# Patient Record
Sex: FEMALE | Race: WHITE | NOT HISPANIC OR LATINO | Employment: PART TIME | URBAN - METROPOLITAN AREA
[De-identification: names, ages, dates, MRNs, and addresses within clinical notes are randomized per-mention and may not be internally consistent; named-entity substitution may affect disease eponyms.]

---

## 2017-03-29 ENCOUNTER — OFFICE VISIT (OUTPATIENT)
Dept: INTERNAL MEDICINE | Age: 67
End: 2017-03-29

## 2017-03-29 VITALS
OXYGEN SATURATION: 96 % | HEART RATE: 78 BPM | SYSTOLIC BLOOD PRESSURE: 140 MMHG | WEIGHT: 140.2 LBS | TEMPERATURE: 97.7 F | HEIGHT: 62 IN | BODY MASS INDEX: 25.8 KG/M2 | DIASTOLIC BLOOD PRESSURE: 80 MMHG

## 2017-03-29 DIAGNOSIS — J30.9 ALLERGIC RHINITIS, UNSPECIFIED ALLERGIC RHINITIS TRIGGER, UNSPECIFIED RHINITIS SEASONALITY: Primary | ICD-10-CM

## 2017-03-29 PROCEDURE — 99213 OFFICE O/P EST LOW 20 MIN: CPT | Performed by: INTERNAL MEDICINE

## 2017-03-29 RX ORDER — FLUTICASONE PROPIONATE 50 MCG
2 SPRAY, SUSPENSION (ML) NASAL DAILY
Qty: 1 EACH | Refills: 0 | Status: SHIPPED | OUTPATIENT
Start: 2017-03-29 | End: 2017-07-24 | Stop reason: SDUPTHER

## 2017-03-29 NOTE — PROGRESS NOTES
Subjective   Sandi Acevedo is a 66 y.o. female.     History of Present Illness  head and chest congestion for the past several weeks.  She does note that similar illness seems to be passing around through coworkers.  He denied facial or dental pain.  Sinus drainage is clear.  Cough is productive of clear mucus as well, he denied hemoptysis.  She denied any chest pain, shortness breath, heartburn but does have some wheezing.  He is unsure if she has cough each spring.  Home treatment none.    The following portions of the patient's history were reviewed and updated as appropriate: allergies, current medications, past medical history, past social history and problem list.    Review of Systems   Constitutional: Positive for appetite change.   Gastrointestinal: Negative for nausea and vomiting.   Skin: Negative for rash.   Neurological: Negative for headaches.   Hematological: Negative for adenopathy.       Objective   Physical Exam   Constitutional: She is oriented to person, place, and time. She appears well-developed. No distress.   o/w   HENT:   Nose: Right sinus exhibits no maxillary sinus tenderness and no frontal sinus tenderness. Left sinus exhibits no maxillary sinus tenderness and no frontal sinus tenderness.   Neck: Neck supple.   Pulmonary/Chest: Effort normal and breath sounds normal. No respiratory distress. She has no wheezes.   No egophony noted   Lymphadenopathy:     She has no cervical adenopathy.   Neurological: She is alert and oriented to person, place, and time.   Skin: Skin is warm and dry.   Psychiatric: She has a normal mood and affect. Her behavior is normal. Thought content normal.   Nursing note and vitals reviewed.      Assessment/Plan   Sandi was seen today for uri.    Diagnoses and all orders for this visit:    Allergic rhinitis, unspecified allergic rhinitis trigger, unspecified rhinitis seasonality  -     fluticasone (FLONASE) 50 MCG/ACT nasal spray; 2 sprays into each nostril Daily for 30  days.      Number-one suspect that this is an allergic etiology, and will treat with Flonase as above patient was also asked to obtain Allegra once daily.  She'll let me know in 2 weeks if the symptoms have not improved her satisfaction. N o Antibiotics will be prescribed at this point.

## 2017-04-13 ENCOUNTER — TELEPHONE (OUTPATIENT)
Dept: INTERNAL MEDICINE | Age: 67
End: 2017-04-13

## 2017-04-13 DIAGNOSIS — J45.909 UNCOMPLICATED ASTHMA, UNSPECIFIED ASTHMA SEVERITY: Primary | ICD-10-CM

## 2017-04-18 ENCOUNTER — HOSPITAL ENCOUNTER (OUTPATIENT)
Dept: RESPIRATORY THERAPY | Facility: HOSPITAL | Age: 67
Discharge: HOME | End: 2017-04-18
Admitting: INTERNAL MEDICINE

## 2017-04-18 LAB
BDY SITE: NORMAL
HGB BLDA-MCNC: 14 G/DL

## 2017-04-18 PROCEDURE — 94729 DIFFUSING CAPACITY: CPT

## 2017-04-18 PROCEDURE — 82820 HEMOGLOBIN-OXYGEN AFFINITY: CPT | Performed by: INTERNAL MEDICINE

## 2017-04-18 PROCEDURE — 94060 EVALUATION OF WHEEZING: CPT

## 2017-04-18 PROCEDURE — 94726 PLETHYSMOGRAPHY LUNG VOLUMES: CPT

## 2017-04-18 PROCEDURE — A9270 NON-COVERED ITEM OR SERVICE: HCPCS | Performed by: INTERNAL MEDICINE

## 2017-04-18 PROCEDURE — 63710000001 ALBUTEROL PER 1 MG: Performed by: INTERNAL MEDICINE

## 2017-04-18 RX ORDER — ALBUTEROL SULFATE 2.5 MG/3ML
2.5 SOLUTION RESPIRATORY (INHALATION) ONCE AS NEEDED
Status: COMPLETED | OUTPATIENT
Start: 2017-04-18 | End: 2017-04-18

## 2017-04-18 RX ADMIN — ALBUTEROL SULFATE 2.5 MG: 2.5 SOLUTION RESPIRATORY (INHALATION) at 15:19

## 2017-04-28 ENCOUNTER — OFFICE VISIT (OUTPATIENT)
Dept: INTERNAL MEDICINE | Age: 67
End: 2017-04-28

## 2017-04-28 VITALS
DIASTOLIC BLOOD PRESSURE: 80 MMHG | SYSTOLIC BLOOD PRESSURE: 150 MMHG | WEIGHT: 138 LBS | TEMPERATURE: 97.6 F | OXYGEN SATURATION: 98 % | HEART RATE: 75 BPM | HEIGHT: 62 IN | BODY MASS INDEX: 25.4 KG/M2

## 2017-04-28 DIAGNOSIS — T46.4X5A COUGH DUE TO ACE INHIBITOR: ICD-10-CM

## 2017-04-28 DIAGNOSIS — R05.8 COUGH DUE TO ACE INHIBITOR: ICD-10-CM

## 2017-04-28 DIAGNOSIS — E78.00 PURE HYPERCHOLESTEROLEMIA: ICD-10-CM

## 2017-04-28 DIAGNOSIS — I10 HYPERTENSION, ESSENTIAL: Primary | ICD-10-CM

## 2017-04-28 DIAGNOSIS — J43.9 PULMONARY EMPHYSEMA, UNSPECIFIED EMPHYSEMA TYPE (HCC): ICD-10-CM

## 2017-04-28 LAB
ALBUMIN SERPL-MCNC: 4.4 G/DL (ref 3.5–5.2)
ALBUMIN/GLOB SERPL: 1.2 G/DL
ALP SERPL-CCNC: 82 U/L (ref 39–117)
ALT SERPL-CCNC: 23 U/L (ref 1–33)
AST SERPL-CCNC: 23 U/L (ref 1–32)
BILIRUB SERPL-MCNC: 0.6 MG/DL (ref 0.1–1.2)
BUN SERPL-MCNC: 15 MG/DL (ref 8–23)
BUN/CREAT SERPL: 17 (ref 7–25)
CALCIUM SERPL-MCNC: 9.8 MG/DL (ref 8.6–10.5)
CHLORIDE SERPL-SCNC: 101 MMOL/L (ref 98–107)
CHOLEST SERPL-MCNC: 231 MG/DL (ref 0–200)
CO2 SERPL-SCNC: 25.8 MMOL/L (ref 22–29)
CREAT SERPL-MCNC: 0.88 MG/DL (ref 0.57–1)
GLOBULIN SER CALC-MCNC: 3.6 GM/DL
GLUCOSE SERPL-MCNC: 103 MG/DL (ref 65–99)
HDLC SERPL-MCNC: 67 MG/DL (ref 40–60)
LDLC SERPL CALC-MCNC: 143 MG/DL (ref 0–100)
POTASSIUM SERPL-SCNC: 4.4 MMOL/L (ref 3.5–5.2)
PROT SERPL-MCNC: 8 G/DL (ref 6–8.5)
SODIUM SERPL-SCNC: 140 MMOL/L (ref 136–145)
TRIGL SERPL-MCNC: 104 MG/DL (ref 0–150)
TSH SERPL DL<=0.005 MIU/L-ACNC: 2.63 MIU/ML (ref 0.27–4.2)
VLDLC SERPL CALC-MCNC: 20.8 MG/DL (ref 5–40)

## 2017-04-28 PROCEDURE — 99214 OFFICE O/P EST MOD 30 MIN: CPT | Performed by: INTERNAL MEDICINE

## 2017-04-28 RX ORDER — AMLODIPINE BESYLATE 5 MG/1
7.5 TABLET ORAL DAILY
Qty: 45 TABLET | Refills: 2 | Status: SHIPPED | OUTPATIENT
Start: 2017-04-28 | End: 2017-10-25 | Stop reason: SDUPTHER

## 2017-04-28 NOTE — PROGRESS NOTES
"Sandi V Kristina / 66 y.o. / female  04/28/2017    VITALS    /80  Pulse 75  Temp 97.6 °F (36.4 °C)  Ht 62\" (157.5 cm)  Wt 138 lb (62.6 kg)  LMP  (Approximate)  SpO2 98%  BMI 25.24 kg/m2  BP Readings from Last 3 Encounters:   04/28/17 150/80   03/29/17 140/80   10/28/16 140/70     Wt Readings from Last 3 Encounters:   04/28/17 138 lb (62.6 kg)   03/29/17 140 lb 3.2 oz (63.6 kg)   10/28/16 140 lb 1.6 oz (63.5 kg)      Body mass index is 25.24 kg/(m^2).    CC:  Main reason(s) for today's visit: Hypertension      HPI:     Patient presents today for follow-up of hypertension.  She was last seen by me in October last year which time her blood pressure well controlled 140/70.    Hypertension: Today she is compliant with medication, after salt restriction.  Exercises regularly, occasionally will monitor blood pressure the outpatient setting.  When monitored, blood pressures noted to be within normal range.    Patient complains of dry cough over the past 4 months.  He denied fever, chills, sweats, wheezing, heartburn and she does have drainage.  The Flonase and Allegra have been helpful with  controlling drainage but she still does note some issues with a dry cough.  She has been on Lotrel for the past 18 years.    Interval history: PFTs were done in the hospital last week, these are consistent with obstructive defect.  She is a nonsmoker, her daughter did smoke at one time.    ______________________________________________________    ASSESSMENT & PLAN:    1. Hypertension, essential    2. Cough due to ACE inhibitor    3. Pulmonary emphysema, unspecified emphysema type    4. Pure hypercholesterolemia      Orders Placed This Encounter   Procedures   • Comprehensive Metabolic Panel   • Lipid Panel   • TSH       Summary/Discussion:     · #1 hypertension needs improved control.  Changed from Lotrel to amlodipine at 7.5 mg per day, blood pressure check 6 weeks.  ·   · #2 cough this may be due to ACE inhibitor, or a " manifestation of reversible airway disease.  Plan: I initially will eliminate the benazepril from her regimen and have her come back in 6 weeks to recheck blood pressure.  If cough is persistent at that time would consider addition of a beta-2 agonist.  ·   · #3 mild obstructive defect on pulmonary function tests done last month with significant reversible component.  This may indicate presence of mild degree of COPD or asthma.  ·   · #4 hyperlipidemia status post diet, we'll check lipids today.  (Patient is fasting.)      Return in about 6 weeks (around 6/9/2017) for Blood pressure check.    Future Appointments  Date Time Provider Department Center   7/5/2017 2:15 PM Gary Coughlin MD [facility] None       ______________________________________________________    REVIEW OF SYSTEMS    Review of Systems   Respiratory: Negative for chest tightness and shortness of breath.    Cardiovascular: Negative for chest pain and palpitations.   Neurological: Negative for dizziness, syncope, speech difficulty, weakness, light-headedness and headaches.         PHYSICAL EXAMINATION    Physical Exam   Constitutional: She is oriented to person, place, and time. She appears well-developed and well-nourished. No distress.   Cardiovascular: Normal rate, regular rhythm, normal heart sounds and intact distal pulses.  Exam reveals no gallop and no friction rub.    No murmur heard.  Pulmonary/Chest: Effort normal and breath sounds normal. She has no wheezes. She has no rales.   Musculoskeletal: She exhibits no edema.   Neurological: She is alert and oriented to person, place, and time.   Skin: Skin is warm and dry. No rash noted.   Psychiatric: She has a normal mood and affect. Her behavior is normal. Judgment and thought content normal.   Nursing note and vitals reviewed.    l      REVIEWED DATA:    Labs:   Lab Results   Component Value Date     11/02/2016    K 4.3 11/02/2016    AST 24 11/02/2016    ALT 16 11/02/2016    BUN  13 11/02/2016    CREATININE 0.79 11/02/2016    CREATININE 0.75 05/23/2016    EGFRIFNONA 77 11/02/2016    EGFRIFAFRI 89 11/02/2016       Lab Results   Component Value Date    GLU 92 11/02/2016    GLU 92 05/23/2016       Lab Results   Component Value Date     (H) 11/02/2016     (H) 05/23/2016    HDL 62 11/02/2016    TRIG 99 11/02/2016       Lab Results   Component Value Date    TSH 2.520 05/23/2016          Lab Results   Component Value Date    WBC 8.7 05/23/2016    HGB 14.5 05/23/2016     05/23/2016        Imaging:        Medical Tests:        Summary of old records / correspondence / consultant report:        Request outside records:          ALLERGIES:    Review of patient's allergies indicates no known allergies.    MEDICATIONS:       Current Outpatient Prescriptions   Medication Sig Dispense Refill   • amLODIPine (NORVASC) 5 MG tablet Take 1.5 tablets by mouth Daily. 45 tablet 2   • aspirin 81 MG tablet Take 81 mg by mouth daily.     • calcium carbonate (OS-ROCIO) 600 MG tablet Take 600 mg by mouth daily.     • cholecalciferol (VITAMIN D3) 1000 UNITS tablet Take 1,000 Units by mouth daily.     • clobetasol (TEMOVATE) 0.05 % cream Apply 1 application topically 1 (One) Time Per Week.     • Flaxseed, Linseed, (FLAXSEED OIL PO) Take  by mouth.     • fluticasone (FLONASE) 50 MCG/ACT nasal spray 2 sprays into each nostril Daily for 30 days. 1 each 0   • Multiple Vitamins-Minerals ([employer] SPECTRAVITE ADULT 50+ PO) Take  by mouth.     • Omega-3 Fatty Acids (FISH OIL) 1000 MG capsule capsule Take  by mouth daily with breakfast.     • Probiotic Product (PROBIOTIC DAILY PO) Take  by mouth.       No current facility-administered medications for this visit.        [facility]    The following portions of the patient's history were reviewed and updated as appropriate: Allergies / Current Medications / Past Medical History / Surgical History / Social History / Family History    PROBLEM LIST:    Patient Active Problem  List   Diagnosis   • Hypertension, essential   • Dupuytren's contracture of hand (Keo Bauer)   • Glaucoma associated with ocular disorder (Conrd)   • Low back pain   • Carotid stenosis mild ( 2/2012)   • Blepharitis of both eyes   • Lichen sclerosus (GYN = isabell Garza)   • Agoraphobia   • Urinary incontinence in female   • Squamous cell carcinoma   • Osteopenia   • FH: uterine cancer   • FH: CAD (coronary artery disease)   • Vitamin D deficiency disease   • Postmenopausal   • Panic anxiety syndrome (Xanax)   • Hayfever   • Osteoarthritis   • Routine health maintenance   • Cough due to ACE inhibitor   • Pulmonary emphysema   • Pure hypercholesterolemia       PAST MEDICAL HX:    Past Medical History:   Diagnosis Date   • Anxiety    • Arthritis    • Hypertension    • Low back pain    • Osteopenia    • Urinary tract infection    • Visual impairment        PAST SURGICAL HX:    Past Surgical History:   Procedure Laterality Date   • CYST REMOVAL Right 12/23/2015    Neck- Dorota Bauer   • EYE SURGERY Left 01/01/1955    to repair lazy eye   • LAPAROSCOPIC TUBAL LIGATION Bilateral 1986   • TUBAL ABDOMINAL LIGATION         SOCIAL HX:    Social History     Social History   • Marital status:      Spouse name: none   • Number of children: 2   • Years of education: 12      Occupational History   • 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[employer]     Social History Main Topics   • Smoking status: Never Smoker   • Smokeless tobacco: Never Used   • Alcohol use No   • Drug use: No   • Sexual activity: No     Other Topics Concern   • None     Social History Narrative    Hobby water aerobics, yard work       FAMILY HX:    Family History   Problem Relation Age of Onset   • Arthritis Mother    • Cancer Mother      espoh, uterine   • Glaucoma Mother    • Hypertension Mother    • Stroke Mother    • Osteoporosis Mother    • Ovarian cancer Mother    • Heart disease Father    • Stroke Father    • Thyroid disease Sister     • Hyperlipidemia Brother    • Hypertension Brother    • Asthma Daughter    • Heart disease Maternal Aunt          **Jose Disclaimer:   Much of this encounter note is an electronic transcription/translation of spoken language to printed text. The electronic translation of spoken language may permit erroneous, or at times, nonsensical words or phrases to be inadvertently transcribed. Although I have reviewed the note for such errors, some may still exist.

## 2017-05-10 ENCOUNTER — TELEPHONE (OUTPATIENT)
Dept: INTERNAL MEDICINE | Age: 67
End: 2017-05-10

## 2017-05-12 ENCOUNTER — TELEPHONE (OUTPATIENT)
Dept: INTERNAL MEDICINE | Age: 67
End: 2017-05-12

## 2017-05-12 ENCOUNTER — PATIENT MESSAGE (OUTPATIENT)
Dept: INTERNAL MEDICINE | Age: 67
End: 2017-05-12

## 2017-05-24 ENCOUNTER — OFFICE VISIT (OUTPATIENT)
Dept: CARDIOLOGY | Facility: CLINIC | Age: 67
End: 2017-05-24

## 2017-05-24 VITALS
SYSTOLIC BLOOD PRESSURE: 143 MMHG | HEIGHT: 62 IN | HEART RATE: 79 BPM | BODY MASS INDEX: 25.58 KG/M2 | DIASTOLIC BLOOD PRESSURE: 81 MMHG | WEIGHT: 139 LBS

## 2017-05-24 DIAGNOSIS — R00.2 PALPITATIONS: ICD-10-CM

## 2017-05-24 DIAGNOSIS — I65.29 STENOSIS OF CAROTID ARTERY, UNSPECIFIED LATERALITY: ICD-10-CM

## 2017-05-24 DIAGNOSIS — I10 HYPERTENSION, ESSENTIAL: Primary | ICD-10-CM

## 2017-05-24 DIAGNOSIS — E78.5 HYPERLIPIDEMIA, UNSPECIFIED HYPERLIPIDEMIA TYPE: ICD-10-CM

## 2017-05-24 PROCEDURE — 93000 ELECTROCARDIOGRAM COMPLETE: CPT | Performed by: INTERNAL MEDICINE

## 2017-05-24 PROCEDURE — 99213 OFFICE O/P EST LOW 20 MIN: CPT | Performed by: INTERNAL MEDICINE

## 2017-06-08 ENCOUNTER — OFFICE VISIT (OUTPATIENT)
Dept: INTERNAL MEDICINE | Age: 67
End: 2017-06-08

## 2017-06-08 VITALS
BODY MASS INDEX: 25.95 KG/M2 | OXYGEN SATURATION: 98 % | DIASTOLIC BLOOD PRESSURE: 70 MMHG | WEIGHT: 141 LBS | HEART RATE: 82 BPM | TEMPERATURE: 97.9 F | HEIGHT: 62 IN | SYSTOLIC BLOOD PRESSURE: 140 MMHG

## 2017-06-08 DIAGNOSIS — I10 HYPERTENSION, ESSENTIAL: Primary | ICD-10-CM

## 2017-06-08 DIAGNOSIS — R05.8 COUGH DUE TO ACE INHIBITOR: ICD-10-CM

## 2017-06-08 DIAGNOSIS — T46.4X5A COUGH DUE TO ACE INHIBITOR: ICD-10-CM

## 2017-06-08 PROCEDURE — 99213 OFFICE O/P EST LOW 20 MIN: CPT | Performed by: INTERNAL MEDICINE

## 2017-06-08 NOTE — PROGRESS NOTES
Subjective   Sandi Acevedo is a 66 y.o. female.     History of Present Illness patient presents today for follow-up of last office visit approximately 6 weeks ago.  That time, because of cough which I presume was secondary to ACE inhibitor, Lotrel was changed amlodipine alone.  Patient was asked to return today for follow-up of cough and hypertension control.  She notes that the cough is totally resolved at this time.    Hypertension: She is compliant with medication and Dr. salt restriction, regular exercise, and occasional outpatient blood pressure monitoring.    The following portions of the patient's history were reviewed and updated as appropriate: allergies, current medications, past medical history and problem list.    Review of Systems   Respiratory: Negative for chest tightness and shortness of breath.    Cardiovascular: Negative for chest pain and palpitations.   Neurological: Negative for dizziness, syncope, speech difficulty, weakness, light-headedness and headaches.       Objective   Physical Exam   Constitutional: She is oriented to person, place, and time. She appears well-developed. No distress.   o/w   Cardiovascular: Normal rate, regular rhythm, normal heart sounds and intact distal pulses.  Exam reveals no gallop and no friction rub.    No murmur heard.  Pulmonary/Chest: Effort normal and breath sounds normal. She has no wheezes. She has no rales.   Musculoskeletal: She exhibits no edema.   Neurological: She is alert and oriented to person, place, and time.   Skin: Skin is warm and dry. No rash noted.   Psychiatric: She has a normal mood and affect. Her behavior is normal. Judgment and thought content normal.   Nursing note and vitals reviewed.      Assessment/Plan   Sandi was seen today for hypertension.    Diagnoses and all orders for this visit:    Hypertension, essential    Cough due to ACE inhibitor    #1 hypertension stable on current medical regimen.  Plan: Same meds, blood pressure check at  physical in October.  Refill for amlodipine 2 separate tablets 5 mg and 2.5 mg provided today.    #2 cough resolved, shortness and annotated with reaction to benazepril.

## 2017-07-07 ENCOUNTER — TRANSCRIBE ORDERS (OUTPATIENT)
Dept: ADMINISTRATIVE | Facility: HOSPITAL | Age: 67
End: 2017-07-07

## 2017-07-07 DIAGNOSIS — Z12.31 VISIT FOR SCREENING MAMMOGRAM: Primary | ICD-10-CM

## 2017-07-07 DIAGNOSIS — Z78.0 POSTMENOPAUSAL STATUS: ICD-10-CM

## 2017-07-24 DIAGNOSIS — J30.9 ALLERGIC RHINITIS, UNSPECIFIED ALLERGIC RHINITIS TRIGGER, UNSPECIFIED RHINITIS SEASONALITY: ICD-10-CM

## 2017-07-24 RX ORDER — FLUTICASONE PROPIONATE 50 MCG
SPRAY, SUSPENSION (ML) NASAL
Qty: 16 ML | Refills: 0 | Status: SHIPPED | OUTPATIENT
Start: 2017-07-24 | End: 2017-10-18 | Stop reason: SDUPTHER

## 2017-08-07 ENCOUNTER — APPOINTMENT (OUTPATIENT)
Dept: BONE DENSITY | Facility: HOSPITAL | Age: 67
End: 2017-08-07
Attending: SPECIALIST

## 2017-08-07 ENCOUNTER — APPOINTMENT (OUTPATIENT)
Dept: MAMMOGRAPHY | Facility: HOSPITAL | Age: 67
End: 2017-08-07
Attending: SPECIALIST

## 2017-08-08 ENCOUNTER — HOSPITAL ENCOUNTER (OUTPATIENT)
Dept: BONE DENSITY | Facility: HOSPITAL | Age: 67
Discharge: HOME | End: 2017-08-08
Attending: SPECIALIST | Admitting: SPECIALIST

## 2017-08-08 ENCOUNTER — HOSPITAL ENCOUNTER (OUTPATIENT)
Dept: MAMMOGRAPHY | Facility: HOSPITAL | Age: 67
Discharge: HOME | End: 2017-08-08
Attending: SPECIALIST

## 2017-08-08 DIAGNOSIS — Z12.31 VISIT FOR SCREENING MAMMOGRAM: ICD-10-CM

## 2017-08-08 DIAGNOSIS — Z78.0 POSTMENOPAUSAL STATUS: ICD-10-CM

## 2017-08-08 PROCEDURE — 77080 DXA BONE DENSITY AXIAL: CPT

## 2017-08-08 PROCEDURE — G0202 SCR MAMMO BI INCL CAD: HCPCS

## 2017-09-03 ENCOUNTER — PATIENT MESSAGE (OUTPATIENT)
Dept: INTERNAL MEDICINE | Age: 67
End: 2017-09-03

## 2017-09-05 RX ORDER — TRIAMTERENE AND HYDROCHLOROTHIAZIDE 37.5; 25 MG/1; MG/1
1 TABLET ORAL DAILY
Qty: 30 TABLET | Refills: 1 | Status: SHIPPED | OUTPATIENT
Start: 2017-09-05 | End: 2017-10-02

## 2017-09-08 ENCOUNTER — TELEPHONE (OUTPATIENT)
Dept: INTERNAL MEDICINE | Age: 67
End: 2017-09-08

## 2017-09-08 NOTE — TELEPHONE ENCOUNTER
----- Message from Citlaly HoyosVIOLETA sent at 9/8/2017  9:05 AM EDT -----  Regarding: FW: Non-Urgent Medical Question  Contact: [phone]      ----- Message -----     From: Sandi Acevedo     Sent: 9/8/2017   9:04 AM       To: Aleksandar Cadena [facility]  Subject: RE: Non-Urgent Medical Question                  Hi  Will this pill interact with my vitamins?  Thanks  ----- Message -----  From: VIOLETA Citlaly EWING  Sent: 9/5/2017 12:27 PM EDT  To: Sandi Acevedo  Subject: RE: Non-Urgent Medical Question    Yes. It was sent to [facility] on [address].   Citlaly ManuelgomeryVIOLETA    ----- Message -----     From: Sandi Acevedo     Sent: 9/5/2017 12:16 PM EDT       To: Uri Joaquin MD  Subject: RE: Non-Urgent Medical Question    Ok did you send it to [facility] on [address] [facility] [facility]?  ----- Message -----  From: Uri Joaquin MD  Sent: 9/5/2017 10:36 AM EDT  To: Sandi Acevedo  Subject: RE: Non-Urgent Medical Question    I would prefer to use the Maxide since it does not lower the potassium.  Hydrochlorothiazide alone will cause low potassium and can cause heart  rhythm disturbances.  Dr. Joaquin    ----- Message -----     From: Sandi Acevedo     Sent: 9/5/2017  9:34 AM EDT       To: Uri Joaquin MD  Subject: RE: Non-Urgent Medical Question    Hello  I read about maxide. Is it possible for me to take a low dose of just hydrochlorothiazide?  Thanks  ----- Message -----  From: Uri Joaquin MD  Sent: 9/5/2017  7:34 AM EDT  To: Sandi Acevedo  Subject: RE: Non-Urgent Medical Question    I believe the increased edema is due to the amlodipine.  We can try low-dose diuretic (Maxide 25).  Please let me know if this is not helpful over the next week or so.  Dr. Joaquin    ----- Message -----     From: Sandi Acevedo     Sent: 9/3/2017  9:33 AM EDT       To: Uri Joaquin MD  Subject: Non-Urgent Medical Question    Hello  I have gained around 5 pounds since I no longer kaylee benazapril. I believe I am retaining water. Is it possible for  me to take a low dose of water pill?  Many thanks and have a good day!

## 2017-10-18 DIAGNOSIS — J30.9 ALLERGIC RHINITIS: ICD-10-CM

## 2017-10-18 RX ORDER — FLUTICASONE PROPIONATE 50 MCG
SPRAY, SUSPENSION (ML) NASAL
Qty: 16 ML | Refills: 0 | Status: SHIPPED | OUTPATIENT
Start: 2017-10-18 | End: 2017-11-01 | Stop reason: SDUPTHER

## 2017-10-25 ENCOUNTER — OFFICE VISIT (OUTPATIENT)
Dept: INTERNAL MEDICINE | Age: 67
End: 2017-10-25

## 2017-10-25 VITALS
HEART RATE: 72 BPM | OXYGEN SATURATION: 98 % | WEIGHT: 140.8 LBS | BODY MASS INDEX: 25.91 KG/M2 | DIASTOLIC BLOOD PRESSURE: 70 MMHG | HEIGHT: 62 IN | TEMPERATURE: 97.8 F | SYSTOLIC BLOOD PRESSURE: 136 MMHG

## 2017-10-25 DIAGNOSIS — J43.9 PULMONARY EMPHYSEMA, UNSPECIFIED EMPHYSEMA TYPE (HCC): ICD-10-CM

## 2017-10-25 DIAGNOSIS — I10 HYPERTENSION, ESSENTIAL: Primary | ICD-10-CM

## 2017-10-25 DIAGNOSIS — E78.00 PURE HYPERCHOLESTEROLEMIA: ICD-10-CM

## 2017-10-25 DIAGNOSIS — Z23 ENCOUNTER FOR IMMUNIZATION: ICD-10-CM

## 2017-10-25 PROBLEM — J44.9 COPD (CHRONIC OBSTRUCTIVE PULMONARY DISEASE): Status: ACTIVE | Noted: 2017-04-28

## 2017-10-25 LAB
ALBUMIN SERPL-MCNC: 4.6 G/DL (ref 3.5–5.2)
ALBUMIN/GLOB SERPL: 1.6 G/DL
ALP SERPL-CCNC: 104 U/L (ref 39–117)
ALT SERPL-CCNC: 24 U/L (ref 1–33)
AST SERPL-CCNC: 26 U/L (ref 1–32)
BASOPHILS # BLD AUTO: 0.05 10*3/MM3 (ref 0–0.2)
BASOPHILS NFR BLD AUTO: 0.8 % (ref 0–1.5)
BILIRUB SERPL-MCNC: 0.6 MG/DL (ref 0.1–1.2)
BUN SERPL-MCNC: 12 MG/DL (ref 8–23)
BUN/CREAT SERPL: 13.2 (ref 7–25)
CALCIUM SERPL-MCNC: 10 MG/DL (ref 8.6–10.5)
CHLORIDE SERPL-SCNC: 101 MMOL/L (ref 98–107)
CHOLEST SERPL-MCNC: 232 MG/DL (ref 0–200)
CO2 SERPL-SCNC: 30.1 MMOL/L (ref 22–29)
CREAT SERPL-MCNC: 0.91 MG/DL (ref 0.57–1)
EOSINOPHIL # BLD AUTO: 0.14 10*3/MM3 (ref 0–0.7)
EOSINOPHIL NFR BLD AUTO: 2.3 % (ref 0.3–6.2)
ERYTHROCYTE [DISTWIDTH] IN BLOOD BY AUTOMATED COUNT: 13.5 % (ref 11.7–13)
GFR SERPLBLD CREATININE-BSD FMLA CKD-EPI: 61 ML/MIN/1.73
GFR SERPLBLD CREATININE-BSD FMLA CKD-EPI: 74 ML/MIN/1.73
GLOBULIN SER CALC-MCNC: 2.8 GM/DL
GLUCOSE SERPL-MCNC: 100 MG/DL (ref 65–99)
HCT VFR BLD AUTO: 46 % (ref 35.6–45.5)
HDLC SERPL-MCNC: 59 MG/DL (ref 40–60)
HGB BLD-MCNC: 14.4 G/DL (ref 11.9–15.5)
IMM GRANULOCYTES # BLD: 0 10*3/MM3 (ref 0–0.03)
IMM GRANULOCYTES NFR BLD: 0 % (ref 0–0.5)
LDLC SERPL CALC-MCNC: 147 MG/DL (ref 0–100)
LYMPHOCYTES # BLD AUTO: 1.58 10*3/MM3 (ref 0.9–4.8)
LYMPHOCYTES NFR BLD AUTO: 25.7 % (ref 19.6–45.3)
MCH RBC QN AUTO: 29.9 PG (ref 26.9–32)
MCHC RBC AUTO-ENTMCNC: 31.3 G/DL (ref 32.4–36.3)
MCV RBC AUTO: 95.6 FL (ref 80.5–98.2)
MONOCYTES # BLD AUTO: 0.6 10*3/MM3 (ref 0.2–1.2)
MONOCYTES NFR BLD AUTO: 9.8 % (ref 5–12)
NEUTROPHILS # BLD AUTO: 3.77 10*3/MM3 (ref 1.9–8.1)
NEUTROPHILS NFR BLD AUTO: 61.4 % (ref 42.7–76)
PLATELET # BLD AUTO: 329 10*3/MM3 (ref 140–500)
POTASSIUM SERPL-SCNC: 4.4 MMOL/L (ref 3.5–5.2)
PROT SERPL-MCNC: 7.4 G/DL (ref 6–8.5)
RBC # BLD AUTO: 4.81 10*6/MM3 (ref 3.9–5.2)
SODIUM SERPL-SCNC: 142 MMOL/L (ref 136–145)
T4 FREE SERPL-MCNC: 1.13 NG/DL (ref 0.93–1.7)
TRIGL SERPL-MCNC: 129 MG/DL (ref 0–150)
TSH SERPL DL<=0.005 MIU/L-ACNC: 5.41 MIU/ML (ref 0.27–4.2)
VLDLC SERPL CALC-MCNC: 25.8 MG/DL (ref 5–40)
WBC # BLD AUTO: 6.14 10*3/MM3 (ref 4.5–10.7)

## 2017-10-25 PROCEDURE — 90670 PCV13 VACCINE IM: CPT | Performed by: INTERNAL MEDICINE

## 2017-10-25 PROCEDURE — 99214 OFFICE O/P EST MOD 30 MIN: CPT | Performed by: INTERNAL MEDICINE

## 2017-10-25 PROCEDURE — G0009 ADMIN PNEUMOCOCCAL VACCINE: HCPCS | Performed by: INTERNAL MEDICINE

## 2017-10-25 RX ORDER — AMLODIPINE BESYLATE 2.5 MG/1
5 TABLET ORAL DAILY
Qty: 90 TABLET | Refills: 3 | Status: SHIPPED | OUTPATIENT
Start: 2017-10-25 | End: 2018-03-05 | Stop reason: SDUPTHER

## 2017-10-25 RX ORDER — AMLODIPINE BESYLATE 5 MG/1
5 TABLET ORAL DAILY
Qty: 90 TABLET | Refills: 3 | Status: SHIPPED | OUTPATIENT
Start: 2017-10-25 | End: 2018-04-25 | Stop reason: SDUPTHER

## 2017-10-25 NOTE — PROGRESS NOTES
"Sandi DELUCA Kristina / 67 y.o. / female  10/25/2017  VITALS    /70  Pulse 72  Temp 97.8 °F (36.6 °C)  Ht 62\" (157.5 cm)  Wt 140 lb 12.8 oz (63.9 kg)  SpO2 98%  BMI 25.75 kg/m2    BP Readings from Last 3 Encounters:   10/25/17 136/70   06/08/17 140/70   05/24/17 143/81     Wt Readings from Last 3 Encounters:   10/25/17 140 lb 12.8 oz (63.9 kg)   06/08/17 141 lb (64 kg)   05/24/17 139 lb (63 kg)      Body mass index is 25.75 kg/(m^2).    CC:  Main reason(s) for today's visit: Hypertension      HPI:     Patient presents today to follow-up several medical issues.  His last seen by me approximate 6 months ago.    Hypertension: She is compliant with medication, dietary salt restriction, exercise has been somewhat limited because of underlying COPD.  At home blood pressure runs in the 130s over 70s.  There are no medication side effects.    Hyperlipidemia by history, patient is on no medication at this time, she is fasting for lab work today.    Immunization update: Patient is not interested in a flu shot today, however she would like to have her pneumonia series begun this year.  Erroneously the chart reflects that she's had pneumonia shot in the past that has not happened.  We will give her Prevnar this year and Pneumovax next year.    COPD noted in 2016.  Patient is on no medication at this time.  She never smoked.  PFT shows mild airway obstruction with significant improvement following use of bronchodilator.  Impression mild airway obstruction.  Patient has had a significant exposure to secondhand smoke.  At this time she denies any symptoms (cough, wheezing, shortness of air).  Since she has no symptoms, I do not believe treatment is indicated at this time.  She is aware of the importance of yearly immunization with flu, and completing her pneumonia series.  She should also avoid before actively ill with lung infections.    Patient Care Team:  Uri Joaquin MD as PCP - General (Internal Medicine)  Uri MADRIGAL" MD Emani as PCP - Claims Attributed  ____________________________________________________________________    ASSESSMENT & PLAN:    Problem List Items Addressed This Visit        Unprioritized    Hypertension, essential - Primary    Relevant Medications    amLODIPine (NORVASC) 5 MG tablet    amLODIPine (NORVASC) 2.5 MG tablet    Other Relevant Orders    Comprehensive Metabolic Panel    Lipid Panel    TSH    T4, free    COPD (chronic obstructive pulmonary disease)    Relevant Medications    fluticasone (FLONASE) 50 MCG/ACT nasal spray    Other Relevant Orders    CBC & Differential    Pure hypercholesterolemia    Relevant Orders    Lipid Panel      Other Visit Diagnoses     Encounter for immunization        Relevant Orders    Pneumococcal Conjugate Vaccine 13-Valent All        Orders Placed This Encounter   Procedures   • Pneumococcal Conjugate Vaccine 13-Valent All   • Comprehensive Metabolic Panel   • Lipid Panel   • TSH   • T4, free       Summary/Discussion:  · Number-one hypertension stable on current medical regimen.  Plan: Same meds, blood pressure check 6 months, check CMP, lipid, thyroid function follow-up results online.  ·   · #2 hyperlipidemia by history, on no medications this time status to be determined.  Plan: Continue diet, check lipids today follow-up results as above.  ·   · #3 immunization update Prevnar today, Pneumovax next year.  ·   · #4 COPD by history and by pulmonary function testing.  At this time patient is asymptomatic, recommend no treatment.  I would advise annual immunization with flu, although patient has decided not to have this at this time knowing that this can cause acute infection and hospitalizations with COPD.  She will think about reconsidering this decision    Return in about 6 months (around 4/25/2018) for Recheck, Blood pressure check.    No future appointments.    ______________________________________________________    REVIEW OF SYSTEMS    Review of Systems   Respiratory:  Negative for chest tightness and shortness of breath.    Cardiovascular: Negative for chest pain and palpitations.   Neurological: Negative for dizziness, syncope, speech difficulty, weakness, light-headedness and headaches.           PHYSICAL EXAMINATION    Physical Exam   Constitutional: She is oriented to person, place, and time. She appears well-developed and well-nourished. No distress.   Cardiovascular: Normal rate, regular rhythm, normal heart sounds and intact distal pulses.  Exam reveals no gallop and no friction rub.    No murmur heard.  Pulmonary/Chest: Effort normal and breath sounds normal. She has no wheezes. She has no rales.   Musculoskeletal: She exhibits no edema.   Neurological: She is alert and oriented to person, place, and time.   Skin: Skin is warm and dry. No rash noted.   Psychiatric: She has a normal mood and affect. Her behavior is normal. Judgment and thought content normal.   Nursing note and vitals reviewed.      REVIEWED DATA:    Labs:     Lab Results   Component Value Date     04/28/2017    K 4.4 04/28/2017    AST 23 04/28/2017    ALT 23 04/28/2017    BUN 15 04/28/2017    CREATININE 0.88 04/28/2017    CREATININE 0.79 11/02/2016    CREATININE 0.75 05/23/2016    EGFRIFNONA 64 04/28/2017    EGFRIFAFRI 77 04/28/2017       Lab Results   Component Value Date     (H) 04/28/2017    GLU 92 11/02/2016    GLU 92 05/23/2016       Lab Results   Component Value Date     (H) 04/28/2017     (H) 11/02/2016     (H) 05/23/2016    HDL 67 (H) 04/28/2017    TRIG 104 04/28/2017       Lab Results   Component Value Date    TSH 2.630 04/28/2017       Lab Results   Component Value Date    WBC 8.7 05/23/2016    HGB 14.5 05/23/2016     05/23/2016       Imaging:         Medical Tests:         Summary of old records / correspondence / consultant report:         Request outside records:         ALLERGIES  Allergies   Allergen Reactions   • Benazepril Cough         MEDICATIONS  Current Outpatient Prescriptions   Medication Sig Dispense Refill   • amLODIPine (NORVASC) 2.5 MG tablet Take 2 tablets by mouth Daily. 90 tablet 3   • amLODIPine (NORVASC) 5 MG tablet Take 1 tablet by mouth Daily. 90 tablet 3   • aspirin 81 MG tablet Take 81 mg by mouth daily.     • calcium carbonate (OS-ROCIO) 600 MG tablet Take 600 mg by mouth daily.     • cholecalciferol (VITAMIN D3) 1000 UNITS tablet Take 1,000 Units by mouth daily.     • clobetasol (TEMOVATE) 0.05 % cream Apply 1 application topically 1 (One) Time Per Week.     • Flaxseed, Linseed, (FLAXSEED OIL PO) Take  by mouth.     • fluticasone (FLONASE) 50 MCG/ACT nasal spray INSTILL 2 SPRAYS INTO EACH NOSTRIL DAILY FOR 30 DAYS. 16 mL 0   • Multiple Vitamins-Minerals ([employer] SPECTRAVITE ADULT 50+ PO) Take  by mouth.     • Probiotic Product (PROBIOTIC DAILY PO) Take  by mouth.       No current facility-administered medications for this visit.        PFSH:     The following portions of the patient's history were reviewed and updated as appropriate: Allergies / Current Medications / Past Medical History / Surgical History / Social History / Family History    PROBLEM LIST   Patient Active Problem List   Diagnosis   • Hypertension, essential   • Dupuytren's contracture of hand (Keo Bauer)   • Glaucoma associated with ocular disorder (Conrd)   • Low back pain   • Carotid stenosis mild ( 2/2012)   • Blepharitis of both eyes   • Lichen sclerosus (GYN = isabell Garza)   • Agoraphobia   • Urinary incontinence in female   • Squamous cell carcinoma   • Osteopenia   • FH: uterine cancer   • FH: CAD (coronary artery disease)   • Vitamin D deficiency disease   • Postmenopausal   • Panic anxiety syndrome (Xanax)   • Hayfever   • Osteoarthritis   • Routine health maintenance   • Cough due to ACE inhibitor   • COPD (chronic obstructive pulmonary disease)   • Pure hypercholesterolemia   • Palpitations   • Hyperlipidemia       PAST MEDICAL HISTORY  Past Medical  History:   Diagnosis Date   • Allergic    • Anxiety    • Arthritis    • Cataract    • COPD (chronic obstructive pulmonary disease)     Mild obstructive defect noted 2017   • Emphysema of lung    • Hypertension    • Low back pain    • Osteopenia    • Urinary tract infection    • Visual impairment        SURGICAL HISTORY  Past Surgical History:   Procedure Laterality Date   • CYST REMOVAL Right 2015    Neck- Raya Bauer   • EYE SURGERY Left 1955    to repair lazy eye   • LAPAROSCOPIC TUBAL LIGATION Bilateral    • TUBAL ABDOMINAL LIGATION         SOCIAL HISTORY  Social History     Social History   • Marital status:      Spouse name: none   • Number of children: 2   • Years of education: 12      Occupational History   • 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[employer]     Social History Main Topics   • Smoking status: Never Smoker   • Smokeless tobacco: Never Used   • Alcohol use No   • Drug use: No   • Sexual activity: No     Other Topics Concern   • None     Social History Narrative    Hobby water aerobics, yard work       FAMILY HISTORY  Family History   Problem Relation Age of Onset   • Arthritis Mother         • Cancer Mother      Esophagus   • Glaucoma Mother    • Hypertension Mother         • Stroke Mother         • Osteoporosis Mother    • Ovarian cancer Mother    • Heart disease Mother      Congestive heart   • Heart disease Father    • Stroke Father         • Early death Father      Ceraboral hemmorage   • Hypertension Father         • Thyroid disease Sister    • Hyperlipidemia Brother    • Hypertension Brother    • Asthma Daughter    • Heart disease Maternal Aunt    • Asthma Daughter      She is on meds   • Heart disease Maternal Aunt      Congestive heart   • Heart disease Paternal Aunt      Stroke   • Hyperlipidemia Brother      On meds   • Hypertension Brother    • Stroke Maternal Aunt      On meds   • Thyroid disease Sister       On meds         **Jose Disclaimer:   Much of this encounter note is an electronic transcription/translation of spoken language to printed text. The electronic translation of spoken language may permit erroneous, or at times, nonsensical words or phrases to be inadvertently transcribed. Although I have reviewed the note for such errors, some may still exist.

## 2017-10-26 PROBLEM — E03.8 SUBCLINICAL HYPOTHYROIDISM: Status: ACTIVE | Noted: 2017-10-26

## 2017-11-01 DIAGNOSIS — J30.9 ALLERGIC RHINITIS, UNSPECIFIED CHRONICITY, UNSPECIFIED SEASONALITY, UNSPECIFIED TRIGGER: ICD-10-CM

## 2017-11-01 RX ORDER — FLUTICASONE PROPIONATE 50 MCG
2 SPRAY, SUSPENSION (ML) NASAL DAILY
Qty: 48 ML | Refills: 0 | Status: SHIPPED | OUTPATIENT
Start: 2017-11-01 | End: 2018-04-16 | Stop reason: SDUPTHER

## 2018-03-05 DIAGNOSIS — I10 HYPERTENSION, ESSENTIAL: Primary | ICD-10-CM

## 2018-03-05 RX ORDER — AMLODIPINE BESYLATE 2.5 MG/1
2.5 TABLET ORAL DAILY
Qty: 90 TABLET | Refills: 3 | Status: SHIPPED | OUTPATIENT
Start: 2018-03-05 | End: 2018-04-25

## 2018-04-16 DIAGNOSIS — J30.9 ALLERGIC RHINITIS, UNSPECIFIED CHRONICITY, UNSPECIFIED SEASONALITY, UNSPECIFIED TRIGGER: ICD-10-CM

## 2018-04-17 RX ORDER — FLUTICASONE PROPIONATE 50 MCG
SPRAY, SUSPENSION (ML) NASAL
Qty: 48 ML | Refills: 0 | Status: SHIPPED | OUTPATIENT
Start: 2018-04-17 | End: 2018-10-15 | Stop reason: SDUPTHER

## 2018-04-25 ENCOUNTER — OFFICE VISIT (OUTPATIENT)
Dept: INTERNAL MEDICINE | Age: 68
End: 2018-04-25

## 2018-04-25 VITALS
SYSTOLIC BLOOD PRESSURE: 150 MMHG | HEIGHT: 62 IN | HEART RATE: 64 BPM | BODY MASS INDEX: 26.91 KG/M2 | OXYGEN SATURATION: 98 % | WEIGHT: 146.2 LBS | DIASTOLIC BLOOD PRESSURE: 74 MMHG | TEMPERATURE: 97.6 F

## 2018-04-25 DIAGNOSIS — Z12.11 COLON CANCER SCREENING: ICD-10-CM

## 2018-04-25 DIAGNOSIS — Z00.00 ROUTINE HEALTH MAINTENANCE: ICD-10-CM

## 2018-04-25 DIAGNOSIS — Z23 IMMUNIZATION DUE: ICD-10-CM

## 2018-04-25 DIAGNOSIS — N30.00 ACUTE CYSTITIS WITHOUT HEMATURIA: ICD-10-CM

## 2018-04-25 DIAGNOSIS — E78.5 HYPERLIPIDEMIA, UNSPECIFIED HYPERLIPIDEMIA TYPE: ICD-10-CM

## 2018-04-25 DIAGNOSIS — E03.8 SUBCLINICAL HYPOTHYROIDISM: ICD-10-CM

## 2018-04-25 DIAGNOSIS — I10 HYPERTENSION, ESSENTIAL: Primary | ICD-10-CM

## 2018-04-25 LAB
ALBUMIN SERPL-MCNC: 4.3 G/DL (ref 3.5–5.2)
ALBUMIN/GLOB SERPL: 1.7 G/DL
ALP SERPL-CCNC: 87 U/L (ref 39–117)
ALT SERPL-CCNC: 28 U/L (ref 1–33)
APPEARANCE UR: CLEAR
AST SERPL-CCNC: 28 U/L (ref 1–32)
BILIRUB SERPL-MCNC: 0.5 MG/DL (ref 0.1–1.2)
BILIRUB UR QL STRIP: NEGATIVE
BUN SERPL-MCNC: 12 MG/DL (ref 8–23)
BUN/CREAT SERPL: 14.6 (ref 7–25)
CALCIUM SERPL-MCNC: 9.5 MG/DL (ref 8.6–10.5)
CHLORIDE SERPL-SCNC: 103 MMOL/L (ref 98–107)
CHOLEST SERPL-MCNC: 205 MG/DL (ref 0–200)
CO2 SERPL-SCNC: 26.7 MMOL/L (ref 22–29)
COLOR UR: YELLOW
CREAT SERPL-MCNC: 0.82 MG/DL (ref 0.57–1)
GFR SERPLBLD CREATININE-BSD FMLA CKD-EPI: 69 ML/MIN/1.73
GFR SERPLBLD CREATININE-BSD FMLA CKD-EPI: 84 ML/MIN/1.73
GLOBULIN SER CALC-MCNC: 2.5 GM/DL
GLUCOSE SERPL-MCNC: 99 MG/DL (ref 65–99)
GLUCOSE UR QL: NEGATIVE
HDLC SERPL-MCNC: 56 MG/DL (ref 40–60)
HGB UR QL STRIP: NEGATIVE
KETONES UR QL STRIP: NEGATIVE
LDLC SERPL CALC-MCNC: 124 MG/DL (ref 0–100)
LEUKOCYTE ESTERASE UR QL STRIP: NEGATIVE
NITRITE UR QL STRIP: NEGATIVE
PH UR STRIP: 7 [PH] (ref 5–8)
POTASSIUM SERPL-SCNC: 4.2 MMOL/L (ref 3.5–5.2)
PROT SERPL-MCNC: 6.8 G/DL (ref 6–8.5)
PROT UR QL STRIP: NEGATIVE
SODIUM SERPL-SCNC: 140 MMOL/L (ref 136–145)
SP GR UR: 1.02 (ref 1–1.03)
T4 FREE SERPL-MCNC: 1.22 NG/DL (ref 0.93–1.7)
TRIGL SERPL-MCNC: 127 MG/DL (ref 0–150)
TSH SERPL DL<=0.005 MIU/L-ACNC: 3.22 MIU/ML (ref 0.27–4.2)
UROBILINOGEN UR STRIP-MCNC: NORMAL MG/DL
VLDLC SERPL CALC-MCNC: 25.4 MG/DL (ref 5–40)

## 2018-04-25 PROCEDURE — G0009 ADMIN PNEUMOCOCCAL VACCINE: HCPCS | Performed by: INTERNAL MEDICINE

## 2018-04-25 PROCEDURE — 99214 OFFICE O/P EST MOD 30 MIN: CPT | Performed by: INTERNAL MEDICINE

## 2018-04-25 PROCEDURE — 90732 PPSV23 VACC 2 YRS+ SUBQ/IM: CPT | Performed by: INTERNAL MEDICINE

## 2018-04-25 RX ORDER — AMLODIPINE BESYLATE 10 MG/1
10 TABLET ORAL DAILY
Qty: 30 TABLET | Refills: 3
Start: 2018-04-25 | End: 2018-06-06 | Stop reason: DRUGHIGH

## 2018-04-25 NOTE — PROGRESS NOTES
Sandi V Kristina / 67 y.o. / female  04/25/2018      ASSESSMENT & PLAN:    Problem List Items Addressed This Visit        Unprioritized    Hypertension, essential - Primary    Relevant Medications    amLODIPine (NORVASC) 10 MG tablet    Other Relevant Orders    Comprehensive Metabolic Panel    Routine health maintenance    Hyperlipidemia    Relevant Orders    Lipid Panel    Subclinical hypothyroidism    Overview     October 2017         Relevant Orders    TSH    T4, free      Other Visit Diagnoses     Colon cancer screening        Relevant Orders    Cologuard - Stool, Per Rectum    Acute cystitis without hematuria        Relevant Orders    Urinalysis With / Microscopic If Indicated - Urine, Clean Catch        Orders Placed This Encounter   Procedures   • Lipid Panel   • TSH   • T4, free   • Urinalysis With / Microscopic If Indicated - Urine, Clean Catch   • Cologuard - Stool, Per Rectum   • Comprehensive Metabolic Panel       Summary/Discussion:    Number-one hypertension stage II systolic elevation, because of ongoing family stress with ongoing malignancy, I suspect that is the culprit behind the elevated blood pressure.  Recommend we increase Norvasc to 10 mg a day check blood pressure 6 weeks, check CMP follow-up results online.    #2 hyperlipidemia by history, recheck lipids today, patient has gained some weight.    #3 subclinical hypothyroidism, clinically euthyroid.  Plan: Check TFTs today and treat only if indicated by clinical results.    #4 colon cancer screening, patient prefers to use: [facility] colonoscopy, that'll be reordered today since the last done in 2016.    #5 history of acute cystitis, patient requests urine to be sure that infection has totally resolved.    #6 annual wellness visit with Leeanna.      Return in about 6 weeks (around 6/6/2018) for Blood pressure check.    ____________________________________________________________________    VITALS    Visit Vitals  /74   Pulse 64  "  Temp 97.6 °F (36.4 °C)   Ht 157 cm (61.81\")   Wt 66.3 kg (146 lb 3.2 oz)   SpO2 98%   BMI 26.90 kg/m²       BP Readings from Last 3 Encounters:   04/25/18 150/74   10/25/17 136/70   06/08/17 140/70     Wt Readings from Last 3 Encounters:   04/25/18 66.3 kg (146 lb 3.2 oz)   10/25/17 63.9 kg (140 lb 12.8 oz)   06/08/17 64 kg (141 lb)      Body mass index is 26.9 kg/m².    CC:  Main reason(s) for today's visit: Hypertension; Hypothyroidism; and Urinary Tract Infection      HPI:     Patient presents today for several medical issues.    Hypertension: She is compliant with medication, Actos salt restriction, regular exercises Silver sneakers and water aerobics.  Occasionally will monitor blood pressure the outpatient setting in no medication side effects noted.    Hyperlipidemia: Patient is not on medication at this time, there is due lipids so we'll check those this morning.  Subclinical hypothyroidism, labs last checked in October of last year, TSH was 5 with a normal T4 no treatment indicated at this time based upon current literature.    Colon cancer screening, patient is due for a colonoscopy but prefers to screen with: [facility].  This was last done in 2016 (December) we will repeat this for her today.    She also had urinary tract infection, and would like to have a follow-up urine to be sure that the infection has resolved.  He denied dysuria, frequency, hematuria and odor.        Patient Care Team:  Uri Joaquin MD as PCP - General (Internal Medicine)  Uri Joaquin MD as PCP - Claims Attributed  ____________________________________________________________________    REVIEW OF SYSTEMS    Review of Systems   Respiratory: Negative for chest tightness and shortness of breath.    Cardiovascular: Negative for chest pain and palpitations.   Gastrointestinal:        No change in bowel habits noted   Skin:        Patient denied skin or hair texture changes   Neurological: Negative for dizziness, tremors, syncope, " speech difficulty, weakness, light-headedness and headaches.         PHYSICAL EXAMINATION    Physical Exam   Constitutional: She is oriented to person, place, and time. She appears well-developed and well-nourished. No distress.   Eyes: EOM are normal.   Neck: No thyromegaly present.   Cardiovascular: Normal rate, regular rhythm, normal heart sounds and intact distal pulses.  Exam reveals no gallop and no friction rub.    No murmur heard.  Pulmonary/Chest: Effort normal and breath sounds normal. She has no wheezes. She has no rales.   Musculoskeletal: She exhibits no edema.   Neurological: She is alert and oriented to person, place, and time. She has normal reflexes.   Skin: Skin is warm and dry. No rash noted.   Psychiatric: She has a normal mood and affect. Her behavior is normal. Judgment and thought content normal.   Nursing note and vitals reviewed.      REVIEWED DATA:    Labs:     Lab Results   Component Value Date     10/25/2017    K 4.4 10/25/2017    AST 26 10/25/2017    ALT 24 10/25/2017    BUN 12 10/25/2017    CREATININE 0.91 10/25/2017    CREATININE 0.88 04/28/2017    CREATININE 0.79 11/02/2016    EGFRIFNONA 61 10/25/2017    EGFRIFAFRI 74 10/25/2017       No results found for: HGBA1C, GLUCOSE, MICROALBUR    Lab Results   Component Value Date     (H) 10/25/2017     (H) 04/28/2017     (H) 11/02/2016    HDL 59 10/25/2017    TRIG 129 10/25/2017       Lab Results   Component Value Date    TSH 5.410 (H) 10/25/2017    FREET4 1.13 10/25/2017       Lab Results   Component Value Date    WBC 6.14 10/25/2017    HGB 14.4 10/25/2017    HGB 14.5 05/23/2016     10/25/2017       Imaging:         Medical Tests:         Summary of old records / correspondence / consultant report:         Request outside records:         ALLERGIES  Allergies   Allergen Reactions   • Benazepril Cough        MEDICATIONS  Current Outpatient Prescriptions   Medication Sig Dispense Refill   • amLODIPine (NORVASC)  10 MG tablet Take 1 tablet by mouth Daily. 30 tablet 3   • calcium carbonate (OS-ROCIO) 600 MG tablet Take 600 mg by mouth daily.     • cholecalciferol (VITAMIN D3) 1000 UNITS tablet Take 1,000 Units by mouth daily.     • Flaxseed, Linseed, (FLAXSEED OIL PO) Take  by mouth.     • fluticasone (FLONASE) 50 MCG/ACT nasal spray INSTILL 2 SPRAYS INTO EACH NOSTRIL DAILY. 48 mL 0   • Multiple Vitamins-Minerals ([employer]S SPECTRAVITE ADULT 50+ PO) Take  by mouth.     • Probiotic Product (PROBIOTIC DAILY PO) Take  by mouth.       No current facility-administered medications for this visit.        PFSH:     The following portions of the patient's history were reviewed and updated as appropriate: Allergies / Current Medications / Past Medical History / Surgical History / Social History / Family History    PROBLEM LIST   Patient Active Problem List   Diagnosis   • Hypertension, essential   • Dupuytren's contracture of hand (Keo Bauer)   • Glaucoma associated with ocular disorder (Conrd)   • Low back pain   • Carotid stenosis mild ( 2/2012)   • Blepharitis of both eyes   • Lichen sclerosus (GYN = isabell Garza)   • Agoraphobia   • Urinary incontinence in female   • Squamous cell carcinoma   • Osteopenia   • FH: uterine cancer   • FH: CAD (coronary artery disease)   • Vitamin D deficiency disease   • Postmenopausal   • Panic anxiety syndrome (Xanax)   • Hayfever   • Osteoarthritis   • Routine health maintenance   • Cough due to ACE inhibitor   • COPD (chronic obstructive pulmonary disease)   • Pure hypercholesterolemia   • Palpitations   • Hyperlipidemia   • Subclinical hypothyroidism       PAST MEDICAL HISTORY  Past Medical History:   Diagnosis Date   • Allergic    • Anxiety    • Arthritis    • Cataract    • COPD (chronic obstructive pulmonary disease)     Mild obstructive defect noted 2017   • Emphysema of lung    • Hypertension    • Low back pain    • Osteopenia    • Urinary tract infection    • Visual impairment        SURGICAL  HISTORY  Past Surgical History:   Procedure Laterality Date   • CYST REMOVAL Right 2015    Neck- Raya Bauer   • EYE SURGERY Left 1955    to repair lazy eye   • LAPAROSCOPIC TUBAL LIGATION Bilateral    • TUBAL ABDOMINAL LIGATION         SOCIAL HISTORY  Social History     Social History   • Marital status:      Spouse name: none   • Number of children: 2   • Years of education: 12      Occupational History   • 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[employer]     Social History Main Topics   • Smoking status: Never Smoker   • Smokeless tobacco: Never Used   • Alcohol use No   • Drug use: No   • Sexual activity: No     Other Topics Concern   • Not on file     Social History Narrative    Hobby water aerobics, yard work       FAMILY HISTORY  Family History   Problem Relation Age of Onset   • Arthritis Mother         • Cancer Mother      Esophagus   • Glaucoma Mother    • Hypertension Mother         • Stroke Mother         • Osteoporosis Mother    • Ovarian cancer Mother    • Heart disease Mother      Congestive heart   • Heart disease Father    • Stroke Father         • Early death Father      Ceraboral hemmorage   • Hypertension Father         • Thyroid disease Sister    • Hyperlipidemia Brother    • Hypertension Brother    • Asthma Daughter    • Heart disease Maternal Aunt    • Asthma Daughter      She is on meds   • Heart disease Maternal Aunt      Congestive heart   • Heart disease Paternal Aunt      Stroke   • Hyperlipidemia Brother      On meds   • Hypertension Brother    • Stroke Maternal Aunt      On meds   • Thyroid disease Sister      On meds         **Jose Disclaimer:   Much of this encounter note is an electronic transcription/translation of spoken language to printed text. The electronic translation of spoken language may permit erroneous, or at times, nonsensical words or phrases to be inadvertently transcribed. Although I  have reviewed the note for such errors, some may still exist.

## 2018-04-26 NOTE — PROGRESS NOTES
All labs are within normal / acceptable ranges. Continue all current meds as they are. A followup visit for 6 weeks has been advised by Dr Joaquin.

## 2018-05-03 ENCOUNTER — OFFICE VISIT (OUTPATIENT)
Dept: INTERNAL MEDICINE | Age: 68
End: 2018-05-03

## 2018-05-03 VITALS
HEART RATE: 101 BPM | TEMPERATURE: 97.2 F | OXYGEN SATURATION: 96 % | WEIGHT: 145 LBS | SYSTOLIC BLOOD PRESSURE: 132 MMHG | BODY MASS INDEX: 26.68 KG/M2 | DIASTOLIC BLOOD PRESSURE: 80 MMHG | HEIGHT: 62 IN

## 2018-05-03 DIAGNOSIS — B34.9 VIRAL SYNDROME: Primary | ICD-10-CM

## 2018-05-03 PROCEDURE — 99213 OFFICE O/P EST LOW 20 MIN: CPT | Performed by: INTERNAL MEDICINE

## 2018-05-03 NOTE — PROGRESS NOTES
Subjective   Sandi Acevedo is a 67 y.o. female.     History of Present Illness     Presents today with upper respiratory symptoms over the past 4 days.  She does have a sore throat, congestion, and cough.  Cough is loose, but patient has not noticed the color of mucus.  She denied fever, chills, sweats, anorexia.  There is no chest pain, shortness breath, wheezing noted.  She does have hoarseness with her voice.  Patient is a nonsmoker.  General knowledge, she has not been associated with anybody with similar symptoms.  Of note, the symptoms seem to occur after she had a pneumonia shot last week.  I reassured her that I do not believe these symptoms are referable to the shot.  She does use Claritin on a regular basis at night for allergy symptoms, and has been doing so for the past year.    The following portions of the patient's history were reviewed and updated as appropriate: allergies, current medications, past medical history, past social history and problem list.    Review of Systems   Gastrointestinal: Negative for diarrhea, nausea and vomiting.   Skin: Negative for rash.   Neurological: Negative for headaches.   Hematological: Negative for adenopathy.       Objective   Physical Exam   Constitutional: She is oriented to person, place, and time. She appears well-developed and well-nourished. No distress.   HENT:   Nose: Right sinus exhibits no maxillary sinus tenderness and no frontal sinus tenderness. Left sinus exhibits no maxillary sinus tenderness and no frontal sinus tenderness.   Neck: Normal range of motion. Neck supple.   Pulmonary/Chest: Effort normal and breath sounds normal. No respiratory distress. She has no wheezes.   No egophony noted   Lymphadenopathy:     She has no cervical adenopathy.   Neurological: She is alert and oriented to person, place, and time.   Skin: Skin is warm and dry. She is not diaphoretic.   Psychiatric: She has a normal mood and affect. Her behavior is normal. Judgment and  thought content normal.   Nursing note and vitals reviewed.      Assessment/Plan   Sandi was seen today for cough and sore throat.    Diagnoses and all orders for this visit:    Viral syndrome      Number-one viral syndrome, not related to Pneumovax.  Recommend fluids, voice rest, Tylenol 4 times a day, shower steam in the morning, vaporizer the bedroom the evening.  I also advised patient to change from Claritin to either Zyrtec or Allegra.  She can try this over the weekend if symptoms persist or worsen she can contact us as needed.  I would expect resolution over the next 3-5 days.

## 2018-05-23 ENCOUNTER — OFFICE VISIT (OUTPATIENT)
Dept: CARDIOLOGY | Age: 68
End: 2018-05-23

## 2018-05-23 VITALS
HEIGHT: 62 IN | WEIGHT: 146 LBS | SYSTOLIC BLOOD PRESSURE: 137 MMHG | BODY MASS INDEX: 26.87 KG/M2 | HEART RATE: 83 BPM | DIASTOLIC BLOOD PRESSURE: 76 MMHG

## 2018-05-23 DIAGNOSIS — I65.29 STENOSIS OF CAROTID ARTERY, UNSPECIFIED LATERALITY: Primary | ICD-10-CM

## 2018-05-23 DIAGNOSIS — R00.2 PALPITATIONS: ICD-10-CM

## 2018-05-23 DIAGNOSIS — E78.5 HYPERLIPIDEMIA, UNSPECIFIED HYPERLIPIDEMIA TYPE: ICD-10-CM

## 2018-05-23 DIAGNOSIS — I10 HYPERTENSION, ESSENTIAL: ICD-10-CM

## 2018-05-23 PROCEDURE — 99213 OFFICE O/P EST LOW 20 MIN: CPT | Performed by: INTERNAL MEDICINE

## 2018-05-23 PROCEDURE — 93000 ELECTROCARDIOGRAM COMPLETE: CPT | Performed by: INTERNAL MEDICINE

## 2018-05-23 NOTE — PROGRESS NOTES
Subjective:       Sandi Acevedo is a 67 y.o. female who here for follow up    CC  The follow-up for the hypertension palpitations  HPI  67-year-old female with a known history of the benign essential arterial hypertension, carotid stenosis and palpitations along with a history of the hyperlipidemia here for the follow-up with no complaints of chest pains or tightness in chest     Problem List Items Addressed This Visit        Cardiovascular and Mediastinum    Hypertension, essential    Carotid stenosis mild ( 2012) - Primary    Palpitations    Hyperlipidemia        .    The following portions of the patient's history were reviewed and updated as appropriate: allergies, current medications, past family history, past medical history, past social history, past surgical history and problem list.    Past Medical History:   Diagnosis Date   • Allergic    • Anxiety    • Arthritis    • Cataract    • COPD (chronic obstructive pulmonary disease)     Mild obstructive defect noted    • Emphysema of lung    • Hypertension    • Low back pain    • Osteopenia    • Urinary tract infection    • Visual impairment     reports that she has never smoked. She has never used smokeless tobacco. She reports that she does not drink alcohol or use drugs.  Family History   Problem Relation Age of Onset   • Arthritis Mother            • Cancer Mother         Esophagus   • Glaucoma Mother    • Hypertension Mother            • Stroke Mother            • Osteoporosis Mother    • Ovarian cancer Mother    • Heart disease Mother         Congestive heart   • Heart disease Father    • Stroke Father            • Early death Father         Ceraboral hemmorage   • Hypertension Father            • Thyroid disease Sister    • Hyperlipidemia Brother    • Hypertension Brother    • Asthma Daughter    • Heart disease Maternal Aunt    • Asthma Daughter         She is on meds   • Heart disease Maternal Aunt          "Congestive heart   • Heart disease Paternal Aunt         Stroke   • Hyperlipidemia Brother         On meds   • Hypertension Brother    • Stroke Maternal Aunt         On meds   • Thyroid disease Sister         On meds       Review of Systems  Constitutional: No wt loss, fever, fatigue  Gastrointestinal: No nausea, abdominal pain  Behavioral/Psych: No insomnia or anxiety   Cardiovascular No chest pains or tightness in chest  Objective:       Physical Exam           Physical Exam  /76 (BP Location: Left arm, Patient Position: Sitting)   Pulse 83   Ht 157.5 cm (62\")   Wt 66.2 kg (146 lb)   BMI 26.70 kg/m²     General appearance: NAD, conversant   Eyes: anicteric sclerae, moist conjunctivae; no lid-lag; PERRLA   HENT: Atraumatic; oropharynx clear with moist mucous membranes and no mucosal ulcerations;  normal hard and soft palate   Neck: Trachea midline; FROM, supple, no thyromegaly or lymphadenopathy   Lungs: CTA, with normal respiratory effort and no intercostal retractions   CV: S1-S2 regular, no murmurs, no rub, no gallop   Abdomen: Soft, non-tender; no masses or HSM   Extremities: No peripheral edema or extremity lymphadenopathy  Skin: Normal temperature, turgor and texture; no rash, ulcers or subcutaneous nodules   Psych: Appropriate affect, alert and oriented to person, place and time           Cardiographics  @  ECG 12 Lead  Date/Time: 5/23/2018 2:52 PM  Performed by: KATEY JUARES  Authorized by: KATEY JUARES   Comparison: compared with previous ECG   Similar to previous ECG  Rhythm: sinus rhythm  ST Flattening: all  Clinical impression: non-specific ECG            Echocardiogram:        Current Outpatient Prescriptions:   •  amLODIPine (NORVASC) 10 MG tablet, Take 1 tablet by mouth Daily. (Patient taking differently: Take 10 mg by mouth Daily. 7.5 mg qd), Disp: 30 tablet, Rfl: 3  •  calcium carbonate (OS-ROCIO) 600 MG tablet, Take 600 mg by mouth daily., Disp: , Rfl:   •  " cholecalciferol (VITAMIN D3) 1000 UNITS tablet, Take 1,000 Units by mouth daily., Disp: , Rfl:   •  Flaxseed, Linseed, (FLAXSEED OIL PO), Take  by mouth., Disp: , Rfl:   •  fluticasone (FLONASE) 50 MCG/ACT nasal spray, INSTILL 2 SPRAYS INTO EACH NOSTRIL DAILY., Disp: 48 mL, Rfl: 0  •  Multiple Vitamins-Minerals ([employer] SPECTRAVITE ADULT 50+ PO), Take  by mouth., Disp: , Rfl:   •  Probiotic Product (PROBIOTIC DAILY PO), Take  by mouth., Disp: , Rfl:    Assessment:        Patient Active Problem List   Diagnosis   • Hypertension, essential   • Dupuytren's contracture of hand (Keo Bauer)   • Glaucoma associated with ocular disorder (Conrd)   • Low back pain   • Carotid stenosis mild ( 2/2012)   • Blepharitis of both eyes   • Lichen sclerosus (GYN = isabell Garza)   • Agoraphobia   • Urinary incontinence in female   • Squamous cell carcinoma   • Osteopenia   • FH: uterine cancer   • FH: CAD (coronary artery disease)   • Vitamin D deficiency disease   • Postmenopausal   • Panic anxiety syndrome (Xanax)   • Hayfever   • Osteoarthritis   • Routine health maintenance   • Cough due to ACE inhibitor   • COPD (chronic obstructive pulmonary disease)   • Pure hypercholesterolemia   • Palpitations   • Hyperlipidemia   • Subclinical hypothyroidism     Total Cholesterol 0 - 200 mg/dL 205     Triglycerides 0 - 150 mg/dL 127    HDL Cholesterol 40 - 60 mg/dL 56    VLDL Cholesterol 5 - 40 mg/dL 25.4    LDL Cholesterol  0 - 100 mg/dL 124                 Plan:            ICD-10-CM ICD-9-CM   1. Stenosis of carotid artery, unspecified laterality I65.29 433.10   2. Hyperlipidemia, unspecified hyperlipidemia type E78.5 272.4   3. Hypertension, essential I10 401.9   4. Palpitations R00.2 785.1     1. Stenosis of carotid artery, unspecified laterality  Consider start aspirin    2. Hyperlipidemia, unspecified hyperlipidemia type  *Counseling done    3. Hypertension, essential  Under control    4. Palpitations  Under control     Pros and cons of ASA  in primary and secondary prevention of CAD has been discussed.  Risks of bleeding and other possible side effects have been discussed, Diff advantages and disadvantages of 325 vs 81  Mg of ASA were discussed, at this stage it has been recommended to start ASA 81 mg /day       See in 1 yr  COUNSELING:    Sandi Rosa was given to patient for the following topics: diagnostic results, risk factor reductions, impressions, risks and benefits of treatment options and importance of treatment compliance .       SMOKING COUNSELING:    Counseling given: Not Answered      EMR Dragon/Transcription disclaimer:   Much of this encounter note is an electronic transcription/translation of spoken language to printed text. The electronic translation of spoken language may permit erroneous, or at times, nonsensical words or phrases to be inadvertently transcribed; Although I have reviewed the note for such errors, some may still exist.

## 2018-06-06 ENCOUNTER — OFFICE VISIT (OUTPATIENT)
Dept: INTERNAL MEDICINE | Age: 68
End: 2018-06-06

## 2018-06-06 VITALS
SYSTOLIC BLOOD PRESSURE: 150 MMHG | HEIGHT: 62 IN | BODY MASS INDEX: 26.79 KG/M2 | WEIGHT: 145.6 LBS | OXYGEN SATURATION: 98 % | TEMPERATURE: 97.5 F | HEART RATE: 75 BPM | DIASTOLIC BLOOD PRESSURE: 80 MMHG

## 2018-06-06 DIAGNOSIS — I10 HYPERTENSION, ESSENTIAL: Primary | ICD-10-CM

## 2018-06-06 PROCEDURE — 99213 OFFICE O/P EST LOW 20 MIN: CPT | Performed by: INTERNAL MEDICINE

## 2018-06-06 RX ORDER — AMLODIPINE BESYLATE 5 MG/1
10 TABLET ORAL DAILY
Qty: 60 TABLET | Refills: 3 | Status: SHIPPED | OUTPATIENT
Start: 2018-06-06 | End: 2018-08-08 | Stop reason: SDUPTHER

## 2018-06-06 RX ORDER — AMLODIPINE BESYLATE 5 MG/1
10 TABLET ORAL DAILY
COMMUNITY
End: 2018-06-06 | Stop reason: SDUPTHER

## 2018-06-06 NOTE — PROGRESS NOTES
"[facility]        Sandi V Kristina / 67 y.o. / female  06/06/2018      ASSESSMENT & PLAN:    Problem List Items Addressed This Visit     None        No orders of the defined types were placed in this encounter.      Summary/Discussion:    Number-one hypertension stage I elevation, needs improved control.  Increase amlodipine from 7.5 mg per day to 10 mg per day follow-up with me 6 weeks.  New prescription has been sent to the pharmacy.      Return in about 6 weeks (around 7/18/2018) for Blood pressure check.    ____________________________________________________________________    VITALS    Visit Vitals  /80   Pulse 75   Temp 97.5 °F (36.4 °C)   Ht 157.5 cm (62.01\")   Wt 66 kg (145 lb 9.6 oz)   SpO2 98%   BMI 26.62 kg/m²       BP Readings from Last 3 Encounters:   06/06/18 150/80   05/23/18 137/76   05/03/18 132/80     Wt Readings from Last 3 Encounters:   06/06/18 66 kg (145 lb 9.6 oz)   05/23/18 66.2 kg (146 lb)   05/03/18 65.8 kg (145 lb)      Body mass index is 26.62 kg/m².    CC:  Main reason(s) for today's visit: Hypertension      HPI:     Patient presents this morning for follow-up of last office visit from April 25 of this year.  That time her blood pressure was elevated.  Because of that Norvasc was increased to 10 mg, unfortunate she had symptoms of orthostasis.  Because that we reduce the dosage is 7.5 mg per day and she presents today for blood pressure check.  She is exercising, and she does not use salt and caffeine in her diet.  She does monitor blood pressure the outpatient setting, home blood pressure readings in the 130s over 70s are recorded.    Laboratory review April 25, 2018 CMP, TSH, lipids, urinalysis normal, see below for specific values.    Patient Care Team:  Uri Joaquin MD as PCP - General (Internal Medicine)  Uri F Joaquin, MD as PCP - Claims Attributed  ____________________________________________________________________    REVIEW OF SYSTEMS    Review of Systems "   Respiratory: Negative for chest tightness and shortness of breath.    Cardiovascular: Negative for chest pain and palpitations.   Neurological: Negative for dizziness, syncope, speech difficulty, weakness, light-headedness and headaches.           PHYSICAL EXAMINATION    Physical Exam   Constitutional: She is oriented to person, place, and time. She appears well-developed. No distress.   0/w   Cardiovascular: Normal rate, regular rhythm, normal heart sounds and intact distal pulses.  Exam reveals no gallop and no friction rub.    No murmur heard.  Pulmonary/Chest: Effort normal and breath sounds normal. She has no wheezes. She has no rales.   Musculoskeletal: She exhibits no edema.   Neurological: She is alert and oriented to person, place, and time.   Skin: Skin is warm and dry. No rash noted.   Psychiatric: She has a normal mood and affect. Her behavior is normal. Judgment and thought content normal.   Nursing note and vitals reviewed.      REVIEWED DATA:    Labs:     Lab Results   Component Value Date     04/25/2018    K 4.2 04/25/2018    AST 28 04/25/2018    ALT 28 04/25/2018    BUN 12 04/25/2018    CREATININE 0.82 04/25/2018    CREATININE 0.91 10/25/2017    CREATININE 0.88 04/28/2017    EGFRIFNONA 69 04/25/2018    EGFRIFAFRI 84 04/25/2018       No results found for: HGBA1C, GLUCOSE, MICROALBUR    Lab Results   Component Value Date     (H) 04/25/2018     (H) 10/25/2017     (H) 04/28/2017    HDL 56 04/25/2018    TRIG 127 04/25/2018       Lab Results   Component Value Date    TSH 3.220 04/25/2018    FREET4 1.22 04/25/2018       Lab Results   Component Value Date    WBC 6.14 10/25/2017    HGB 14.4 10/25/2017    HGB 14.5 05/23/2016     10/25/2017       Imaging:         Medical Tests:         Summary of old records / correspondence / consultant report:         Request outside records:         ALLERGIES  Allergies   Allergen Reactions   • Benazepril Cough        MEDICATIONS  Current  Outpatient Prescriptions   Medication Sig Dispense Refill   • amLODIPine (NORVASC) 5 MG tablet Take 2 tablets by mouth Daily. 60 tablet 3   • ASPIRIN 81 PO Take 1 tablet by mouth Daily.     • calcium carbonate (OS-ROCIO) 600 MG tablet Take 600 mg by mouth daily.     • cholecalciferol (VITAMIN D3) 1000 UNITS tablet Take 1,000 Units by mouth daily.     • Flaxseed, Linseed, (FLAXSEED OIL PO) Take  by mouth.     • fluticasone (FLONASE) 50 MCG/ACT nasal spray INSTILL 2 SPRAYS INTO EACH NOSTRIL DAILY. 48 mL 0   • Multiple Vitamins-Minerals ([employer] SPECTRAVITE ADULT 50+ PO) Take  by mouth.     • Probiotic Product (PROBIOTIC DAILY PO) Take  by mouth.       No current facility-administered medications for this visit.        PFSH:     The following portions of the patient's history were reviewed and updated as appropriate: Allergies / Current Medications / Past Medical History / Surgical History / Social History / Family History    PROBLEM LIST   Patient Active Problem List   Diagnosis   • Hypertension, essential   • Dupuytren's contracture of hand (Keo Bauer)   • Glaucoma associated with ocular disorder (Conrd)   • Low back pain   • Carotid stenosis mild ( 2/2012)   • Blepharitis of both eyes   • Lichen sclerosus (GYN = isabell Garza)   • Agoraphobia   • Urinary incontinence in female   • Squamous cell carcinoma   • Osteopenia   • FH: uterine cancer   • FH: CAD (coronary artery disease)   • Vitamin D deficiency disease   • Postmenopausal   • Panic anxiety syndrome (Xanax)   • Hayfever   • Osteoarthritis   • Routine health maintenance   • Cough due to ACE inhibitor   • COPD (chronic obstructive pulmonary disease)   • Pure hypercholesterolemia   • Palpitations   • Hyperlipidemia   • Subclinical hypothyroidism       PAST MEDICAL HISTORY  Past Medical History:   Diagnosis Date   • Allergic    • Anxiety    • Arthritis    • Cataract    • COPD (chronic obstructive pulmonary disease)     Mild obstructive defect noted 2017   • Emphysema of  lung    • Hypertension    • Low back pain    • Osteopenia    • Urinary tract infection    • Visual impairment        SURGICAL HISTORY  Past Surgical History:   Procedure Laterality Date   • CYST REMOVAL Right 2015    Neck- Raya Bauer   • EYE SURGERY Left 1955    to repair lazy eye   • LAPAROSCOPIC TUBAL LIGATION Bilateral    • TUBAL ABDOMINAL LIGATION         SOCIAL HISTORY  Social History     Social History   • Marital status:      Spouse name: none   • Number of children: 2   • Years of education: 12      Occupational History   • 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[employer]     Social History Main Topics   • Smoking status: Never Smoker   • Smokeless tobacco: Never Used   • Alcohol use No   • Drug use: No   • Sexual activity: No     Other Topics Concern   • Not on file     Social History Narrative    Hobby water aerobics, yard work       FAMILY HISTORY  Family History   Problem Relation Age of Onset   • Arthritis Mother            • Cancer Mother         Esophagus   • Glaucoma Mother    • Hypertension Mother            • Stroke Mother            • Osteoporosis Mother    • Ovarian cancer Mother    • Heart disease Mother         Congestive heart   • Heart disease Father    • Stroke Father            • Early death Father         Ceraboral hemmorage   • Hypertension Father            • Thyroid disease Sister    • Hyperlipidemia Brother    • Hypertension Brother    • Asthma Daughter    • Heart disease Maternal Aunt    • Asthma Daughter         She is on meds   • Heart disease Maternal Aunt         Congestive heart   • Heart disease Paternal Aunt         Stroke   • Hyperlipidemia Brother         On meds   • Hypertension Brother    • Stroke Maternal Aunt         On meds   • Thyroid disease Sister         On meds         **Jose Disclaimer:   Much of this encounter note is an electronic transcription/translation of spoken language to  printed text. The electronic translation of spoken language may permit erroneous, or at times, nonsensical words or phrases to be inadvertently transcribed. Although I have reviewed the note for such errors, some may still exist.

## 2018-07-02 RX ORDER — PAROXETINE HYDROCHLORIDE 20 MG/1
20 TABLET, FILM COATED ORAL EVERY MORNING
Qty: 30 TABLET | Refills: 3 | Status: SHIPPED | OUTPATIENT
Start: 2018-07-02 | End: 2018-07-18 | Stop reason: SDDI

## 2018-07-11 ENCOUNTER — DOCUMENTATION (OUTPATIENT)
Dept: INTERNAL MEDICINE | Age: 68
End: 2018-07-11

## 2018-07-11 RX ORDER — LORAZEPAM 0.5 MG/1
0.5 TABLET ORAL AS NEEDED
Qty: 30 TABLET | Refills: 0 | OUTPATIENT
Start: 2018-07-11 | End: 2018-11-21

## 2018-07-11 NOTE — TELEPHONE ENCOUNTER
----- Message -----     From: Sandi Acevedo     Sent: 7/10/2018  3:49 PM EDT       To: Uri Joaquin MD  Subject: RE: Non-Urgent Medical Question    Hi  I need a low dose of shira if I need one now and then.You know my history,I do not like meds. This would be if I get way overwhelmed. A security option.  Thanks  Sandi HENRIQUEZ 1950  ----- Message -----  From: Uri Joaquin MD  Sent: 2018  4:39 PM EDT  To: Sandi Acevedo  Subject: RE: Non-Urgent Medical Question  Paxil  is an SSRI agent which is helpful for both anxiety and depression.  The main side effect is sedation which will help with your insomnia as you are  going through this difficult time.  The suicidal side effect is rarely seen and only in a very young population in the 20s and 30 year-old age group.  I use this quite often for this very similar indication and people seem to do very well on it.  The on-demand benzodiazepine, which I assume is what you are alluding to, can be sedating when taken all the time and consequently you would be sleepy throughout the day.  Think about this overnight and get back to me in the morning.  Dr. Joaquin    ----- Message -----     From: Sandi Acevedo     Sent: 2018  3:20 PM EDT       To: Uri Joaquin MD  Subject: Non-Urgent Medical Question    Hello Dr. Joaquin  I do not want to start taking Paxil. I am interested in something mild for my anxieties (low dose).  Any suggestions? A VERY rough time for me now.  Thank you  Sandi Acevedo   1950

## 2018-07-11 NOTE — TELEPHONE ENCOUNTER
Please run a Carlos report.  If this is unremarkable, we will send a prescription for Ativan 0.5 mg 1 tablet as needed for anxiety dispense 30 refill ×0.  Her partner is dying of cancer, I do not think this will be a long-term medication prescription.

## 2018-07-18 ENCOUNTER — OFFICE VISIT (OUTPATIENT)
Dept: INTERNAL MEDICINE | Age: 68
End: 2018-07-18

## 2018-07-18 VITALS
TEMPERATURE: 97.3 F | HEIGHT: 62 IN | SYSTOLIC BLOOD PRESSURE: 140 MMHG | OXYGEN SATURATION: 98 % | BODY MASS INDEX: 26.83 KG/M2 | HEART RATE: 70 BPM | DIASTOLIC BLOOD PRESSURE: 80 MMHG | WEIGHT: 145.8 LBS

## 2018-07-18 DIAGNOSIS — F32.9 REACTIVE DEPRESSION: ICD-10-CM

## 2018-07-18 DIAGNOSIS — I10 HYPERTENSION, ESSENTIAL: Primary | ICD-10-CM

## 2018-07-18 PROBLEM — M72.2 PLANTAR FASCIITIS OF LEFT FOOT: Status: ACTIVE | Noted: 2018-07-01

## 2018-07-18 PROCEDURE — 99213 OFFICE O/P EST LOW 20 MIN: CPT | Performed by: INTERNAL MEDICINE

## 2018-07-18 NOTE — PROGRESS NOTES
"[facility]  WILLIAM PEREZ MD      Sandi V Kristina / 67 y.o. / female  07/18/2018      ASSESSMENT & PLAN:    Problem List Items Addressed This Visit        Unprioritized    Hypertension, essential - Primary    Relevant Medications    amLODIPine (NORVASC) 5 MG tablet      Other Visit Diagnoses     Reactive depression            No orders of the defined types were placed in this encounter.      Summary/Discussion:    Number-one hypertension stable on current medication.  Plan: Same meds, blood pressure check 4 months.    #2 depression/anxiety/stress at this point, patient is doing well with this with counseling and use of Ativan on an as-needed basis.  She'll contact me if she needs any additional intervention    Return in about 4 months (around 11/18/2018), or AWV  with Leeanna, for Blood pressure check, Annual wellness visit.    ____________________________________________________________________    VITALS    Visit Vitals  /80   Pulse 70   Temp 97.3 °F (36.3 °C)   Ht 157.5 cm (62.01\")   Wt 66.1 kg (145 lb 12.8 oz)   SpO2 98%   BMI 26.66 kg/m²       BP Readings from Last 3 Encounters:   07/18/18 140/80   06/06/18 150/80   05/23/18 137/76     Wt Readings from Last 3 Encounters:   07/18/18 66.1 kg (145 lb 12.8 oz)   06/06/18 66 kg (145 lb 9.6 oz)   05/23/18 66.2 kg (146 lb)      Body mass index is 26.66 kg/m².    CC:  Main reason(s) for today's visit: Hypertension      HPI:     Patient presents this morning for follow-up of hypertension.  She is compliant with medication, and dietary salt restriction.  Exercise is limited at this time.    Depression/anxiety, patient's partner is actively dying from lung cancer.  She is providing care along with hospice.  She was provided with a prescription of lorazepam by me to be used 0.5 mg once a day as needed, she has not yet used any of the medication.        Patient Care Team:  William Perez MD as PCP - General (Internal Medicine)  William Perez MD as PCP - " Claims Attributed  ____________________________________________________________________    REVIEW OF SYSTEMS    Review of Systems   Respiratory: Negative for chest tightness and shortness of breath.    Cardiovascular: Negative for chest pain and palpitations.   Neurological: Negative for dizziness, syncope, speech difficulty, weakness, light-headedness and headaches.         PHYSICAL EXAMINATION    Physical Exam   Constitutional: She is oriented to person, place, and time. She appears well-developed and well-nourished. No distress.   Cardiovascular: Normal rate, regular rhythm, normal heart sounds and intact distal pulses.  Exam reveals no gallop and no friction rub.    No murmur heard.  Pulmonary/Chest: Effort normal and breath sounds normal. She has no wheezes. She has no rales.   Musculoskeletal: She exhibits no edema.   Neurological: She is alert and oriented to person, place, and time.   Skin: Skin is warm and dry. No rash noted.   Psychiatric: She has a normal mood and affect. Her behavior is normal. Judgment and thought content normal.   Nursing note and vitals reviewed.        REVIEWED DATA:    Labs:     Lab Results   Component Value Date     04/25/2018    K 4.2 04/25/2018    AST 28 04/25/2018    ALT 28 04/25/2018    BUN 12 04/25/2018    CREATININE 0.82 04/25/2018    CREATININE 0.91 10/25/2017    CREATININE 0.88 04/28/2017    EGFRIFNONA 69 04/25/2018    EGFRIFAFRI 84 04/25/2018       No results found for: HGBA1C, GLUCOSE, MICROALBUR    Lab Results   Component Value Date     (H) 04/25/2018     (H) 10/25/2017     (H) 04/28/2017    HDL 56 04/25/2018    TRIG 127 04/25/2018       Lab Results   Component Value Date    TSH 3.220 04/25/2018    FREET4 1.22 04/25/2018       Lab Results   Component Value Date    WBC 6.14 10/25/2017    HGB 14.4 10/25/2017    HGB 14.5 05/23/2016     10/25/2017       No results found for: PSA      Imaging:         Medical Tests:         Summary of old  records / correspondence / consultant report:         Request outside records:         ALLERGIES  Allergies   Allergen Reactions   • Benazepril Cough        MEDICATIONS  Current Outpatient Prescriptions   Medication Sig Dispense Refill   • amLODIPine (NORVASC) 5 MG tablet Take 2 tablets by mouth Daily. 60 tablet 3   • ASPIRIN 81 PO Take 1 tablet by mouth Daily.     • calcium carbonate (OS-ROCIO) 600 MG tablet Take 600 mg by mouth daily.     • cholecalciferol (VITAMIN D3) 1000 UNITS tablet Take 1,000 Units by mouth daily.     • Flaxseed, Linseed, (FLAXSEED OIL PO) Take  by mouth.     • fluticasone (FLONASE) 50 MCG/ACT nasal spray INSTILL 2 SPRAYS INTO EACH NOSTRIL DAILY. 48 mL 0   • LORazepam (ATIVAN) 0.5 MG tablet Take 1 tablet by mouth As Needed for Anxiety. 30 tablet 0   • Multiple Vitamins-Minerals ([facility] SPECTRAVITE ADULT 50+ PO) Take  by mouth.     • Probiotic Product (PROBIOTIC DAILY PO) Take  by mouth.       No current facility-administered medications for this visit.        PFSH:     The following portions of the patient's history were reviewed and updated as appropriate: Allergies / Current Medications / Past Medical History / Surgical History / Social History / Family History    PROBLEM LIST   Patient Active Problem List   Diagnosis   • Hypertension, essential   • Dupuytren's contracture of hand (Keo Bauer)   • Glaucoma associated with ocular disorder (Conrd)   • Low back pain   • Carotid stenosis mild ( 2/2012)   • Blepharitis of both eyes   • Lichen sclerosus (GYN = isabell Garza)   • Agoraphobia   • Urinary incontinence in female   • Squamous cell carcinoma   • Osteopenia   • FH: uterine cancer   • FH: CAD (coronary artery disease)   • Vitamin D deficiency disease   • Postmenopausal   • Panic anxiety syndrome (Xanax)   • Hayfever   • Osteoarthritis   • Routine health maintenance   • Cough due to ACE inhibitor   • COPD (chronic obstructive pulmonary disease) (CMS/[facility])   • Pure hypercholesterolemia   •  Palpitations   • Hyperlipidemia   • Subclinical hypothyroidism   • Plantar fasciitis of left foot       PAST MEDICAL HISTORY  Past Medical History:   Diagnosis Date   • Allergic    • Anxiety    • Arthritis    • Cataract    • COPD (chronic obstructive pulmonary disease) (CMS/[facility])     Mild obstructive defect noted    • Emphysema of lung (CMS/HCC)    • Hypertension    • Low back pain    • Osteopenia    • Plantar fasciitis of left foot 2018   • Urinary tract infection    • Visual impairment        SURGICAL HISTORY  Past Surgical History:   Procedure Laterality Date   • CYST REMOVAL Right 2015    Neck- Raya Bauer   • EYE SURGERY Left 1955    to repair lazy eye   • LAPAROSCOPIC TUBAL LIGATION Bilateral    • TUBAL ABDOMINAL LIGATION         SOCIAL HISTORY  Social History     Social History   • Marital status:      Spouse name: none   • Number of children: 2   • Years of education: 12      Occupational History   • 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[employer]     Social History Main Topics   • Smoking status: Never Smoker   • Smokeless tobacco: Never Used   • Alcohol use No   • Drug use: No   • Sexual activity: No     Other Topics Concern   • Not on file     Social History Narrative    Hobby water aerobics, yard work       FAMILY HISTORY  Family History   Problem Relation Age of Onset   • Arthritis Mother            • Cancer Mother         Esophagus   • Glaucoma Mother    • Hypertension Mother            • Stroke Mother            • Osteoporosis Mother    • Ovarian cancer Mother    • Heart disease Mother         Congestive heart   • Heart disease Father    • Stroke Father            • Early death Father         Ceraboral hemmorage   • Hypertension Father            • Thyroid disease Sister    • Hyperlipidemia Brother    • Hypertension Brother    • Asthma Daughter    • Heart disease Maternal Aunt    • Asthma Daughter         She is on  meds   • Heart disease Maternal Aunt         Congestive heart   • Heart disease Paternal Aunt         Stroke   • Hyperlipidemia Brother         On meds   • Hypertension Brother    • Stroke Maternal Aunt         On meds   • Thyroid disease Sister         On meds       Health Maintenance Due   Topic   • MEDICARE ANNUAL WELLNESS    • COLONOSCOPY    • ZOSTER VACCINE (2 of 2)   • TDAP/TD VACCINES (2 - Td)       Overdue health maintenance interventions  reviewed with patient.    Dictated utilizing Dragon voice recognition software

## 2018-08-08 RX ORDER — AMLODIPINE BESYLATE 5 MG/1
10 TABLET ORAL DAILY
Qty: 180 TABLET | Refills: 1 | Status: SHIPPED | OUTPATIENT
Start: 2018-08-08 | End: 2018-11-21 | Stop reason: DRUGHIGH

## 2018-08-20 ENCOUNTER — TRANSCRIBE ORDERS (OUTPATIENT)
Dept: ADMINISTRATIVE | Facility: HOSPITAL | Age: 68
End: 2018-08-20

## 2018-08-20 DIAGNOSIS — Z12.31 VISIT FOR SCREENING MAMMOGRAM: Primary | ICD-10-CM

## 2018-08-30 ENCOUNTER — TELEPHONE (OUTPATIENT)
Dept: INTERNAL MEDICINE | Age: 68
End: 2018-08-30

## 2018-08-30 NOTE — TELEPHONE ENCOUNTER
Pt called stating she sent a My Chart note to Dr Joaquin stating her spouse had passed away 7-28-18 and she isn't sleeping well. Pt asking if Dr Joaquin would prescribe something mild to help her sleep. Pt sounded anxious and nervous on the phone.  I told pt the note from My Chart was sent to Dr Joaquin and she just wanted me to add that she called again.  Thanks SP

## 2018-08-30 NOTE — TELEPHONE ENCOUNTER
Pt does still have the Ativan. She has only taken one pill. She didn't like how it made her feel.   She is addiment about the trazadone for sleep. MM

## 2018-09-07 ENCOUNTER — HOSPITAL ENCOUNTER (OUTPATIENT)
Dept: MAMMOGRAPHY | Facility: HOSPITAL | Age: 68
Discharge: HOME | End: 2018-09-07
Attending: SPECIALIST | Admitting: SPECIALIST

## 2018-09-07 DIAGNOSIS — Z12.31 VISIT FOR SCREENING MAMMOGRAM: ICD-10-CM

## 2018-09-07 PROCEDURE — 77067 SCR MAMMO BI INCL CAD: CPT

## 2018-09-07 PROCEDURE — 77063 BREAST TOMOSYNTHESIS BI: CPT

## 2018-09-26 RX ORDER — TRAZODONE HYDROCHLORIDE 50 MG/1
TABLET ORAL
Qty: 30 TABLET | Refills: 3 | Status: SHIPPED | OUTPATIENT
Start: 2018-09-26 | End: 2018-11-21

## 2018-10-13 DIAGNOSIS — J30.9 ALLERGIC RHINITIS: ICD-10-CM

## 2018-10-15 RX ORDER — FLUTICASONE PROPIONATE 50 MCG
SPRAY, SUSPENSION (ML) NASAL
Qty: 48 ML | Refills: 2 | Status: SHIPPED | OUTPATIENT
Start: 2018-10-15 | End: 2020-08-05 | Stop reason: SDUPTHER

## 2018-11-21 ENCOUNTER — OFFICE VISIT (OUTPATIENT)
Dept: INTERNAL MEDICINE | Age: 68
End: 2018-11-21

## 2018-11-21 VITALS
DIASTOLIC BLOOD PRESSURE: 80 MMHG | OXYGEN SATURATION: 99 % | HEIGHT: 62 IN | TEMPERATURE: 98 F | WEIGHT: 135.2 LBS | HEART RATE: 72 BPM | SYSTOLIC BLOOD PRESSURE: 148 MMHG | BODY MASS INDEX: 24.88 KG/M2

## 2018-11-21 DIAGNOSIS — E03.8 SUBCLINICAL HYPOTHYROIDISM: ICD-10-CM

## 2018-11-21 DIAGNOSIS — F32.9 REACTIVE DEPRESSION: ICD-10-CM

## 2018-11-21 DIAGNOSIS — Z00.00 ROUTINE HEALTH MAINTENANCE: ICD-10-CM

## 2018-11-21 DIAGNOSIS — I10 HYPERTENSION, ESSENTIAL: Primary | ICD-10-CM

## 2018-11-21 PROCEDURE — 99214 OFFICE O/P EST MOD 30 MIN: CPT | Performed by: INTERNAL MEDICINE

## 2018-11-21 RX ORDER — AMLODIPINE BESYLATE 5 MG/1
10 TABLET ORAL DAILY
COMMUNITY
End: 2019-10-25 | Stop reason: DRUGHIGH

## 2018-11-21 NOTE — PROGRESS NOTES
"[facility] [place] [facility]  WILLIAM PEREZ MD      Sandi V Kristina / 68 y.o. / female  11/21/2018      ASSESSMENT & PLAN:    Problem List Items Addressed This Visit        Unprioritized    Hypertension, essential - Primary    Relevant Medications    amLODIPine (NORVASC) 5 MG tablet    Other Relevant Orders    Lipid Panel    Routine health maintenance    Relevant Orders    CBC & Differential    Subclinical hypothyroidism    Overview     October 2017           Other Visit Diagnoses     Reactive depression            Orders Placed This Encounter   Procedures   • Lipid Panel   • CBC & Differential       Summary/Discussion:    Number-one stage I systolic elevation of hypertension, increase amlodipine to 10 mg per day, blood pressure check back in 4 weeks with [facility].  Check lipids, follow-up results by mail.    #2 subclinical hypothyroidism normal based on laboratory done earlier this spring.    #3 routine health maintenance, check CBC today.    #4 patient still having issues with sadness and depression after recent death.  She is seeing a therapist beginning next week, she declined medication at this time.      Return in about 4 weeks (around 12/19/2018).    ____________________________________________________________________    VITALS    Visit Vitals  /80   Pulse 72   Temp 98 °F (36.7 °C)   Ht 157.5 cm (62.01\")   Wt 61.3 kg (135 lb 3.2 oz)   SpO2 99%   BMI 24.72 kg/m²       BP Readings from Last 3 Encounters:   11/21/18 148/80   07/18/18 140/80   06/06/18 150/80     Wt Readings from Last 3 Encounters:   11/21/18 61.3 kg (135 lb 3.2 oz)   07/18/18 66.1 kg (145 lb 12.8 oz)   06/06/18 66 kg (145 lb 9.6 oz)      Body mass index is 24.72 kg/m².    CC:  Main reason(s) for today's visit: Hypertension      HPI:     Patient presents this morning for follow-up of hypertension.  When last seen by me, blood pressure is well-controlled 140/80.  At that time, there was significant family stress with ongoing illness present.  Since " then, she has compliant with medication,  dietary salt restriction, and home blood pressure monitoring.  Typically blood pressure runs less than 140/70.  He is exercising on a regular basis.  There are no medication side effects noted.    Subclinical hypothyroidism at this time, she denied any change in skin or hair texture, change in bowel habits or presence of tremor.    Laboratory review April 25, 2018 CMP normal thyroid normal lipids adequately controlled    Patient Care Team:  Uri Joaquin MD as PCP - General (Internal Medicine)  Uri Joaquin MD as PCP - Claims Attributed  ____________________________________________________________________    REVIEW OF SYSTEMS    Review of Systems   Constitutional: Negative for appetite change.   Respiratory: Negative for chest tightness and shortness of breath.    Cardiovascular: Negative for chest pain and palpitations.   Neurological: Negative for dizziness, syncope, speech difficulty, weakness, light-headedness and headaches.   Psychiatric/Behavioral: Negative for self-injury, sleep disturbance and suicidal ideas.        Patient will occasionally take melatonin for sleep, but not on a regular daily basis.         PHYSICAL EXAMINATION    Physical Exam   Constitutional: She is oriented to person, place, and time. She appears well-developed and well-nourished. No distress.   Cardiovascular: Normal rate, regular rhythm, normal heart sounds and intact distal pulses. Exam reveals no gallop and no friction rub.   No murmur heard.  Pulmonary/Chest: Effort normal and breath sounds normal. She has no wheezes. She has no rales.   Musculoskeletal: She exhibits no edema.   Neurological: She is alert and oriented to person, place, and time.   Skin: Skin is warm and dry. No rash noted.   Psychiatric: She has a normal mood and affect. Her behavior is normal. Judgment and thought content normal.   Nursing note and vitals reviewed.        REVIEWED DATA:    Labs:     Lab Results    Component Value Date     04/25/2018    K 4.2 04/25/2018    AST 28 04/25/2018    ALT 28 04/25/2018    BUN 12 04/25/2018    CREATININE 0.82 04/25/2018    CREATININE 0.91 10/25/2017    CREATININE 0.88 04/28/2017    EGFRIFNONA 69 04/25/2018    EGFRIFAFRI 84 04/25/2018       No results found for: HGBA1C, GLUCOSE, MICROALBUR    Lab Results   Component Value Date     (H) 04/25/2018     (H) 10/25/2017     (H) 04/28/2017    HDL 56 04/25/2018    TRIG 127 04/25/2018       Lab Results   Component Value Date    TSH 3.220 04/25/2018    FREET4 1.22 04/25/2018       Lab Results   Component Value Date    WBC 6.14 10/25/2017    HGB 14.4 10/25/2017    HGB 14.5 05/23/2016     10/25/2017       No results found for: PSA      Imaging:         Medical Tests:         Summary of old records / correspondence / consultant report:         Request outside records:         ALLERGIES  Allergies   Allergen Reactions   • Benazepril Cough        MEDICATIONS  Current Outpatient Medications   Medication Sig Dispense Refill   • amLODIPine (NORVASC) 5 MG tablet Take 10 mg by mouth Daily.     • ASPIRIN 81 PO Take 1 tablet by mouth Daily.     • calcium carbonate (OS-ROCIO) 600 MG tablet Take 600 mg by mouth daily.     • cholecalciferol (VITAMIN D3) 1000 UNITS tablet Take 1,000 Units by mouth daily.     • Flaxseed, Linseed, (FLAXSEED OIL PO) Take  by mouth.     • fluticasone (FLONASE) 50 MCG/ACT nasal spray INSTILL 2 SPRAYS INTO EACH NOSTRIL DAILY. 48 mL 2   • Multiple Vitamins-Minerals ([facility]S SPECTRAVITE ADULT 50+ PO) Take  by mouth.     • Probiotic Product (PROBIOTIC DAILY PO) Take  by mouth.       No current facility-administered medications for this visit.        PFSH:     The following portions of the patient's history were reviewed and updated as appropriate: Allergies / Current Medications / Past Medical History / Surgical History / Social History / Family History    PROBLEM LIST   Patient Active Problem List    Diagnosis   • Hypertension, essential   • Dupuytren's contracture of hand (Keo Bauer)   • Glaucoma associated with ocular disorder (Conrd)   • Low back pain   • Carotid stenosis mild ( 2/2012)   • Blepharitis of both eyes   • Lichen sclerosus (GYN = isabell Garza)   • Agoraphobia   • Urinary incontinence in female   • Squamous cell carcinoma   • Osteopenia   • FH: uterine cancer   • FH: CAD (coronary artery disease)   • Vitamin D deficiency disease   • Postmenopausal   • Panic anxiety syndrome (Xanax)   • Hayfever   • Osteoarthritis   • Routine health maintenance   • Cough due to ACE inhibitor   • COPD (chronic obstructive pulmonary disease) (CMS/[facility])   • Pure hypercholesterolemia   • Palpitations   • Hyperlipidemia   • Subclinical hypothyroidism   • Plantar fasciitis of left foot       PAST MEDICAL HISTORY  Past Medical History:   Diagnosis Date   • Allergic    • Anxiety    • Arthritis    • Cataract    • COPD (chronic obstructive pulmonary disease) (CMS/[facility])     Mild obstructive defect noted 2017   • Emphysema of lung (CMS/[facility])    • Hypertension    • Low back pain    • Osteopenia    • Plantar fasciitis of left foot 07/2018   • Urinary tract infection    • Visual impairment        SURGICAL HISTORY  Past Surgical History:   Procedure Laterality Date   • CYST REMOVAL Right 12/23/2015    Neck- Dorota Bauer   • EYE SURGERY Left 01/01/1955    to repair lazy eye   • LAPAROSCOPIC TUBAL LIGATION Bilateral 1986   • TUBAL ABDOMINAL LIGATION         SOCIAL HISTORY  Social History     Socioeconomic History   • Marital status:      Spouse name: none   • Number of children: 2   • Years of education: 12    • Highest education level: Not on file   Occupational History   • 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[employer]     Employer: [employer]   Tobacco Use   • Smoking status: Never Smoker   • Smokeless tobacco: Never Used   Substance and Sexual Activity   • Alcohol use: No   • Drug use: No   • Sexual  activity: No     Partners: Male   Social History Narrative    Hobby water aerobics, yard work       FAMILY HISTORY  Family History   Problem Relation Age of Onset   • Arthritis Mother            • Cancer Mother         Esophagus   • Glaucoma Mother    • Hypertension Mother            • Stroke Mother            • Osteoporosis Mother    • Ovarian cancer Mother    • Heart disease Mother         Congestive heart   • Heart disease Father    • Stroke Father            • Early death Father         Ceraboral hemmorage   • Hypertension Father            • Thyroid disease Sister    • Hyperlipidemia Brother    • Hypertension Brother    • Asthma Daughter    • Heart disease Maternal Aunt    • Asthma Daughter         She is on meds   • Heart disease Maternal Aunt         Congestive heart   • Heart disease Paternal Aunt         Stroke   • Hyperlipidemia Brother         On meds   • Hypertension Brother    • Stroke Maternal Aunt         On meds   • Thyroid disease Sister         On meds       Health Maintenance Due   Topic   • MEDICARE ANNUAL WELLNESS    • COLONOSCOPY    • ZOSTER VACCINE (2 of 2)       Overdue health maintenance interventions  reviewed with patient.    Dictated utilizing Dragon voice recognition software

## 2018-12-19 ENCOUNTER — CLINICAL SUPPORT (OUTPATIENT)
Dept: INTERNAL MEDICINE | Age: 68
End: 2018-12-19

## 2018-12-19 VITALS — DIASTOLIC BLOOD PRESSURE: 76 MMHG | HEART RATE: 70 BPM | SYSTOLIC BLOOD PRESSURE: 116 MMHG

## 2018-12-19 LAB
BASOPHILS # BLD AUTO: 0.04 10*3/MM3 (ref 0–0.2)
BASOPHILS NFR BLD AUTO: 0.5 % (ref 0–1.5)
CHOLEST SERPL-MCNC: 216 MG/DL (ref 0–200)
EOSINOPHIL # BLD AUTO: 0.11 10*3/MM3 (ref 0–0.7)
EOSINOPHIL NFR BLD AUTO: 1.5 % (ref 0.3–6.2)
ERYTHROCYTE [DISTWIDTH] IN BLOOD BY AUTOMATED COUNT: 13.6 % (ref 11.7–13)
HCT VFR BLD AUTO: 43.1 % (ref 35.6–45.5)
HDLC SERPL-MCNC: 60 MG/DL (ref 40–60)
HGB BLD-MCNC: 14.3 G/DL (ref 11.9–15.5)
IMM GRANULOCYTES # BLD: 0.01 10*3/MM3 (ref 0–0.03)
IMM GRANULOCYTES NFR BLD: 0.1 % (ref 0–0.5)
LDLC SERPL CALC-MCNC: 134 MG/DL (ref 0–100)
LYMPHOCYTES # BLD AUTO: 2.26 10*3/MM3 (ref 0.9–4.8)
LYMPHOCYTES NFR BLD AUTO: 30.4 % (ref 19.6–45.3)
MCH RBC QN AUTO: 30.3 PG (ref 26.9–32)
MCHC RBC AUTO-ENTMCNC: 33.2 G/DL (ref 32.4–36.3)
MCV RBC AUTO: 91.3 FL (ref 80.5–98.2)
MONOCYTES # BLD AUTO: 0.64 10*3/MM3 (ref 0.2–1.2)
MONOCYTES NFR BLD AUTO: 8.6 % (ref 5–12)
NEUTROPHILS # BLD AUTO: 4.39 10*3/MM3 (ref 1.9–8.1)
NEUTROPHILS NFR BLD AUTO: 59 % (ref 42.7–76)
PLATELET # BLD AUTO: 323 10*3/MM3 (ref 140–500)
RBC # BLD AUTO: 4.72 10*6/MM3 (ref 3.9–5.2)
TRIGL SERPL-MCNC: 111 MG/DL (ref 0–150)
VLDLC SERPL CALC-MCNC: 22.2 MG/DL (ref 5–40)
WBC # BLD AUTO: 7.44 10*3/MM3 (ref 4.5–10.7)

## 2018-12-19 NOTE — PROGRESS NOTES
Patient was asked to recheck blood pressure after increasing amlodipine to 10 mg.  She presents to the office today, blood pressure noted to be 116/76.  Impression hypertension stable on current meds.  Plan: Continue meds, blood pressure check 4 months.

## 2018-12-19 NOTE — PROGRESS NOTES
Pt was contacted and made aware of need to keep same med and F/U in 4 months. Pt verbalized understanding. Pt is making appt for Dr. Clark as this note is made. MM

## 2019-05-03 ENCOUNTER — OFFICE VISIT (OUTPATIENT)
Dept: INTERNAL MEDICINE | Age: 69
End: 2019-05-03

## 2019-05-03 VITALS
BODY MASS INDEX: 24.77 KG/M2 | HEART RATE: 63 BPM | OXYGEN SATURATION: 98 % | WEIGHT: 134.6 LBS | TEMPERATURE: 97.4 F | SYSTOLIC BLOOD PRESSURE: 130 MMHG | HEIGHT: 62 IN | DIASTOLIC BLOOD PRESSURE: 70 MMHG | RESPIRATION RATE: 13 BRPM

## 2019-05-03 DIAGNOSIS — I10 HYPERTENSION, ESSENTIAL: Primary | ICD-10-CM

## 2019-05-03 DIAGNOSIS — E78.5 HYPERLIPIDEMIA, UNSPECIFIED HYPERLIPIDEMIA TYPE: ICD-10-CM

## 2019-05-03 DIAGNOSIS — E55.9 VITAMIN D DEFICIENCY DISEASE: ICD-10-CM

## 2019-05-03 DIAGNOSIS — Z76.89 ESTABLISHING CARE WITH NEW DOCTOR, ENCOUNTER FOR: ICD-10-CM

## 2019-05-03 LAB
25(OH)D3+25(OH)D2 SERPL-MCNC: 47.1 NG/ML (ref 30–100)
ALBUMIN SERPL-MCNC: 4.5 G/DL (ref 3.5–5.2)
ALBUMIN/GLOB SERPL: 1.8 G/DL
ALP SERPL-CCNC: 94 U/L (ref 39–117)
ALT SERPL-CCNC: 17 U/L (ref 1–33)
AST SERPL-CCNC: 23 U/L (ref 1–32)
BILIRUB SERPL-MCNC: 0.6 MG/DL (ref 0.2–1.2)
BUN SERPL-MCNC: 12 MG/DL (ref 8–23)
BUN/CREAT SERPL: 16 (ref 7–25)
CALCIUM SERPL-MCNC: 9.9 MG/DL (ref 8.6–10.5)
CHLORIDE SERPL-SCNC: 104 MMOL/L (ref 98–107)
CHOLEST SERPL-MCNC: 203 MG/DL (ref 0–200)
CO2 SERPL-SCNC: 25.5 MMOL/L (ref 22–29)
CREAT SERPL-MCNC: 0.75 MG/DL (ref 0.57–1)
GLOBULIN SER CALC-MCNC: 2.5 GM/DL
GLUCOSE SERPL-MCNC: 103 MG/DL (ref 65–99)
HDLC SERPL-MCNC: 59 MG/DL (ref 40–60)
LDLC SERPL CALC-MCNC: 120 MG/DL (ref 0–100)
POTASSIUM SERPL-SCNC: 4.5 MMOL/L (ref 3.5–5.2)
PROT SERPL-MCNC: 7 G/DL (ref 6–8.5)
SODIUM SERPL-SCNC: 141 MMOL/L (ref 136–145)
T4 FREE SERPL-MCNC: 1.09 NG/DL (ref 0.93–1.7)
TRIGL SERPL-MCNC: 121 MG/DL (ref 0–150)
TSH SERPL DL<=0.005 MIU/L-ACNC: 3.77 MIU/ML (ref 0.27–4.2)
VLDLC SERPL CALC-MCNC: 24.2 MG/DL

## 2019-05-03 PROCEDURE — 99214 OFFICE O/P EST MOD 30 MIN: CPT | Performed by: INTERNAL MEDICINE

## 2019-05-03 NOTE — PROGRESS NOTES
Sandi Acevedo is a 68 y.o. female who presents with   Chief Complaint   Patient presents with   • Hypertension   • Hyperlipidemia   • Vitamin D Deficiency   • Establishing care with new physician     Former patient of Dr. Joaquin   .    68-year-old female.  Former patient of Dr. Joaquin.  6-month checkup/lab update visit.  No complaints or problems on today's visit.  6 months ago she lost her  she said.  She currently is seeing a psychologist- Елена Kelley.  Gynecology care is being rendered by Dr. Ruthy Naidu.  And she sees Dr. Topher Tapia every year for bilateral carotid Doppler follow-ups for mild bilateral (less than 50% bilaterally) carotid stenosis.  Also on an annual basis she sees Dr.Stephen JAE Bauer-dermatology and  apparently had some skin cancers removed.      Hypertension   This is a chronic problem. The current episode started more than 1 year ago. The problem is controlled. Current antihypertension treatment includes calcium channel blockers. The current treatment provides significant improvement. There are no compliance problems.    Hyperlipidemia   This is a chronic problem. The current episode started more than 1 year ago. The problem is controlled. Recent lipid tests were reviewed and are normal. Current antihyperlipidemic treatment includes diet change. The current treatment provides moderate improvement of lipids.        The following portions of the patient's history were reviewed and updated as appropriate: allergies, current medications, past medical history and problem list.    Review of Systems   Constitutional: Negative.    HENT: Negative.    Eyes: Negative.    Respiratory: Negative.    Cardiovascular: Negative.    Genitourinary: Negative.    Musculoskeletal: Negative.    Skin: Negative.    Neurological: Negative.    Psychiatric/Behavioral: Negative.        Objective   Physical Exam   Constitutional: She is oriented to person, place, and time. She appears well-developed and  well-nourished. No distress.   HENT:   Head: Normocephalic and atraumatic.   Eyes: Conjunctivae and EOM are normal. Pupils are equal, round, and reactive to light.   Neck: Normal range of motion. Neck supple. No thyromegaly present.   Neck exam negative.  Carotid auscultation normal-no bruits heard.   Cardiovascular: Normal rate, regular rhythm, normal heart sounds and intact distal pulses. Exam reveals no gallop and no friction rub.   No murmur heard.  Pulmonary/Chest: Effort normal and breath sounds normal. No respiratory distress. She has no wheezes. She has no rales. She exhibits no tenderness.   Neurological: She is alert and oriented to person, place, and time.   Psychiatric: She has a normal mood and affect. Her behavior is normal. Judgment and thought content normal.   Nursing note and vitals reviewed.      Assessment/Plan   Sandi was seen today for hypertension, hyperlipidemia, vitamin d deficiency and establishing care with new physician.    Diagnoses and all orders for this visit:    Hypertension, essential  -     Comprehensive Metabolic Panel  -     TSH+Free T4    Hyperlipidemia, unspecified hyperlipidemia type  -     Comprehensive Metabolic Panel  -     Lipid Panel  -     TSH+Free T4    Vitamin D deficiency disease  -     Vitamin D 25 Hydroxy    Establishing care with new doctor, encounter for      Plan: Labs as above.Today's exam unremarkable for any new problems or events.  Continue all current treatment as prescribed.  A follow-up checkup/lab update visit is advised for 6 months assuming today's labs are all acceptable.    She declines to participate in the Medicare wellness visit program    The patient has not had a screening colonoscopy.  The patient refuses to be referred to gastroenterology and to get a colonoscopy scheduled for the near future.  A COLOGUARD test was also declined as was the newer blood test- Epi pro Colon Septin 9 gene blood test.  The patient is aware of the risks of undiagnosed  colon cancer including GI bleed, bowel obstruction, bowel perforation, metastatic colon cancer and death.  The patient voices an understanding of these risks but also voices a willingness to accept those risks based on the current decision to decline a colonoscopy.

## 2019-05-06 NOTE — PROGRESS NOTES
With minor variations all labs are within stable and acceptable ranges without any significant changes from the last several lab tests. Continue all meds as they are. A followup visit with fasting lab updates is advised for 6 months.

## 2019-05-20 DIAGNOSIS — I10 HYPERTENSION, ESSENTIAL: ICD-10-CM

## 2019-05-20 RX ORDER — AMLODIPINE BESYLATE 2.5 MG/1
2.5 TABLET ORAL DAILY
Qty: 90 TABLET | Refills: 3 | Status: SHIPPED | OUTPATIENT
Start: 2019-05-20 | End: 2019-10-25 | Stop reason: DRUGHIGH

## 2019-10-22 ENCOUNTER — TRANSCRIBE ORDERS (OUTPATIENT)
Dept: ADMINISTRATIVE | Facility: HOSPITAL | Age: 69
End: 2019-10-22

## 2019-10-22 DIAGNOSIS — Z78.0 MENOPAUSE: ICD-10-CM

## 2019-10-22 DIAGNOSIS — Z12.31 VISIT FOR SCREENING MAMMOGRAM: Primary | ICD-10-CM

## 2019-10-25 ENCOUNTER — TELEPHONE (OUTPATIENT)
Dept: INTERNAL MEDICINE | Age: 69
End: 2019-10-25

## 2019-10-25 RX ORDER — AMLODIPINE BESYLATE 5 MG/1
7.5 TABLET ORAL DAILY
Qty: 90 TABLET | Refills: 1 | Status: SHIPPED | OUTPATIENT
Start: 2019-10-25 | End: 2019-11-06 | Stop reason: SDUPTHER

## 2019-10-25 NOTE — TELEPHONE ENCOUNTER
Pt is on 7.5 mg Norvasc. This is continuation from Dr. Joaquin. There is documentation of a 10 mg tab, 5 mg, and 2.5mg. All have been used to adjust to a 7.5mg.   Refill will be done as requested. NENA

## 2019-10-25 NOTE — TELEPHONE ENCOUNTER
Please assist. Pt states she has been requesting 5mg [facility] from [facility], however we do not have a request for this. Pt was prescribed 2.5mg on 05.03.19 from Dr. Clark with the 5mg showing as a historical med.   Pt states Dr. Clark had her on 7.5mg of Amlodipine and she is till on this dosage. It has not been changed. However I see no documentation in her chart stating she needs to be on 7.5mg.     Please review thank you

## 2019-10-25 NOTE — TELEPHONE ENCOUNTER
Pt called and needs refill on amlodipine 2.5mg and wants 180 day supply sent to [facility]#[phone]

## 2019-11-06 ENCOUNTER — OFFICE VISIT (OUTPATIENT)
Dept: INTERNAL MEDICINE | Age: 69
End: 2019-11-06

## 2019-11-06 VITALS
BODY MASS INDEX: 25.1 KG/M2 | TEMPERATURE: 97.7 F | RESPIRATION RATE: 13 BRPM | OXYGEN SATURATION: 98 % | HEIGHT: 62 IN | HEART RATE: 75 BPM | DIASTOLIC BLOOD PRESSURE: 70 MMHG | WEIGHT: 136.4 LBS | SYSTOLIC BLOOD PRESSURE: 132 MMHG

## 2019-11-06 DIAGNOSIS — Z12.11 ENCOUNTER FOR SCREENING FOR MALIGNANT NEOPLASM OF COLON: ICD-10-CM

## 2019-11-06 DIAGNOSIS — E78.00 PURE HYPERCHOLESTEROLEMIA: ICD-10-CM

## 2019-11-06 DIAGNOSIS — I10 HYPERTENSION, ESSENTIAL: Primary | ICD-10-CM

## 2019-11-06 DIAGNOSIS — Z00.00 HEALTHCARE MAINTENANCE: ICD-10-CM

## 2019-11-06 PROCEDURE — 99214 OFFICE O/P EST MOD 30 MIN: CPT | Performed by: INTERNAL MEDICINE

## 2019-11-06 RX ORDER — AMLODIPINE BESYLATE 5 MG/1
7.5 TABLET ORAL DAILY
Qty: 135 TABLET | Refills: 1 | Status: SHIPPED | OUTPATIENT
Start: 2019-11-06 | End: 2020-06-03

## 2019-11-06 NOTE — PROGRESS NOTES
"Sandi Acevedo is a 69 y.o. female who presents with   Chief Complaint   Patient presents with   • Hypertension     Amlodipine 5 mg 1-1/2 tablets daily   • Hyperlipidemia     No prescription medication   .    69-year-old female.  6-month checkup/lab update visit.  No complaints or problems on today's visit.      Hypertension   This is a chronic problem. The current episode started more than 1 year ago. The problem is unchanged. The problem is controlled. Associated symptoms include anxiety. There are no associated agents to hypertension. Risk factors for coronary artery disease include family history. Current antihypertension treatment includes calcium channel blockers. There are no compliance problems.    Hyperlipidemia   This is a chronic problem. The current episode started more than 1 year ago. The problem is controlled. Recent lipid tests were reviewed and are high. She is currently on no antihyperlipidemic treatment.        /70   Pulse 75   Temp 97.7 °F (36.5 °C)   Resp 13   Ht 157.5 cm (62.01\")   Wt 61.9 kg (136 lb 6.4 oz)   SpO2 98%   BMI 24.94 kg/m²     The following portions of the patient's history were reviewed and updated as appropriate: allergies, current medications, past medical history and problem list.    Review of Systems   Constitutional: Negative.    HENT: Negative.    Eyes: Negative.    Respiratory: Negative.    Cardiovascular: Negative.    Genitourinary: Negative.    Musculoskeletal: Negative.    Skin: Negative.    Neurological: Negative.    Psychiatric/Behavioral: Negative.        Objective   Physical Exam   Constitutional: She is oriented to person, place, and time. She appears well-developed and well-nourished. No distress.   HENT:   Head: Normocephalic and atraumatic.   Eyes: Conjunctivae and EOM are normal. Pupils are equal, round, and reactive to light.   Neck: Normal range of motion. Neck supple. No thyromegaly present.   Neck exam negative.  Carotid auscultation normal-no " bruits heard.   Cardiovascular: Normal rate, regular rhythm, normal heart sounds and intact distal pulses. Exam reveals no gallop and no friction rub.   No murmur heard.  Pulmonary/Chest: Effort normal and breath sounds normal. No respiratory distress. She has no wheezes. She has no rales. She exhibits no tenderness.   Neurological: She is alert and oriented to person, place, and time.   Psychiatric: She has a normal mood and affect. Her behavior is normal. Judgment and thought content normal.   Nursing note and vitals reviewed.      Assessment/Plan   Sandi was seen today for hypertension and hyperlipidemia.    Diagnoses and all orders for this visit:    Hypertension, essential  -     Comprehensive Metabolic Panel  -     Urinalysis With Culture If Indicated -    Pure hypercholesterolemia  -     Comprehensive Metabolic Panel  -     Lipid Panel  -     Urinalysis With Culture If Indicated -    Healthcare maintenance  -     Cologuard - Stool, Per Rectum    Encounter for screening for malignant neoplasm of colon   -     Cologuard - Stool, Per Rectum    Other orders  -     amLODIPine (NORVASC) 5 MG tablet; Take 1.5 tablets by mouth Daily.      Plan: Labs as above for the issues as outlined.  Continue current treatment as prescribed.  Follow-up checkup/lab update visit-6 months.    Medicare wellness visit advised-declined.    Flu shot advised-declined    Colonoscopy advised-declined.  Is agreeable to doing Cologuard testing.  Patient is willing to assume the risks of her decision with the realization that Cologuard testing is not diagnostic of colon cancer

## 2019-11-08 ENCOUNTER — DOCUMENTATION (OUTPATIENT)
Dept: INTERNAL MEDICINE | Age: 69
End: 2019-11-08

## 2019-11-08 LAB
ALBUMIN SERPL-MCNC: 4.8 G/DL (ref 3.5–5.2)
ALBUMIN/GLOB SERPL: 2.1 G/DL
ALP SERPL-CCNC: 108 U/L (ref 39–117)
ALT SERPL-CCNC: 20 U/L (ref 1–33)
APPEARANCE UR: ABNORMAL
AST SERPL-CCNC: 28 U/L (ref 1–32)
BACTERIA #/AREA URNS HPF: ABNORMAL /HPF
BACTERIA UR CULT: NORMAL
BACTERIA UR CULT: NORMAL
BILIRUB SERPL-MCNC: 0.8 MG/DL (ref 0.2–1.2)
BILIRUB UR QL STRIP: NEGATIVE
BUN SERPL-MCNC: 14 MG/DL (ref 8–23)
BUN/CREAT SERPL: 17.1 (ref 7–25)
CALCIUM SERPL-MCNC: 9.7 MG/DL (ref 8.6–10.5)
CHLORIDE SERPL-SCNC: 101 MMOL/L (ref 98–107)
CHOLEST SERPL-MCNC: 222 MG/DL (ref 0–200)
CO2 SERPL-SCNC: 26.6 MMOL/L (ref 22–29)
COLOR UR: YELLOW
CREAT SERPL-MCNC: 0.82 MG/DL (ref 0.57–1)
CRYSTALS URNS MICRO: ABNORMAL
EPI CELLS #/AREA URNS HPF: ABNORMAL /HPF
GLOBULIN SER CALC-MCNC: 2.3 GM/DL
GLUCOSE SERPL-MCNC: 95 MG/DL (ref 65–99)
GLUCOSE UR QL: NEGATIVE
HDLC SERPL-MCNC: 62 MG/DL (ref 40–60)
HGB UR QL STRIP: NEGATIVE
KETONES UR QL STRIP: NEGATIVE
LDLC SERPL CALC-MCNC: 144 MG/DL (ref 0–100)
LEUKOCYTE ESTERASE UR QL STRIP: ABNORMAL
MICRO URNS: ABNORMAL
MUCOUS THREADS URNS QL MICRO: PRESENT /HPF
NITRITE UR QL STRIP: NEGATIVE
PH UR STRIP: 5.5 [PH] (ref 5–7.5)
POTASSIUM SERPL-SCNC: 4.2 MMOL/L (ref 3.5–5.2)
PROT SERPL-MCNC: 7.1 G/DL (ref 6–8.5)
PROT UR QL STRIP: NEGATIVE
RBC #/AREA URNS HPF: ABNORMAL /HPF
SODIUM SERPL-SCNC: 142 MMOL/L (ref 136–145)
SP GR UR: 1.02 (ref 1–1.03)
TRIGL SERPL-MCNC: 80 MG/DL (ref 0–150)
UNIDENT CRYS URNS QL MICRO: PRESENT /LPF
URINALYSIS REFLEX: ABNORMAL
UROBILINOGEN UR STRIP-MCNC: 0.2 MG/DL (ref 0.2–1)
VLDLC SERPL CALC-MCNC: 16 MG/DL
WBC #/AREA URNS HPF: ABNORMAL /HPF

## 2019-11-08 NOTE — PROGRESS NOTES
"Comprehensive metabolic panel shows normal glucose at 95.  Liver tests and kidney tests also are normal.  There are persisting elevations of cholesterol at 222 and \"bad cholesterol\" (LDL) at 144.  Statistically with these numbers as they are given your age and the fact that you are currently under treatment for blood pressure, the estimated 10-year statistical risk of a cardiovascular event is approximately 15%.  When the risk is at 7% or above prescription treatment is indicated.  I will need to see you in 4 months for a office checkup and follow-up lab testing to make sure these numbers are more acceptable.  Urinalysis is normal and the rest of the labs likewise are normal.  Continue all other treatment as prescribed."

## 2019-11-08 NOTE — PROGRESS NOTES
November 8, 2019: Patient's elevated lipids were noted and she was notified of such and also was notified of the associated cardiovascular risk factors.  The medical recommendation was to begin statin therapy as advised by the American Heart Association under such circumstances.  The patient refused medical advice and is willing to assume the consequences of her action.

## 2019-11-08 NOTE — PROGRESS NOTES
Pt notified of results. Pt verbalized she will NOT take simvastatin. She will increase exercise and watch diet closer.   Rx was not sent to pharm as ordered r/t pt decline to take med.MM

## 2019-12-04 ENCOUNTER — HOSPITAL ENCOUNTER (OUTPATIENT)
Dept: BONE DENSITY | Facility: HOSPITAL | Age: 69
Discharge: HOME | End: 2019-12-04
Admitting: SPECIALIST

## 2019-12-04 ENCOUNTER — HOSPITAL ENCOUNTER (OUTPATIENT)
Dept: MAMMOGRAPHY | Facility: HOSPITAL | Age: 69
Discharge: HOME | End: 2019-12-04

## 2019-12-04 DIAGNOSIS — Z78.0 MENOPAUSE: ICD-10-CM

## 2019-12-04 DIAGNOSIS — Z12.31 VISIT FOR SCREENING MAMMOGRAM: ICD-10-CM

## 2019-12-04 PROCEDURE — 77067 SCR MAMMO BI INCL CAD: CPT

## 2019-12-04 PROCEDURE — 77063 BREAST TOMOSYNTHESIS BI: CPT

## 2019-12-04 PROCEDURE — 77080 DXA BONE DENSITY AXIAL: CPT

## 2019-12-31 RX ORDER — LORAZEPAM 0.5 MG/1
TABLET ORAL
Qty: 30 TABLET | OUTPATIENT
Start: 2019-12-31

## 2020-01-02 RX ORDER — LORAZEPAM 0.5 MG/1
TABLET ORAL
Qty: 30 TABLET | OUTPATIENT
Start: 2020-01-02

## 2020-01-02 NOTE — TELEPHONE ENCOUNTER
LOV r/t anxiety  7/18/2018. Pt then sent message on 8/30/2018 stating she wanted trazodone, which was given. On 11/21/2018 she began seeing therapy and both meds were D/Cd.    NOV  5/6/2020  CONTRACT NOT DONE  UDS  NOT DONE  [facility]  On desk

## 2020-01-02 NOTE — TELEPHONE ENCOUNTER
"Request denied.  I do not see that I have ever prescribed this medication.  She does not have a signed drug contract or a urine drug screening test on file through our office.  Also notation indicates that \"she began seeing therapy on both medications- trazodone/lorazepam- were discontinued\".  "

## 2020-03-30 ENCOUNTER — TELEPHONE (OUTPATIENT)
Dept: INTERNAL MEDICINE | Age: 70
End: 2020-03-30

## 2020-03-30 NOTE — TELEPHONE ENCOUNTER
----- Message from Sandi Acevedo sent at 3/28/2020  5:01 PM EDT -----  Regarding: Non-Urgent Medical Question  Contact: [phone]  Hi   Dr. Joaquin diagnosed me with COPD several months ago. I work at the [employer]. 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 Please send me a letter stating my condition.  Thanks for all of you and your staff at this difficult time.  Ekta Acevedo DO B 1950

## 2020-04-06 ENCOUNTER — TELEPHONE (OUTPATIENT)
Dept: INTERNAL MEDICINE | Age: 70
End: 2020-04-06

## 2020-04-06 NOTE — TELEPHONE ENCOUNTER
Go ahead and write a letter as she requests with the wording as requested but I will not be able to sign it until next week

## 2020-04-06 NOTE — TELEPHONE ENCOUNTER
PT STATED THAT THE LETTER THAT  WROTE, NEED TO ALSO SAY THAT IT WOULD NOT BE ADVISABLE FOR PT TO WORK DURING THE COVID-19 OUTBREAK BECAUSE OF HER SHORTNESS OF BREATH AT TIME. PT REQUESTED TO HAVE THE LETTER MAILED.    PLEASE ADVISE

## 2020-04-07 ENCOUNTER — TELEPHONE (OUTPATIENT)
Dept: INTERNAL MEDICINE | Age: 70
End: 2020-04-07

## 2020-04-07 NOTE — TELEPHONE ENCOUNTER
----- Message from Sandi Acevedo sent at 2020  8:24 AM EDT -----  Regarding: Non-Urgent Medical Question  Contact: [phone] hi   In response to my letter you mailed me. Please add to the letter that it would be in Sandi Acevedo’s best interest that she would not work at this time during the COVID 19 outbreaks due to her underlying condition. I am employed at [employer].  Thanks   Sandi Acevedo   1950

## 2020-06-03 DIAGNOSIS — I10 HYPERTENSION, ESSENTIAL: Primary | ICD-10-CM

## 2020-06-03 RX ORDER — AMLODIPINE BESYLATE 5 MG/1
TABLET ORAL
Qty: 135 TABLET | Refills: 1 | Status: SHIPPED | OUTPATIENT
Start: 2020-06-03 | End: 2021-06-11

## 2020-06-08 DIAGNOSIS — I10 HYPERTENSION, ESSENTIAL: ICD-10-CM

## 2020-06-08 RX ORDER — AMLODIPINE BESYLATE 2.5 MG/1
TABLET ORAL
Qty: 90 TABLET | Refills: 3 | Status: SHIPPED | OUTPATIENT
Start: 2020-06-08 | End: 2021-06-11

## 2020-07-06 ENCOUNTER — OFFICE VISIT (OUTPATIENT)
Dept: INTERNAL MEDICINE | Age: 70
End: 2020-07-06

## 2020-07-06 VITALS
HEIGHT: 62 IN | WEIGHT: 142 LBS | HEART RATE: 70 BPM | DIASTOLIC BLOOD PRESSURE: 70 MMHG | RESPIRATION RATE: 13 BRPM | OXYGEN SATURATION: 97 % | BODY MASS INDEX: 26.13 KG/M2 | SYSTOLIC BLOOD PRESSURE: 130 MMHG | TEMPERATURE: 97.4 F

## 2020-07-06 DIAGNOSIS — L23.7 ALLERGIC CONTACT DERMATITIS DUE TO PLANTS, EXCEPT FOOD: Primary | ICD-10-CM

## 2020-07-06 PROCEDURE — 96372 THER/PROPH/DIAG INJ SC/IM: CPT | Performed by: INTERNAL MEDICINE

## 2020-07-06 PROCEDURE — 99214 OFFICE O/P EST MOD 30 MIN: CPT | Performed by: INTERNAL MEDICINE

## 2020-07-06 RX ORDER — METHYLPREDNISOLONE ACETATE 80 MG/ML
80 INJECTION, SUSPENSION INTRA-ARTICULAR; INTRALESIONAL; INTRAMUSCULAR; SOFT TISSUE ONCE
Status: COMPLETED | OUTPATIENT
Start: 2020-07-06 | End: 2020-07-06

## 2020-07-06 RX ADMIN — METHYLPREDNISOLONE ACETATE 80 MG: 80 INJECTION, SUSPENSION INTRA-ARTICULAR; INTRALESIONAL; INTRAMUSCULAR; SOFT TISSUE at 09:15

## 2020-07-06 NOTE — PROGRESS NOTES
"Sandi Acevedo is a 69 y.o. female who presents with   Chief Complaint   Patient presents with   • Rash on face-3 days     Exposure to poison oak or poison sumac she says 3 days ago.  Now with left facial rash and some involvement under the chin and also on the right mandible and on her right forearm as well.   .    69-year-old female.  History as above regarding facial rash.  Rash is pruritic.       /70   Pulse 70   Temp 97.4 °F (36.3 °C)   Resp 13   Ht 157.5 cm (62.01\")   Wt 64.4 kg (142 lb)   SpO2 97%   BMI 25.97 kg/m²     The following portions of the patient's history were reviewed and updated as appropriate: allergies, current medications, past medical history and problem list.    Review of Systems   Constitutional: Negative.    HENT: Negative.    Eyes: Negative.    Respiratory: Negative.    Cardiovascular: Negative.    Genitourinary: Negative.    Musculoskeletal: Negative.    Skin: Positive for rash.   Neurological: Negative.    Psychiatric/Behavioral: Negative.        Objective   Physical Exam   Constitutional: She is oriented to person, place, and time. She appears well-developed and well-nourished. No distress.   HENT:   Head: Normocephalic and atraumatic.   Eyes: Pupils are equal, round, and reactive to light. Conjunctivae and EOM are normal.   Neck: Normal range of motion. Neck supple. No thyromegaly present.   Neck exam negative.  Carotid auscultation normal-no bruits heard.   Cardiovascular: Normal rate, regular rhythm, normal heart sounds and intact distal pulses. Exam reveals no gallop and no friction rub.   No murmur heard.  Pulmonary/Chest: Effort normal and breath sounds normal. No respiratory distress. She has no wheezes. She has no rales. She exhibits no tenderness.   Neurological: She is alert and oriented to person, place, and time.   Skin:   Patient with atypical appearing contact dermatitis rash primarily involving the left facial area but also involving under her chin and some " involvement of the right mandibular area as well.  The areas are blistered and confluent.  There does not appear to be any superficial infection.   Psychiatric: She has a normal mood and affect. Her behavior is normal. Judgment and thought content normal.   Nursing note and vitals reviewed.      Assessment/Plan   Sandi was seen today for rash on face-3 days.    Diagnoses and all orders for this visit:    Allergic contact dermatitis due to plants, except food  -     methylPREDNISolone acetate (DEPO-medrol) injection 80 mg      Plan: The bilateral nature of her rash points away from herpes zoster and more towards a contact dermatitis.    She will try OTC Benadryl 25 mg capsules 3 times daily as needed for itching.  She was cautioned regarding drowsiness with Benadryl.    She will be given 80 mg Depo-Medrol IM on today's visit.    Follow-up for today's issues-PRN

## 2020-07-13 ENCOUNTER — TELEPHONE (OUTPATIENT)
Dept: INTERNAL MEDICINE | Age: 70
End: 2020-07-13

## 2020-07-13 RX ORDER — METHYLPREDNISOLONE 4 MG/1
TABLET ORAL
Qty: 1 EACH | Refills: 0 | Status: SHIPPED | OUTPATIENT
Start: 2020-07-13 | End: 2020-08-05

## 2020-07-13 NOTE — TELEPHONE ENCOUNTER
PT CALLED REQUESTING A MEDICATION BE CALLED IN FOR HER. PT SENT A [employer] MESSAGE YESTERDAY REGARDING THE ISSUE. PT WOULD NOT GIVE ME MUCH DETAIL. WANTS A CALL BACK TODAY     PLEASE ADVISE     PT CALL BACK   [phone]

## 2020-08-05 DIAGNOSIS — J30.9 ALLERGIC RHINITIS: ICD-10-CM

## 2020-08-05 RX ORDER — FLUTICASONE PROPIONATE 50 MCG
2 SPRAY, SUSPENSION (ML) NASAL DAILY
Qty: 48 ML | Refills: 2 | Status: SHIPPED | OUTPATIENT
Start: 2020-08-05 | End: 2022-07-01 | Stop reason: SDUPTHER

## 2020-09-25 ENCOUNTER — TELEPHONE (OUTPATIENT)
Dept: INTERNAL MEDICINE | Age: 70
End: 2020-09-25

## 2020-09-25 NOTE — TELEPHONE ENCOUNTER
Patient called and needs to know if she should get scheduled for her blood work that was cancelled in May due to pandemic. She was at an appt in July and asked and was told not at that time and just needs to know when. Please call back and advise at [phone].

## 2020-10-15 ENCOUNTER — OFFICE VISIT (OUTPATIENT)
Dept: INTERNAL MEDICINE | Age: 70
End: 2020-10-15

## 2020-10-15 VITALS
RESPIRATION RATE: 13 BRPM | WEIGHT: 141.8 LBS | HEART RATE: 65 BPM | OXYGEN SATURATION: 99 % | SYSTOLIC BLOOD PRESSURE: 130 MMHG | TEMPERATURE: 97.4 F | HEIGHT: 62 IN | BODY MASS INDEX: 26.09 KG/M2 | DIASTOLIC BLOOD PRESSURE: 60 MMHG

## 2020-10-15 DIAGNOSIS — E03.8 SUBCLINICAL HYPOTHYROIDISM: ICD-10-CM

## 2020-10-15 DIAGNOSIS — E55.9 VITAMIN D DEFICIENCY DISEASE: ICD-10-CM

## 2020-10-15 DIAGNOSIS — E78.5 HYPERLIPIDEMIA, UNSPECIFIED HYPERLIPIDEMIA TYPE: ICD-10-CM

## 2020-10-15 DIAGNOSIS — R32 URINARY INCONTINENCE IN FEMALE: ICD-10-CM

## 2020-10-15 DIAGNOSIS — M54.32 SCIATICA OF LEFT SIDE: ICD-10-CM

## 2020-10-15 DIAGNOSIS — I10 HYPERTENSION, ESSENTIAL: Primary | ICD-10-CM

## 2020-10-15 PROCEDURE — 99214 OFFICE O/P EST MOD 30 MIN: CPT | Performed by: INTERNAL MEDICINE

## 2020-10-15 RX ORDER — MULTIVIT WITH MINERALS/LUTEIN
1000 TABLET ORAL DAILY
COMMUNITY

## 2020-10-15 NOTE — PROGRESS NOTES
"  Sandi Acevedo is a 70 y.o. female who presents with   Chief Complaint   Patient presents with   • Hypertension     Amlodipine 5 mg   • Hyperlipidemia     No prescription treatment   • Hypothyroidism     Subclinical hypothyroidism.;  No prescription treatment   • Vitamin D Deficiency     Vitamin D3 1000 units daily   • Left leg sciatica     Recurring problem.  Has had physical therapy in the past.  Wants to get it again.   .    70-year-old female.  6-month checkup/lab update visit for problems as outlined above.  Has had left leg sciatic symptoms in the past and it has \"come back\" she says.  She has been to [facility] physical therapy center at the [facility] and would like to be referred back to that facility for evaluation and treatment.    Hypertension  This is a chronic problem. The current episode started more than 1 year ago. The problem has been waxing and waning since onset. The problem is controlled. Current antihypertension treatment includes calcium channel blockers. The current treatment provides moderate improvement.   Hyperlipidemia  This is a chronic problem. The current episode started more than 1 year ago. The problem is controlled. Recent lipid tests were reviewed and are normal. Exacerbating diseases include hypothyroidism. She is currently on no antihyperlipidemic treatment.   Hypothyroidism  This is a chronic (Subclinical hypothyroidism) problem. The problem has been unchanged. She has tried nothing for the symptoms.        /60   Pulse 65   Temp 97.4 °F (36.3 °C)   Resp 13   Ht 157.5 cm (62.01\")   Wt 64.3 kg (141 lb 12.8 oz)   SpO2 99%   BMI 25.93 kg/m²     The following portions of the patient's history were reviewed and updated as appropriate: allergies, current medications, past medical history and problem list.    Review of Systems   Constitutional: Negative.    HENT: Negative.    Eyes: Negative.    Respiratory: Negative.    Cardiovascular: Negative.    Genitourinary: " Negative.    Musculoskeletal: Negative.    Skin: Negative.    Neurological: Negative.    Psychiatric/Behavioral: Negative.        Objective   Physical Exam  Vitals signs and nursing note reviewed.   Constitutional:       General: She is not in acute distress.     Appearance: She is well-developed. She is not diaphoretic.   HENT:      Head: Normocephalic and atraumatic.   Eyes:      Pupils: Pupils are equal, round, and reactive to light.   Neck:      Musculoskeletal: Normal range of motion and neck supple.      Thyroid: No thyromegaly.      Comments: Neck exam negative.  Carotid auscultation normal-no bruits heard.    Cardiovascular:      Rate and Rhythm: Normal rate and regular rhythm.      Heart sounds: Normal heart sounds. No murmur. No friction rub. No gallop.    Pulmonary:      Effort: Pulmonary effort is normal. No respiratory distress.      Breath sounds: Normal breath sounds. No wheezing or rales.   Chest:      Chest wall: No tenderness.   Skin:     General: Skin is warm and dry.      Coloration: Skin is not pale.      Findings: No erythema.   Psychiatric:         Behavior: Behavior normal.         Thought Content: Thought content normal.         Judgment: Judgment normal.         Assessment/Plan   Diagnoses and all orders for this visit:    1. Hypertension, essential (Primary)  -     Comprehensive Metabolic Panel    2. Hyperlipidemia, unspecified hyperlipidemia type  -     Comprehensive Metabolic Panel  -     Lipid Panel    3. Subclinical hypothyroidism  -     Comprehensive Metabolic Panel  -     TSH+Free T4    4. Urinary incontinence in female  -     Comprehensive Metabolic Panel  -     Urinalysis With Culture If Indicated -    5. Vitamin D deficiency disease  -     Vitamin D 25 Hydroxy    6. Sciatica of left side  -     Ambulatory Referral to Physical Therapy Evaluate and treat        Plan: Labs as above for the physical issues as outlined.  Continue treatment as prescribed.  6-month checkup/lab update  visit advised assuming today's labs are acceptable.    Physical therapy referral per request to the [facility] at the [place].  She did not want to get any x-rays or MRIs at this time

## 2020-10-19 DIAGNOSIS — N30.90 CYSTITIS: Primary | ICD-10-CM

## 2020-10-19 LAB
25(OH)D3+25(OH)D2 SERPL-MCNC: 66.2 NG/ML (ref 30–100)
ALBUMIN SERPL-MCNC: 4.5 G/DL (ref 3.5–5.2)
ALBUMIN/GLOB SERPL: 2.1 G/DL
ALP SERPL-CCNC: 103 U/L (ref 39–117)
ALT SERPL-CCNC: 23 U/L (ref 1–33)
APPEARANCE UR: CLEAR
AST SERPL-CCNC: 31 U/L (ref 1–32)
BACTERIA #/AREA URNS HPF: NORMAL /HPF
BACTERIA UR CULT: ABNORMAL
BACTERIA UR CULT: ABNORMAL
BILIRUB SERPL-MCNC: 0.7 MG/DL (ref 0–1.2)
BILIRUB UR QL STRIP: NEGATIVE
BUN SERPL-MCNC: 17 MG/DL (ref 8–23)
BUN/CREAT SERPL: 18.5 (ref 7–25)
CALCIUM SERPL-MCNC: 9.3 MG/DL (ref 8.6–10.5)
CHLORIDE SERPL-SCNC: 106 MMOL/L (ref 98–107)
CHOLEST SERPL-MCNC: 202 MG/DL (ref 0–200)
CO2 SERPL-SCNC: 24.5 MMOL/L (ref 22–29)
COLOR UR: YELLOW
CREAT SERPL-MCNC: 0.92 MG/DL (ref 0.57–1)
EPI CELLS #/AREA URNS HPF: NORMAL /HPF (ref 0–10)
GLOBULIN SER CALC-MCNC: 2.1 GM/DL
GLUCOSE SERPL-MCNC: 97 MG/DL (ref 65–99)
GLUCOSE UR QL: NEGATIVE
HDLC SERPL-MCNC: 59 MG/DL (ref 40–60)
HGB UR QL STRIP: NEGATIVE
KETONES UR QL STRIP: NEGATIVE
LDLC SERPL CALC-MCNC: 129 MG/DL (ref 0–100)
LEUKOCYTE ESTERASE UR QL STRIP: ABNORMAL
MICRO URNS: ABNORMAL
MUCOUS THREADS URNS QL MICRO: PRESENT /HPF
NITRITE UR QL STRIP: NEGATIVE
OTHER ANTIBIOTIC SUSC ISLT: ABNORMAL
PH UR STRIP: 5.5 [PH] (ref 5–7.5)
POTASSIUM SERPL-SCNC: 4.6 MMOL/L (ref 3.5–5.2)
PROT SERPL-MCNC: 6.6 G/DL (ref 6–8.5)
PROT UR QL STRIP: NEGATIVE
RBC #/AREA URNS HPF: NORMAL /HPF (ref 0–2)
SODIUM SERPL-SCNC: 141 MMOL/L (ref 136–145)
SP GR UR: 1.02 (ref 1–1.03)
T4 FREE SERPL-MCNC: 1.2 NG/DL (ref 0.93–1.7)
TRIGL SERPL-MCNC: 75 MG/DL (ref 0–150)
TSH SERPL DL<=0.005 MIU/L-ACNC: 2.06 UIU/ML (ref 0.27–4.2)
URINALYSIS REFLEX: ABNORMAL
UROBILINOGEN UR STRIP-MCNC: 0.2 MG/DL (ref 0.2–1)
VLDLC SERPL CALC-MCNC: 14 MG/DL (ref 5–40)
WBC #/AREA URNS HPF: NORMAL /HPF (ref 0–5)

## 2020-10-19 RX ORDER — AMOXICILLIN AND CLAVULANATE POTASSIUM 875; 125 MG/1; MG/1
1 TABLET, FILM COATED ORAL 2 TIMES DAILY
Qty: 20 TABLET | Refills: 0 | Status: SHIPPED | OUTPATIENT
Start: 2020-10-19 | End: 2020-12-08

## 2020-12-08 ENCOUNTER — OFFICE VISIT (OUTPATIENT)
Dept: INTERNAL MEDICINE | Age: 70
End: 2020-12-08

## 2020-12-08 ENCOUNTER — TELEPHONE (OUTPATIENT)
Dept: INTERNAL MEDICINE | Age: 70
End: 2020-12-08

## 2020-12-08 VITALS
SYSTOLIC BLOOD PRESSURE: 150 MMHG | BODY MASS INDEX: 26.54 KG/M2 | HEIGHT: 62 IN | HEART RATE: 77 BPM | DIASTOLIC BLOOD PRESSURE: 68 MMHG | TEMPERATURE: 96.4 F | OXYGEN SATURATION: 98 % | WEIGHT: 144.2 LBS

## 2020-12-08 DIAGNOSIS — R82.90 FOUL SMELLING URINE: ICD-10-CM

## 2020-12-08 DIAGNOSIS — M54.32 SCIATICA OF LEFT SIDE: Primary | ICD-10-CM

## 2020-12-08 DIAGNOSIS — N30.00 ACUTE CYSTITIS WITHOUT HEMATURIA: ICD-10-CM

## 2020-12-08 LAB
BILIRUB BLD-MCNC: NEGATIVE MG/DL
CLARITY, POC: CLEAR
COLOR UR: YELLOW
GLUCOSE UR STRIP-MCNC: NEGATIVE MG/DL
KETONES UR QL: NEGATIVE
LEUKOCYTE EST, POC: ABNORMAL
NITRITE UR-MCNC: NEGATIVE MG/ML
PH UR: 5 [PH] (ref 5–8)
PROT UR STRIP-MCNC: NEGATIVE MG/DL
RBC # UR STRIP: ABNORMAL /UL
SP GR UR: 1.03 (ref 1–1.03)
UROBILINOGEN UR QL: NORMAL

## 2020-12-08 PROCEDURE — 81003 URINALYSIS AUTO W/O SCOPE: CPT | Performed by: NURSE PRACTITIONER

## 2020-12-08 PROCEDURE — 99213 OFFICE O/P EST LOW 20 MIN: CPT | Performed by: NURSE PRACTITIONER

## 2020-12-08 RX ORDER — METHYLPREDNISOLONE 4 MG/1
TABLET ORAL
Qty: 1 EACH | Refills: 0 | Status: SHIPPED | OUTPATIENT
Start: 2020-12-08 | End: 2021-06-11

## 2020-12-08 RX ORDER — MOMETASONE FUROATE 1 MG/G
OINTMENT TOPICAL
COMMUNITY
Start: 2020-12-03 | End: 2022-12-30

## 2020-12-08 RX ORDER — CYCLOBENZAPRINE HCL 5 MG
5 TABLET ORAL 3 TIMES DAILY PRN
Qty: 21 TABLET | Refills: 0 | Status: SHIPPED | OUTPATIENT
Start: 2020-12-08 | End: 2020-12-10 | Stop reason: SDUPTHER

## 2020-12-08 RX ORDER — CEPHALEXIN 500 MG/1
500 CAPSULE ORAL 2 TIMES DAILY
Qty: 14 CAPSULE | Refills: 0 | Status: SHIPPED | OUTPATIENT
Start: 2020-12-08 | End: 2020-12-15

## 2020-12-08 NOTE — TELEPHONE ENCOUNTER
----- Message from Cinthya Carter MA sent at 2020  7:13 AM EST -----  Regarding: FW: Non-Urgent Medical Question  Contact: [phone]    ----- Message -----  From: Sandi Acevedo  Sent: 2020   6:55 AM EST  To: Aleksandar [id number]  Subject: Non-Urgent Medical Question                      Hi   I went to pt for my sciatica nerve pain that I have had for awhile and it did not help. Can I get a shot for it to go away ? Also recently I had an uti  and took all the meds. My urine has an odor. Can I get my urine checked again? I was thinking Friday a.m.  Thanks   Sandi HENRIQUEZ 1950

## 2020-12-10 DIAGNOSIS — M54.32 SCIATICA OF LEFT SIDE: ICD-10-CM

## 2020-12-10 DIAGNOSIS — M54.50 LOW BACK PAIN, UNSPECIFIED BACK PAIN LATERALITY, UNSPECIFIED CHRONICITY, UNSPECIFIED WHETHER SCIATICA PRESENT: Primary | ICD-10-CM

## 2020-12-10 RX ORDER — CYCLOBENZAPRINE HCL 5 MG
5 TABLET ORAL 3 TIMES DAILY PRN
Qty: 21 TABLET | Refills: 0 | Status: SHIPPED | OUTPATIENT
Start: 2020-12-10 | End: 2021-06-11

## 2021-03-02 DIAGNOSIS — Z23 IMMUNIZATION DUE: ICD-10-CM

## 2021-03-26 ENCOUNTER — TRANSCRIBE ORDERS (OUTPATIENT)
Dept: ADMINISTRATIVE | Facility: HOSPITAL | Age: 71
End: 2021-03-26

## 2021-03-26 DIAGNOSIS — Z12.31 SCREENING MAMMOGRAM FOR HIGH-RISK PATIENT: Primary | ICD-10-CM

## 2021-04-08 ENCOUNTER — TRANSCRIBE ORDERS (OUTPATIENT)
Dept: ADMINISTRATIVE | Facility: HOSPITAL | Age: 71
End: 2021-04-08

## 2021-04-08 DIAGNOSIS — Z12.31 SCREENING MAMMOGRAM, ENCOUNTER FOR: Primary | ICD-10-CM

## 2021-06-11 ENCOUNTER — OFFICE VISIT (OUTPATIENT)
Dept: INTERNAL MEDICINE | Age: 71
End: 2021-06-11

## 2021-06-11 VITALS
HEIGHT: 62 IN | BODY MASS INDEX: 26.13 KG/M2 | WEIGHT: 142 LBS | TEMPERATURE: 96.6 F | DIASTOLIC BLOOD PRESSURE: 82 MMHG | OXYGEN SATURATION: 98 % | SYSTOLIC BLOOD PRESSURE: 140 MMHG | HEART RATE: 86 BPM

## 2021-06-11 DIAGNOSIS — E78.5 HYPERLIPIDEMIA, UNSPECIFIED HYPERLIPIDEMIA TYPE: ICD-10-CM

## 2021-06-11 DIAGNOSIS — I10 WHITE COAT SYNDROME WITH DIAGNOSIS OF HYPERTENSION: ICD-10-CM

## 2021-06-11 DIAGNOSIS — Z00.00 MEDICARE ANNUAL WELLNESS VISIT, INITIAL: Primary | ICD-10-CM

## 2021-06-11 DIAGNOSIS — E03.8 SUBCLINICAL HYPOTHYROIDISM: ICD-10-CM

## 2021-06-11 DIAGNOSIS — I10 HYPERTENSION, ESSENTIAL: ICD-10-CM

## 2021-06-11 DIAGNOSIS — Z79.899 LONG TERM CURRENT USE OF THERAPEUTIC DRUG: ICD-10-CM

## 2021-06-11 DIAGNOSIS — F41.9 ANXIETY: ICD-10-CM

## 2021-06-11 PROCEDURE — 99214 OFFICE O/P EST MOD 30 MIN: CPT | Performed by: NURSE PRACTITIONER

## 2021-06-11 PROCEDURE — G0439 PPPS, SUBSEQ VISIT: HCPCS | Performed by: NURSE PRACTITIONER

## 2021-06-11 RX ORDER — AMLODIPINE BESYLATE 5 MG/1
5 TABLET ORAL DAILY
Qty: 90 TABLET | Refills: 1 | Status: SHIPPED | OUTPATIENT
Start: 2021-06-11 | End: 2022-01-03

## 2021-06-11 RX ORDER — LORAZEPAM 0.5 MG/1
0.5 TABLET ORAL EVERY 8 HOURS PRN
Qty: 30 TABLET | Refills: 0 | Status: SHIPPED | OUTPATIENT
Start: 2021-06-11 | End: 2022-10-04 | Stop reason: SDUPTHER

## 2021-06-11 RX ORDER — AMLODIPINE BESYLATE 2.5 MG/1
2.5 TABLET ORAL DAILY
Qty: 90 TABLET | Refills: 1 | Status: SHIPPED | OUTPATIENT
Start: 2021-06-11 | End: 2022-01-03

## 2021-06-11 NOTE — PATIENT INSTRUCTIONS
Medicare Wellness  Personal Prevention Plan of Service     Date of Office Visit:  2021  Encounter Provider:  DARREN Cook  Place of Service:  [facility]  Patient Name: Sandi Acevedo  :  1950    As part of the Medicare Wellness portion of your visit today, we are providing you with this personalized preventive plan of services (PPPS). This plan is based upon recommendations of the [place] Preventive Services Task Force (USPSTF) and the Advisory Committee on Immunization Practices (ACIP).    This lists the preventive care services that should be considered, and provides dates of when you are due. Items listed as completed are up-to-date and do not require any further intervention.      Age-Appropriate Screening Schedule:  (Refer to the list below for future screening recommendations based on patient's age, sex and/or medical conditions. Orders for these recommended tests are listed in the plan section. The patient has been provided with a written plan)    Health Maintenance Topics  Health Maintenance   Topic Date Due   • COVID-19 Vaccine (1) Never done   • TDAP/TD VACCINES (2 - Td or Tdap) 2018   • ZOSTER VACCINE (2 of 2) 10/15/2021 (Originally 2017)   • INFLUENZA VACCINE  2021   • LIPID PANEL  10/15/2021   • MAMMOGRAM  2021   • DXA SCAN  2021   • ANNUAL WELLNESS VISIT  2022   • COLORECTAL CANCER SCREENING  2029   • HEPATITIS C SCREENING  Completed   • Pneumococcal Vaccine 65+  Completed       Health Maintenance Topics Due or Over-Due  Health Maintenance Due   Topic Date Due   • COVID-19 Vaccine (1) Never done   • TDAP/TD VACCINES (2 - Td or Tdap) 2018         ADDITIONAL RESOURCES:    For excellent information on senior health and wellness refer to the [employer] web-site at:    WWW.NIHSENIORHEALTH.GOV

## 2021-06-11 NOTE — PROGRESS NOTES
"  ENCOUNTER DATE:  06/11/2021    Sandi Acevedo / 70 y.o. / female        MEDICARE ANNUAL WELLNESS VISIT     Chief Complaint:   Chief Complaint   Patient presents with   • Medicare Wellness-Initial Visit        Patient's general assessment of her health since a year ago:     - Compared to one year ago, she feels her physical health is about the same without significant change.    - Compared to one year ago, she feels her mental health is about the same without significant change.      HPI for other active medical problems:     Hypertension: Patient here for follow-up of elevated blood pressure. She is exercising and is adherent to low salt diet.  Blood pressure is well controlled at home. Cardiac symptoms none. Patient denies chest pain, chest pressure/discomfort and lower extremity edema.  Cardiovascular risk factors: advanced age (older than 55 for men, 65 for women) and hypertension. Use of agents associated with hypertension: none. History of target organ damage: none.  BP readings at 130s/70-80s, occasionally up to 140.     Anxiety: Previous prescribed Lorazepam 0.5mg per Dr. Joaquin, patient takes only occasionally (still has original bottle RX 2018 with pills left). Requesting RX.     * The required components of Health Risk Assessment (HRA) that were completed by the patient and/or my staff are contained within this note and in the scanned documents titled \"Health Risk Assessment\" within the media section of the patient's chart in [facility].       HISTORY    Recent Hospitalizations:    Recent hospitalization?: NO     If YES, location, date, and diagnoses:     · Location:   · Date:   · Principle Discharge Dx:   · Secondary Dx:       Patient Care Team:    Patient Care Team:  Leeanna Cochran APRN as PCP - General (Internal Medicine)  Eddie Sarah MD as Consulting Physician (Ophthalmology)  Ruthy Naidu MD as Consulting Physician (Gynecology)  Jordon Rivera DPM as Consulting Physician " (Podiatry)      Allergies:  Benazepril    Medications:  Current Outpatient Medications   Medication Sig Dispense Refill   • amLODIPine (NORVASC) 2.5 MG tablet Take 1 tablet by mouth Daily. 90 tablet 1   • amLODIPine (NORVASC) 5 MG tablet Take 1 tablet by mouth Daily. Takes in combination with 2.5mg for total 7.5mg daily. 90 tablet 1   • ASPIRIN 81 PO Take 1 tablet by mouth Daily.     • calcium carbonate (OS-ROCIO) 600 MG tablet Take 600 mg by mouth daily.     • cholecalciferol (VITAMIN D3) 1000 UNITS tablet Take 1,000 Units by mouth daily.     • Flaxseed, Linseed, (FLAXSEED OIL PO) Take  by mouth.     • fluticasone (FLONASE) 50 MCG/ACT nasal spray 2 sprays by Each Nare route Daily. 48 mL 2   • mometasone (ELOCON) 0.1 % ointment      • Probiotic Product (PROBIOTIC DAILY PO) Take  by mouth.     • vitamin E 1000 UNIT capsule Take 1,000 Units by mouth Daily.     • Zinc 50 MG capsule Take  by mouth.       No current facility-administered medications for this visit.        PFSH:     The following portions of the patient's history were reviewed and updated as appropriate: Allergies / Current Medications / Past Medical History / Surgical History / Social History / Family History    Problem List:  Patient Active Problem List   Diagnosis   • Hypertension, essential   • Dupuytren's contracture of hand (Keo Bauer)   • Glaucoma associated with ocular disorder ([facility])   • Low back pain   • Carotid stenosis mild ( 2/2012)   • Blepharitis of both eyes   • Lichen sclerosus (GYN = isabell Garza)   • Agoraphobia   • Urinary incontinence in female   • Squamous cell carcinoma   • Osteopenia   • FH: uterine cancer   • FH: CAD (coronary artery disease)   • Vitamin D deficiency disease   • Postmenopausal   • Panic anxiety syndrome (Xanax)   • Hayfever   • Osteoarthritis   • Routine health maintenance   • Cough due to ACE inhibitor   • COPD (chronic obstructive pulmonary disease) (CMS/[facility])   • Pure hypercholesterolemia   • Palpitations   •  Hyperlipidemia   • Subclinical hypothyroidism   • Plantar fasciitis of left foot   • Sciatica of left side       Past Medical History:  Past Medical History:   Diagnosis Date   • Allergic    • Anxiety    • Arthritis    • Cataract    • COPD (chronic obstructive pulmonary disease) (CMS/[facility])     Mild obstructive defect noted    • Emphysema of lung (CMS/[facility])    • Hypertension    • Low back pain    • Osteopenia    • Plantar fasciitis of left foot 2018   • Urinary tract infection    • Visual impairment        Past Surgical History:  Past Surgical History:   Procedure Laterality Date   • CYST REMOVAL Right 2015    Neck- Raya Bauer   • EYE SURGERY Left 1955    to repair lazy eye   • LAPAROSCOPIC TUBAL LIGATION Bilateral    • TUBAL ABDOMINAL LIGATION         Social History:  Social History     Socioeconomic History   • Marital status:      Spouse name: none   • Number of children: 2   • Years of education: 12    • Highest education level: Not on file   Tobacco Use   • Smoking status: Never Smoker   • Smokeless tobacco: Never Used   Substance and Sexual Activity   • Alcohol use: No   • Drug use: No   • Sexual activity: Never     Partners: Male       Family History:  Family History   Problem Relation Age of Onset   • Arthritis Mother            • Cancer Mother         Esophagus   • Glaucoma Mother    • Hypertension Mother            • Stroke Mother            • Osteoporosis Mother    • Ovarian cancer Mother    • Heart disease Mother         Congestive heart   • Heart disease Father    • Stroke Father            • Early death Father         Ceraboral hemmorage   • Hypertension Father            • Thyroid disease Sister    • Hyperlipidemia Brother    • Hypertension Brother    • Asthma Daughter    • Heart disease Maternal Aunt    • Asthma Daughter         She is on meds   • Heart disease Maternal Aunt         Congestive heart   • Heart disease Paternal Aunt   "       Stroke   • Hyperlipidemia Brother         On meds   • Hypertension Brother    • Stroke Maternal Aunt         On meds   • Thyroid disease Sister         On meds         PATIENT ASSESSMENT    Vitals:  /82   Pulse 86   Temp 96.6 °F (35.9 °C) (Temporal)   Ht 157.5 cm (62.01\")   Wt 64.4 kg (142 lb)   LMP  (LMP Unknown)   SpO2 98%   BMI 25.97 kg/m²   BP Readings from Last 3 Encounters:   06/11/21 140/82   12/08/20 150/68   10/15/20 130/60     Wt Readings from Last 3 Encounters:   06/11/21 64.4 kg (142 lb)   12/08/20 65.4 kg (144 lb 3.2 oz)   10/15/20 64.3 kg (141 lb 12.8 oz)      Body mass index is 25.97 kg/m².    There were no vitals filed for this visit.      Review of Systems:    Review of Systems   Constitutional: Negative for activity change, appetite change and unexpected weight change.   HENT: Negative for tinnitus.    Eyes: Negative for visual disturbance.   Respiratory: Negative for cough, chest tightness and shortness of breath.    Cardiovascular: Negative for chest pain, palpitations and leg swelling.   Neurological: Negative for dizziness, light-headedness and headaches.   Psychiatric/Behavioral: The patient is nervous/anxious.        Physical Exam:    Physical Exam  Vitals and nursing note reviewed.   Constitutional:       General: She is not in acute distress.     Appearance: She is well-developed. She is not ill-appearing.   Cardiovascular:      Rate and Rhythm: Normal rate and regular rhythm.      Heart sounds: Normal heart sounds, S1 normal and S2 normal. No murmur heard.     Pulmonary:      Effort: Pulmonary effort is normal.      Breath sounds: Normal breath sounds. No decreased breath sounds, wheezing, rhonchi or rales.   Skin:     General: Skin is warm and dry.   Neurological:      Mental Status: She is alert and oriented to person, place, and time.   Psychiatric:         Speech: Speech normal.         Behavior: Behavior normal. Behavior is cooperative.         Thought Content: " Thought content normal.         Judgment: Judgment normal.         Reviewed Data:    Labs:   Lab Results   Component Value Date     10/15/2020    K 4.6 10/15/2020    CALCIUM 9.3 10/15/2020    AST 31 10/15/2020    ALT 23 10/15/2020    BUN 17 10/15/2020    CREATININE 0.92 10/15/2020    CREATININE 0.82 11/06/2019    CREATININE 0.75 05/03/2019    EGFRIFNONA 60 (L) 10/15/2020    EGFRIFAFRI 73 10/15/2020       Lab Results   Component Value Date    GLU 97 10/15/2020    GLU 95 11/06/2019     (H) 05/03/2019       Lab Results   Component Value Date     (H) 10/15/2020     (H) 11/06/2019     (H) 05/03/2019    HDL 59 10/15/2020    TRIG 75 10/15/2020       Lab Results   Component Value Date    TSH 2.060 10/15/2020    FREET4 1.20 10/15/2020          Lab Results   Component Value Date    WBC 7.44 12/19/2018    HGB 14.3 12/19/2018    HGB 14.4 10/25/2017    HGB 14.5 05/23/2016     12/19/2018                 No results found for: PSA    Imaging:          Medical Tests:          Screening for Glaucoma:  Previous screening for glaucoma?: Yes      Hearing Loss Screen:  Finger Rub Hearing Test (right ear): passed  Finger Rub Hearing Test (left ear): passed      Urinary Incontinence Screen:  Episodes of urinary incontinence? : No      Depression Screen:  PHQ-2/PHQ-9 Depression Screening 6/11/2021   Little interest or pleasure in doing things 0   Feeling down, depressed, or hopeless 0   Total Score 0        PHQ-2: 0 (Not depressed)    PHQ-9: 0 (Negative screening for depression)       FUNCTIONAL, FALL RISK, & COGNITIVE SCREENING (Components below):    DATA:    Functional & Cognitive Status 6/11/2021   Do you have difficulty preparing food and eating? No   Do you have difficulty bathing yourself, getting dressed or grooming yourself? No   Do you have difficulty using the toilet? No   Do you have difficulty moving around from place to place? No   Do you have trouble with steps or getting out of a bed  or a chair? No   Current Diet Well Balanced Diet   Dental Exam Not up to date   Eye Exam Not up to date   Exercise (times per week) 3 times per week   Current Exercise Activities Include Walking   Do you need help using the phone?  No   Are you deaf or do you have serious difficulty hearing?  No   Do you need help with transportation? No   Do you need help shopping? No   Do you need help preparing meals?  No   Do you need help with housework?  No   Do you need help with laundry? No   Do you need help taking your medications? No   Do you need help managing money? No   Do you ever drive or ride in a car without wearing a seat belt? No   Do you have difficulty concentrating, remembering or making decisions? No         A) Assessment of Functional Ability:  (Assessment of ability to perform ADL's (showering/bathing, using toilet, dressing, feeding self, moving self around) and IADL's (use telephone, shop, prepare food, housekeep, do laundry, transport independently, take medications independently, and handle finances)    Degree of functional impairment: NONE (based on assessment noted above)      B) Assessment of Fall Risk:  Fall Risk Assessment was completed, and patient is at low risk for falls.  Teaching water aerobics 6 days/week.        Need for further evaluation of gait, strength, and balance? : No    Timed Up and Go (TUG):   (>= 12 seconds indicates high risk for falling)    Observable abnormalities included: Normal gait pattern       C. Assessment of Cognitive Function:    Mini-Cog Test:     1) Registration (3 objects): N/A   2) Number of objects recalled: 3   3) Clock Draw: Passed? : Yes       Further evaluation required? : No      COUNSELING    A. Identification of Health Risk Factors:    Risk factors include: cardiovascular risk factors      B. Age-Appropriate Screening Schedule:  (Refer to the list below for future screening recommendations based on patient's age, sex and/or medical conditions. Orders for  these recommended tests are listed in the plan section. The patient has been provided with a written plan)    Health Maintenance Topics  Health Maintenance   Topic Date Due   • TDAP/TD VACCINES (2 - Td or Tdap) 06/18/2018   • ZOSTER VACCINE (2 of 2) 10/15/2021 (Originally 6/23/2017)   • INFLUENZA VACCINE  08/01/2021   • LIPID PANEL  10/15/2021   • MAMMOGRAM  12/04/2021   • DXA SCAN  12/04/2021       Health Maintenance Topics Due or Over-Due  Health Maintenance Due   Topic Date Due   • COVID-19 Vaccine (1) Never done   • TDAP/TD VACCINES (2 - Td or Tdap) 06/18/2018         C. Advanced Care Planning:    Advance Care Planning   ACP discussion was held with the patient during this visit. Patient does not have an advance directive, declines further assistance.       D. Patient Self-Management and Personalized Health Advice:    She has been provided with personalized counseling/information (including brochures/handouts) about:     -- managing depression/anxiety      She has been recommended for the following preventative services which has been performed today, will be ordered today or ordered/performed on upcoming follow-up visit:     -- fall risk assessment / plan of care completed, screening for osteoporosis recommended (managed by gyne and DEXA will be performed through that office), screening for breast cancer is managed by another provider and mammogram will be arranged through that office      E. Miscellaneous Items:    -Aspirin use counseling: Taking ASA appropriately as indicated    -Discussed BMI with her. The BMI is above average; BMI management plan will be discussed on follow-up visit    -Reviewed use of high risk medication in the elderly: YES    -Reviewed for potential of harmful drug interactions in the elderly: YES      IV. WRAP-UP    Assessment & Plan:    1) MEDICARE ANNUAL WELLNESS VISIT    2) OTHER MEDICAL CONDITIONS ADDRESSED TODAY:            Problem List Items Addressed This Visit        Cardiac and  Vasculature    Hypertension, essential    Relevant Medications    amLODIPine (NORVASC) 5 MG tablet    amLODIPine (NORVASC) 2.5 MG tablet    Other Relevant Orders    Comprehensive Metabolic Panel    Urinalysis With Culture If Indicated - Urine, Clean Catch    Hyperlipidemia    Relevant Orders    Lipid Panel With / Chol / HDL Ratio       Endocrine and Metabolic    Subclinical hypothyroidism    Overview     October 2017           Other Visit Diagnoses     Medicare annual wellness visit, initial    -  Primary    Anxiety        Relevant Orders    Compliance Drug Analysis, Ur - Urine, Clean Catch    Long term current use of therapeutic drug        Relevant Orders    Compliance Drug Analysis, Ur - Urine, Clean Catch    White coat syndrome with diagnosis of hypertension        Relevant Medications    amLODIPine (NORVASC) 5 MG tablet    amLODIPine (NORVASC) 2.5 MG tablet                    Orders Placed This Encounter   Procedures   • Comprehensive Metabolic Panel   • Lipid Panel With / Chol / HDL Ratio   • Compliance Drug Analysis, Ur - Urine, Clean Catch   • Urinalysis With Culture If Indicated - Urine, Clean Catch       Discussion / Summary:    1. Medicare annual wellness visit, initial      2. Hypertension, essential  BP high today. Patient forgot to take meds last night.   Continue lifestyle modifications for high blood pressure, high cholesterol, and increased weight. Decrease/eliminate soda, caffeine, alcohol and overall caloric intake. Reduce carbohydrates and sweets in diet.  Continue to improve dietary habits with lean proteins, fresh vegetables, fruits, and nuts. Improve aerobic exercise: walking/biking/swimming daily as tolerated, recommend 30 minutes/day at least 5 days/week.     - amLODIPine (NORVASC) 5 MG tablet; Take 1 tablet by mouth Daily. Takes in combination with 2.5mg for total 7.5mg daily.  Dispense: 90 tablet; Refill: 1  - amLODIPine (NORVASC) 2.5 MG tablet; Take 1 tablet by mouth Daily.  Dispense: 90  tablet; Refill: 1  - Comprehensive Metabolic Panel  - Urinalysis With Culture If Indicated - Urine, Clean Catch    3. Anxiety  Will refill RX Lorazepam # 30. Uses only rarely.   UDS complete and controlled substance agreement discussed and obtained. PDMP query complete and reviewed; Patient is having medication refilled at expected intervals. Using medication appropriately without evidence of unusual increased use, abuse, or diverting.       - Compliance Drug Analysis, Ur - Urine, Clean Catch  - LORazepam (Ativan) 0.5 MG tablet; Take 1 tablet by mouth Every 8 (Eight) Hours As Needed for Anxiety.  Dispense: 30 tablet; Refill: 0    4. Hyperlipidemia, unspecified hyperlipidemia type  Check fasting lipid panel today.    Continue to follow and improve on low-fat, low carbohydrate diet.     - Lipid Panel With / Chol / HDL Ratio    5. Long term current use of therapeutic drug    - Compliance Drug Analysis, Ur - Urine, Clean Catch    6. Subclinical hypothyroidism  Lab Results   Component Value Date    TSH 2.060 10/15/2020         7. White coat syndrome with diagnosis of hypertension              Medications as of TODAY:              Current Outpatient Medications   Medication Sig Dispense Refill   • amLODIPine (NORVASC) 2.5 MG tablet Take 1 tablet by mouth Daily. 90 tablet 1   • amLODIPine (NORVASC) 5 MG tablet Take 1 tablet by mouth Daily. Takes in combination with 2.5mg for total 7.5mg daily. 90 tablet 1   • ASPIRIN 81 PO Take 1 tablet by mouth Daily.     • calcium carbonate (OS-ROCIO) 600 MG tablet Take 600 mg by mouth daily.     • cholecalciferol (VITAMIN D3) 1000 UNITS tablet Take 1,000 Units by mouth daily.     • Flaxseed, Linseed, (FLAXSEED OIL PO) Take  by mouth.     • fluticasone (FLONASE) 50 MCG/ACT nasal spray 2 sprays by Each Nare route Daily. 48 mL 2   • mometasone (ELOCON) 0.1 % ointment      • Probiotic Product (PROBIOTIC DAILY PO) Take  by mouth.     • vitamin E 1000 UNIT capsule Take 1,000 Units by mouth  Daily.     • Zinc 50 MG capsule Take  by mouth.       No current facility-administered medications for this visit.         FOLLOW-UP:            Return in about 6 months (around 12/11/2021) for Next scheduled follow up.                 Future Appointments   Date Time Provider Department Center   7/2/2021  8:15 AM [facility]   12/17/2021  7:30 AM Leeanna Cochran, [facility]         ______________________________________________________________________      After Visit Summary (AVS) including the Personalized Prevention  Plan Services (PPPS) was either printed and given to the patient at check-out today and/or sent to [facility] for review.       *Examiner was wearing KN95 mask, face shield and exam gloves during the entire duration of the visit. Patient was masked the entire time.   Minimum social distance of 6 ft maintained entire visit except if physical contact was necessary as documented.      **Dragon Disclaimer:   Much of this encounter note is an electronic transcription/translation of spoken language to printed text. The electronic translation of spoken language may permit erroneous, or at times, nonsensical words or phrases to be inadvertently transcribed. Although I have reviewed the note for such errors, some may still exist.

## 2021-06-13 LAB
ALBUMIN SERPL-MCNC: 4.6 G/DL (ref 3.7–4.7)
ALBUMIN/GLOB SERPL: 2.1 {RATIO} (ref 1.2–2.2)
ALP SERPL-CCNC: 102 IU/L (ref 48–121)
ALT SERPL-CCNC: 19 IU/L (ref 0–32)
APPEARANCE UR: CLEAR
AST SERPL-CCNC: 26 IU/L (ref 0–40)
BACTERIA #/AREA URNS HPF: ABNORMAL /[HPF]
BACTERIA UR CULT: NORMAL
BACTERIA UR CULT: NORMAL
BILIRUB SERPL-MCNC: 0.7 MG/DL (ref 0–1.2)
BILIRUB UR QL STRIP: NEGATIVE
BUN SERPL-MCNC: 13 MG/DL (ref 8–27)
BUN/CREAT SERPL: 16 (ref 12–28)
CALCIUM SERPL-MCNC: 9.4 MG/DL (ref 8.7–10.3)
CASTS URNS QL MICRO: ABNORMAL /LPF
CHLORIDE SERPL-SCNC: 106 MMOL/L (ref 96–106)
CHOLEST SERPL-MCNC: 214 MG/DL (ref 100–199)
CHOLEST/HDLC SERPL: 3.8 RATIO (ref 0–4.4)
CO2 SERPL-SCNC: 22 MMOL/L (ref 20–29)
COLOR UR: YELLOW
CREAT SERPL-MCNC: 0.8 MG/DL (ref 0.57–1)
EPI CELLS #/AREA URNS HPF: ABNORMAL /HPF (ref 0–10)
GLOBULIN SER CALC-MCNC: 2.2 G/DL (ref 1.5–4.5)
GLUCOSE SERPL-MCNC: 104 MG/DL (ref 65–99)
GLUCOSE UR QL: NEGATIVE
HDLC SERPL-MCNC: 56 MG/DL
HGB UR QL STRIP: NEGATIVE
KETONES UR QL STRIP: NEGATIVE
LDLC SERPL CALC-MCNC: 131 MG/DL (ref 0–99)
LEUKOCYTE ESTERASE UR QL STRIP: ABNORMAL
MICRO URNS: ABNORMAL
NITRITE UR QL STRIP: NEGATIVE
PH UR STRIP: 7 [PH] (ref 5–7.5)
POTASSIUM SERPL-SCNC: 4.6 MMOL/L (ref 3.5–5.2)
PROT SERPL-MCNC: 6.8 G/DL (ref 6–8.5)
PROT UR QL STRIP: NEGATIVE
RBC #/AREA URNS HPF: ABNORMAL /HPF (ref 0–2)
SODIUM SERPL-SCNC: 142 MMOL/L (ref 134–144)
SP GR UR: 1.02 (ref 1–1.03)
TRIGL SERPL-MCNC: 151 MG/DL (ref 0–149)
URINALYSIS REFLEX: ABNORMAL
UROBILINOGEN UR STRIP-MCNC: 0.2 MG/DL (ref 0.2–1)
VLDLC SERPL CALC-MCNC: 27 MG/DL (ref 5–40)
WBC #/AREA URNS HPF: ABNORMAL /HPF (ref 0–5)

## 2021-06-17 LAB — DRUGS UR: NORMAL

## 2021-07-02 ENCOUNTER — HOSPITAL ENCOUNTER (OUTPATIENT)
Dept: MAMMOGRAPHY | Facility: HOSPITAL | Age: 71
Discharge: HOME | End: 2021-07-02
Admitting: SPECIALIST

## 2021-07-02 DIAGNOSIS — Z12.31 SCREENING MAMMOGRAM, ENCOUNTER FOR: ICD-10-CM

## 2021-07-02 PROCEDURE — 77067 SCR MAMMO BI INCL CAD: CPT

## 2021-07-02 PROCEDURE — 77063 BREAST TOMOSYNTHESIS BI: CPT

## 2021-09-03 ENCOUNTER — TELEPHONE (OUTPATIENT)
Dept: INTERNAL MEDICINE | Age: 71
End: 2021-09-03

## 2021-09-03 DIAGNOSIS — Z88.8 ALLERGY STATUS TO OTHER DRUGS, MEDICAMENTS AND BIOLOGICAL SUBSTANCES: Primary | ICD-10-CM

## 2021-09-03 NOTE — TELEPHONE ENCOUNTER
Provider: GORDON WYNN    Caller: LYNDA ROBB    Relationship to Patient: PATIENT    Phone Number: [phone]    Reason for Call: PATIENT HAS REQUESTED A CALL BACK FROM GORDON WYNN TO DISCUSS QUESTIONS SHE HAS ABOUT THE COVID VACCINE.     PLEASE CALL AND ADVISE

## 2021-09-03 NOTE — TELEPHONE ENCOUNTER
Pt was advised of dictation. She did not want to except this.  Pt would like to be referred to allergist for further discussion. MM

## 2021-09-03 NOTE — TELEPHONE ENCOUNTER
As a healthcare provider, I am expected to base my decision making on the most current research and guidelines. The only true contraindications to the vaccines (according to the CDC) are the following:     •Severe allergic reaction (e.g., anaphylaxis) after a previous dose or to a component of the COVID-19 vaccine    •Immediate allergic reaction of any severity to a previous dose or known (diagnosed) allergy to a component of the COVID- 19 vaccine      At this time, I will not be providing any exemptions unless these criteria are met and documentation of this exists.     However, if she would like, she can be referred to an allergy specialist to discuss this.

## 2021-09-03 NOTE — TELEPHONE ENCOUNTER
Pt instructed to return call. Pt instructed to leave detailed questions so these may be passed onto APRN. MM

## 2021-10-29 ENCOUNTER — TELEPHONE (OUTPATIENT)
Dept: INTERNAL MEDICINE | Age: 71
End: 2021-10-29

## 2021-10-29 NOTE — TELEPHONE ENCOUNTER
Patient called back. She has been requested for jury duty to start on December 6. She said that she can't sit on any of that due to her anxiety.  She would like a note to dismiss her.  She needs the note within 5 days per her letter.     If ok, please send to patients MyChart.

## 2021-10-30 NOTE — TELEPHONE ENCOUNTER
Contact patient.     Advise patient that she will need to send back her initial documentation sent by court asking for medical exemption (if she has not already). When you are summoned, there is a process that you follow that first starts with patient writing letter to court asking for exemption for medical reason. They will then determine if she needs medical documentation.

## 2021-11-01 ENCOUNTER — TELEPHONE (OUTPATIENT)
Dept: INTERNAL MEDICINE | Age: 71
End: 2021-11-01

## 2021-11-01 NOTE — TELEPHONE ENCOUNTER
Caller: Sandi Acevedo    Relationship: Self    Best call back number: [phone]     What is the best time to reach you: ANY    Who are you requesting to speak with (clinical staff, provider,  specific staff member):MINDAY    Do you know the name of the person who called: FREDERICK    What was the call regarding: RETURNED CALL    Do you require a callback: YES

## 2021-12-17 ENCOUNTER — OFFICE VISIT (OUTPATIENT)
Dept: INTERNAL MEDICINE | Age: 71
End: 2021-12-17

## 2021-12-17 VITALS
OXYGEN SATURATION: 98 % | SYSTOLIC BLOOD PRESSURE: 140 MMHG | WEIGHT: 144 LBS | HEIGHT: 62 IN | BODY MASS INDEX: 26.5 KG/M2 | DIASTOLIC BLOOD PRESSURE: 90 MMHG | TEMPERATURE: 97.6 F | HEART RATE: 69 BPM

## 2021-12-17 DIAGNOSIS — E55.9 VITAMIN D DEFICIENCY DISEASE: ICD-10-CM

## 2021-12-17 DIAGNOSIS — R92.8 ABNORMAL MAMMOGRAM: ICD-10-CM

## 2021-12-17 DIAGNOSIS — I10 HYPERTENSION, ESSENTIAL: Primary | ICD-10-CM

## 2021-12-17 DIAGNOSIS — R73.01 IFG (IMPAIRED FASTING GLUCOSE): ICD-10-CM

## 2021-12-17 DIAGNOSIS — E78.5 HYPERLIPIDEMIA, UNSPECIFIED HYPERLIPIDEMIA TYPE: ICD-10-CM

## 2021-12-17 DIAGNOSIS — F41.9 ANXIETY: ICD-10-CM

## 2021-12-17 DIAGNOSIS — Z79.899 LONG TERM CURRENT USE OF THERAPEUTIC DRUG: ICD-10-CM

## 2021-12-17 DIAGNOSIS — M85.89 OSTEOPENIA OF MULTIPLE SITES: ICD-10-CM

## 2021-12-17 PROCEDURE — 99214 OFFICE O/P EST MOD 30 MIN: CPT | Performed by: NURSE PRACTITIONER

## 2021-12-17 NOTE — PROGRESS NOTES
"Chief Complaint  Hyperlipidemia, Hypertension, and Vitamin D Deficiency    Subjective          Sandi Acevedo presents to [facility]  Hyperlipidemia  This is a chronic problem. Pertinent negatives include no chest pain or shortness of breath. Current antihyperlipidemic treatment includes herbal therapy. Risk factors for coronary artery disease include dyslipidemia, hypertension and post-menopausal.   Hypertension  This is a chronic problem. The problem is controlled (Reports home BP < 130/80). Pertinent negatives include no anxiety, blurred vision, chest pain, headaches, peripheral edema, PND, shortness of breath or sweats. Current antihypertension treatment includes calcium channel blockers. Hypertensive end-organ damage includes PVD.     Patient had abnormal mammogram of both breasts in July 2021 which was ordered by her GYN.  She did not complete any follow-up.  She reports \"I have always had inverted nipples \", she attributes the abnormalities to \"fatty tissue\". She does not want any further evaluation at this time.     Objective   Vital Signs:   /90   Pulse 69   Temp 97.6 °F (36.4 °C)   Ht 157.5 cm (62.01\")   Wt 65.3 kg (144 lb)   SpO2 98%   BMI 26.33 kg/m²     Physical Exam   Result Review :   The following data was reviewed by: DARREN Cook on 12/17/2021:  Common labs    Common Labsle 6/11/21 6/11/21    0851 0851   Glucose 104 (A)    BUN 13    Creatinine 0.80    eGFR Non  Am 74    eGFR African Am 85    Sodium 142    Potassium 4.6    Chloride 106    Calcium 9.4    Total Protein 6.8    Albumin 4.6    Total Bilirubin 0.7    Alkaline Phosphatase 102    AST (SGOT) 26    ALT (SGPT) 19    Total Cholesterol  214 (A)   Triglycerides  151 (A)   HDL Cholesterol  56   LDL Cholesterol   131 (A)   (A) Abnormal value       Comments are available for some flowsheets but are not being displayed.           Data reviewed: Radiologic studies      MAMMO SCREENING DIGITAL " TOMOSYNTHESIS BILATERAL W CAD-     CLINICAL INDICATION: 70 years old female presents for annual screening  mammogram.      COMPARISON: Prior examinations dating back to 11/30/2012.     TECHNIQUE: 2-D and tomosynthesis MLO and CC views of the breast(s) were  obtained      FINDINGS:  There are scattered areas of fibroglandular density.     There are 2 focal asymmetry in the outer central right breast.   There is an asymmetry in the outer anterior left breast on the CC view.   There are no suspicious calcifications in either breast.        IMPRESSION/RECOMMENDATION(S):  Indeterminate right breast asymmetries. Recommend further evaluation  with CC and MLO spot compression and lateral views with Tomosynthesis  and targeted ultrasound.  Indeterminate left breast asymmetry. Recommend further evaluation with  CC spot compression and lateral views with Tomosynthesis and possible  ultrasound.     BI-RADS Category 0: Incomplete - Need Additional Imaging Evaluation  and/or Prior Mammograms for Comparison         This report was finalized on 7/2/2021 11:49 AM by Dr. Holly Sanchez M.D.              Assessment and Plan    Diagnoses and all orders for this visit:    1. Hypertension, essential (Primary)  Blood pressure stable on current therapy, continue same.  Check labs today and adjust dosage of medication as necessary.  Follow-up 6 months.    -     Comprehensive Metabolic Panel  -     Urinalysis With Microscopic If Indicated (No Culture) - Urine, Clean Catch    2. Hyperlipidemia, unspecified hyperlipidemia type  Check fasting lipid panel today.   Continue to follow and improve on low-fat, low carbohydrate diet.     -     Lipid Panel With / Chol / HDL Ratio  -     CBC & Differential    3. Anxiety  UDS completed today, controlled substance agreement UTD until 6/2022. PDMP query complete and reviewed; Patient is having medication refilled at expected intervals. Using medication appropriately without evidence of unusual increased  use, abuse, or diverting.       -     Compliance Drug Analysis, Ur - Urine, Clean Catch    4. Long term current use of therapeutic drug  -     Compliance Drug Analysis, Ur - Urine, Clean Catch    5. IFG (impaired fasting glucose)  -     Hemoglobin A1c    6. Vitamin D deficiency disease  -     Vitamin D 25 Hydroxy    7. Osteopenia of multiple sites    8. Abnormal mammogram  Advised patient recommended follow-up for abnormal mammogram.  At this time she declines any further testing, despite knowing the risks of possible undetected malignant lesion. Will follow up with normally scheduled mammogram next year.   Of note, patient wants all GYN care to be transferred to me as she will no longer be seeing her GYN.       Follow Up   Return in about 6 months (around 6/17/2022) for Medicare Wellness.  Patient was given instructions and counseling regarding her condition or for health maintenance advice. Please see specific information pulled into the AVS if appropriate.

## 2021-12-18 LAB
25(OH)D3+25(OH)D2 SERPL-MCNC: 44.3 NG/ML (ref 30–100)
ALBUMIN SERPL-MCNC: 4.4 G/DL (ref 3.7–4.7)
ALBUMIN/GLOB SERPL: 1.8 {RATIO} (ref 1.2–2.2)
ALP SERPL-CCNC: 111 IU/L (ref 44–121)
ALT SERPL-CCNC: 22 IU/L (ref 0–32)
APPEARANCE UR: CLEAR
AST SERPL-CCNC: 29 IU/L (ref 0–40)
BACTERIA #/AREA URNS HPF: NORMAL /[HPF]
BASOPHILS # BLD AUTO: 0.1 X10E3/UL (ref 0–0.2)
BASOPHILS NFR BLD AUTO: 1 %
BILIRUB SERPL-MCNC: 0.6 MG/DL (ref 0–1.2)
BILIRUB UR QL STRIP: NEGATIVE
BUN SERPL-MCNC: 11 MG/DL (ref 8–27)
BUN/CREAT SERPL: 13 (ref 12–28)
CALCIUM SERPL-MCNC: 9.7 MG/DL (ref 8.7–10.3)
CASTS URNS QL MICRO: NORMAL /LPF
CHLORIDE SERPL-SCNC: 105 MMOL/L (ref 96–106)
CHOLEST SERPL-MCNC: 220 MG/DL (ref 100–199)
CHOLEST/HDLC SERPL: 3.9 RATIO (ref 0–4.4)
CO2 SERPL-SCNC: 23 MMOL/L (ref 20–29)
COLOR UR: YELLOW
CREAT SERPL-MCNC: 0.85 MG/DL (ref 0.57–1)
EOSINOPHIL # BLD AUTO: 0.1 X10E3/UL (ref 0–0.4)
EOSINOPHIL NFR BLD AUTO: 2 %
EPI CELLS #/AREA URNS HPF: NORMAL /HPF (ref 0–10)
ERYTHROCYTE [DISTWIDTH] IN BLOOD BY AUTOMATED COUNT: 12.4 % (ref 11.7–15.4)
GLOBULIN SER CALC-MCNC: 2.4 G/DL (ref 1.5–4.5)
GLUCOSE SERPL-MCNC: 106 MG/DL (ref 65–99)
GLUCOSE UR QL: NEGATIVE
HBA1C MFR BLD: 5.9 % (ref 4.8–5.6)
HCT VFR BLD AUTO: 43.4 % (ref 34–46.6)
HDLC SERPL-MCNC: 56 MG/DL
HGB BLD-MCNC: 15.1 G/DL (ref 11.1–15.9)
HGB UR QL STRIP: NEGATIVE
IMM GRANULOCYTES # BLD AUTO: 0 X10E3/UL (ref 0–0.1)
IMM GRANULOCYTES NFR BLD AUTO: 0 %
KETONES UR QL STRIP: NEGATIVE
LDLC SERPL CALC-MCNC: 147 MG/DL (ref 0–99)
LEUKOCYTE ESTERASE UR QL STRIP: ABNORMAL
LYMPHOCYTES # BLD AUTO: 2 X10E3/UL (ref 0.7–3.1)
LYMPHOCYTES NFR BLD AUTO: 34 %
MCH RBC QN AUTO: 29.9 PG (ref 26.6–33)
MCHC RBC AUTO-ENTMCNC: 34.8 G/DL (ref 31.5–35.7)
MCV RBC AUTO: 86 FL (ref 79–97)
MICRO URNS: ABNORMAL
MONOCYTES # BLD AUTO: 0.5 X10E3/UL (ref 0.1–0.9)
MONOCYTES NFR BLD AUTO: 8 %
NEUTROPHILS # BLD AUTO: 3.3 X10E3/UL (ref 1.4–7)
NEUTROPHILS NFR BLD AUTO: 55 %
NITRITE UR QL STRIP: NEGATIVE
PH UR STRIP: 7.5 [PH] (ref 5–7.5)
PLATELET # BLD AUTO: 329 X10E3/UL (ref 150–450)
POTASSIUM SERPL-SCNC: 4.6 MMOL/L (ref 3.5–5.2)
PROT SERPL-MCNC: 6.8 G/DL (ref 6–8.5)
PROT UR QL STRIP: NEGATIVE
RBC # BLD AUTO: 5.05 X10E6/UL (ref 3.77–5.28)
RBC #/AREA URNS HPF: NORMAL /HPF (ref 0–2)
SODIUM SERPL-SCNC: 141 MMOL/L (ref 134–144)
SP GR UR: 1.01 (ref 1–1.03)
TRIGL SERPL-MCNC: 95 MG/DL (ref 0–149)
UROBILINOGEN UR STRIP-MCNC: 0.2 MG/DL (ref 0.2–1)
VLDLC SERPL CALC-MCNC: 17 MG/DL (ref 5–40)
WBC # BLD AUTO: 6 X10E3/UL (ref 3.4–10.8)
WBC #/AREA URNS HPF: NORMAL /HPF (ref 0–5)

## 2021-12-20 ENCOUNTER — TELEPHONE (OUTPATIENT)
Dept: INTERNAL MEDICINE | Age: 71
End: 2021-12-20

## 2021-12-20 DIAGNOSIS — E78.5 HYPERLIPIDEMIA, UNSPECIFIED HYPERLIPIDEMIA TYPE: Primary | ICD-10-CM

## 2021-12-20 RX ORDER — ATORVASTATIN CALCIUM 10 MG/1
10 TABLET, FILM COATED ORAL DAILY
Qty: 90 TABLET | Refills: 1 | Status: SHIPPED | OUTPATIENT
Start: 2021-12-20 | End: 2022-06-19

## 2021-12-20 NOTE — TELEPHONE ENCOUNTER
Lm for pt to call office for lab results.   Ok for hub to read  ----- Message from DARREN Cook sent at 12/20/2021  7:11 AM EST -----  Results released to [facility]. Please call patient as well with results.      Sandi:     Here are the results of your labs from your visit.    Your CMP labs are all acceptable and stable compared to previous values. This is a measure of kidney function, electrolytes, and liver function.     Fasting blood sugar is elevated at 106. HgbA1c (which is a general assessment of your blood sugar over the past 3 months) is mildly elevated at 5.9%.  These values are consistent with prediabetes. Please continue to work on diet and weight loss. Reduce sweets and carb heavy foods, such as breads, pasta, certain fruits, rice, sugary drinks.     Vitamin D levels are good. Continue current dose of replacement.      Cholesterol levels are high. Your 10-year ASCVD risk (risk of heart attack,stroke, or peripheral vascular disease in the next 10 years) at this time is 20%, which is HIGH risk. ASCVD risk is calculated based on risk factors including age, gender, race, hypertension, diabetes and smoking history.  It is recommended to start statin therapy (medication for lowering cholesterol) once your ASCVD risk is 7.5% or greater OR your LDL is 190 or greater.   I would strongly recommend starting cholesterol medication to reduce risk of heart attack, stroke. I am going to send RX to your pharmacy for atorvastatin 10mg daily.   Recommend recheck fasting labs at your next office visit.     All other labs are within acceptable range. Please continue current medications as prescribed and follow up as scheduled.       Leeanna BANKS

## 2022-01-03 DIAGNOSIS — I10 HYPERTENSION, ESSENTIAL: ICD-10-CM

## 2022-01-03 RX ORDER — AMLODIPINE BESYLATE 2.5 MG/1
TABLET ORAL
Qty: 90 TABLET | Refills: 1 | Status: SHIPPED | OUTPATIENT
Start: 2022-01-03 | End: 2023-02-01 | Stop reason: SDUPTHER

## 2022-01-03 RX ORDER — AMLODIPINE BESYLATE 5 MG/1
5 TABLET ORAL DAILY
Qty: 90 TABLET | Refills: 1 | Status: SHIPPED | OUTPATIENT
Start: 2022-01-03 | End: 2023-02-01 | Stop reason: SDUPTHER

## 2022-02-21 ENCOUNTER — TELEPHONE (OUTPATIENT)
Dept: INTERNAL MEDICINE | Age: 72
End: 2022-02-21

## 2022-02-21 NOTE — TELEPHONE ENCOUNTER
Caller: Sandi Acevedo    Relationship: Self    Best call back number: [phone]     Who are you requesting to speak with (clinical staff, provider,  specific staff member): MA OR DOCTOR     What was the call regarding: PATIENT WOULD LIKE TO DISCUSS REFERRAL FOR REHAB FOR NERVE PAIN, PRO RE[facility]AB, [phone]    Do you require a callback: YES

## 2022-02-21 NOTE — TELEPHONE ENCOUNTER
Pt was told an appointment would have to be made before an referral could be place. She has an apt on 2/25/22

## 2022-02-24 ENCOUNTER — HOSPITAL ENCOUNTER (OUTPATIENT)
Dept: GENERAL RADIOLOGY | Facility: HOSPITAL | Age: 72
Discharge: HOME | End: 2022-02-24
Admitting: NURSE PRACTITIONER

## 2022-02-24 ENCOUNTER — OFFICE VISIT (OUTPATIENT)
Dept: INTERNAL MEDICINE | Age: 72
End: 2022-02-24

## 2022-02-24 VITALS
WEIGHT: 148.2 LBS | DIASTOLIC BLOOD PRESSURE: 80 MMHG | HEART RATE: 83 BPM | BODY MASS INDEX: 27.27 KG/M2 | OXYGEN SATURATION: 98 % | HEIGHT: 62 IN | SYSTOLIC BLOOD PRESSURE: 136 MMHG | TEMPERATURE: 97.1 F

## 2022-02-24 DIAGNOSIS — M54.50 LUMBAR PAIN: ICD-10-CM

## 2022-02-24 DIAGNOSIS — R82.998 URINE LEUKOCYTES: ICD-10-CM

## 2022-02-24 DIAGNOSIS — M54.32 LEFT SIDED SCIATICA: Primary | ICD-10-CM

## 2022-02-24 LAB
BILIRUB BLD-MCNC: NEGATIVE MG/DL
CLARITY, POC: CLEAR
COLOR UR: YELLOW
EXPIRATION DATE: ABNORMAL
GLUCOSE UR STRIP-MCNC: NEGATIVE MG/DL
KETONES UR QL: NEGATIVE
LEUKOCYTE EST, POC: ABNORMAL
Lab: ABNORMAL
NITRITE UR-MCNC: NEGATIVE MG/ML
PH UR: 5.5 [PH] (ref 5–8)
PROT UR STRIP-MCNC: NEGATIVE MG/DL
RBC # UR STRIP: NEGATIVE /UL
SP GR UR: 1.02 (ref 1–1.03)
UROBILINOGEN UR QL: NORMAL

## 2022-02-24 PROCEDURE — 72100 X-RAY EXAM L-S SPINE 2/3 VWS: CPT

## 2022-02-24 PROCEDURE — 99213 OFFICE O/P EST LOW 20 MIN: CPT | Performed by: NURSE PRACTITIONER

## 2022-02-24 PROCEDURE — 81003 URINALYSIS AUTO W/O SCOPE: CPT | Performed by: NURSE PRACTITIONER

## 2022-02-24 RX ORDER — METHYLPREDNISOLONE 4 MG/1
TABLET ORAL
Qty: 1 EACH | Refills: 0 | Status: SHIPPED | OUTPATIENT
Start: 2022-02-24 | End: 2022-06-17

## 2022-02-24 RX ORDER — CYCLOBENZAPRINE HCL 5 MG
5 TABLET ORAL 3 TIMES DAILY PRN
Qty: 15 TABLET | Refills: 0 | Status: SHIPPED | OUTPATIENT
Start: 2022-02-24 | End: 2022-03-04

## 2022-02-24 NOTE — PROGRESS NOTES
"    I N T E R N A L  M E D I C I N E  JANA BORGES, APRN      ENCOUNTER DATE:  02/24/2022    Sandi Acevedo / 71 y.o. / female      CHIEF COMPLAINT / REASON FOR OFFICE VISIT     Sciatica (L sided, x1 month)      ASSESSMENT & PLAN     1. Left sided sciatica  - medrol pack   - XR Spine Lumbar 2 or 3 View  - Ambulatory Referral to Physical Therapy Evaluate and treat    2. Lumbar pain  - flexeril as needed   - XR Spine Lumbar 2 or 3 View  - Ambulatory Referral to Physical Therapy Evaluate and treat    3. Urine leukocytes  - Urinalysis With Culture If Indicated - Urine, Clean Catch  - POCT urinalysis dipstick, automated    Orders Placed This Encounter   Procedures   • XR Spine Lumbar 2 or 3 View   • Urinalysis With Culture If Indicated - Urine, Clean Catch   • Ambulatory Referral to Physical Therapy Evaluate and treat   • POCT urinalysis dipstick, automated     New Medications Ordered This Visit   Medications   • methylPREDNISolone (MEDROL) 4 MG dose pack     Sig: Take as directed on package instructions.     Dispense:  1 each     Refill:  0   • cyclobenzaprine (FLEXERIL) 5 MG tablet     Sig: Take 1 tablet by mouth 3 (Three) Times a Day As Needed for Muscle Spasms.     Dispense:  15 tablet     Refill:  0       SUMMARY/DISCUSSION  • Follow-up as needed and as scheduled      Next Appointment with me: 6/17/2022    No follow-ups on file.      VITAL SIGNS     Visit Vitals  /80   Pulse 83   Temp 97.1 °F (36.2 °C) (Temporal)   Ht 157.5 cm (62.01\")   Wt 67.2 kg (148 lb 3.2 oz)   LMP  (LMP Unknown)   SpO2 98%   BMI 27.10 kg/m²       Wt Readings from Last 3 Encounters:   02/24/22 67.2 kg (148 lb 3.2 oz)   12/17/21 65.3 kg (144 lb)   06/11/21 64.4 kg (142 lb)     Body mass index is 27.1 kg/m².      MEDICATIONS AT THE TIME OF OFFICE VISIT     Current Outpatient Medications on File Prior to Visit   Medication Sig   • amLODIPine (NORVASC) 2.5 MG tablet TAKE 1 TABLET BY MOUTH EVERY DAY   • amLODIPine (NORVASC) 5 MG tablet TAKE 1 " TABLET BY MOUTH DAILY. TAKES IN COMBINATION WITH 2.5MG FOR TOTAL 7.5MG DAILY.   • ASPIRIN 81 PO Take 1 tablet by mouth Daily.   • atorvastatin (LIPITOR) 10 MG tablet Take 1 tablet by mouth Daily.   • calcium carbonate (OS-ROCIO) 600 MG tablet Take 600 mg by mouth daily.   • cholecalciferol (VITAMIN D3) 1000 UNITS tablet Take 1,000 Units by mouth daily.   • Flaxseed, Linseed, (FLAXSEED OIL PO) Take  by mouth.   • fluticasone (FLONASE) 50 MCG/ACT nasal spray 2 sprays by Each Nare route Daily.   • Probiotic Product (PROBIOTIC DAILY PO) Take  by mouth.   • vitamin E 1000 UNIT capsule Take 1,000 Units by mouth Daily.   • Zinc 50 MG capsule Take  by mouth.   • LORazepam (Ativan) 0.5 MG tablet Take 1 tablet by mouth Every 8 (Eight) Hours As Needed for Anxiety.   • mometasone (ELOCON) 0.1 % ointment      No current facility-administered medications on file prior to visit.         HISTORY OF PRESENT ILLNESS        Sciatica of left side  Presents with main concern of sciatica radiating down her left leg.  She denies any numbness or tingling or any loss of bladder or bowel.  Pain radiates from her left lumbar hip to foot.  She has taken Tylenol with minimal relief.  She does feel some back tightness and spasms. She does have some tenderness in her left lumbar area minimal decreased range of motion due to pain.  No urinary incontinence.  No weakness of the lower extremities.  Seeking return to physical therapy.     REVIEW OF SYSTEMS     Constitutional neg except per HPI   Resp neg  CV neg  Musc left lumbar pain radiating left leg     PHYSICAL EXAMINATION     Physical Exam  Constitutional  No distress  Musc left lumbar tenderness; equal lower extremity strength   Neuro full sensation; +2 patellar pulse     REVIEWED DATA     Labs:   Lab Results   Component Value Date    GLUCOSE 106 (H) 12/17/2021    BUN 11 12/17/2021    CREATININE 0.85 12/17/2021    EGFRIFNONA 68 12/17/2021    EGFRIFAFRI 79 12/17/2021    BCR 13 12/17/2021    K 4.6  12/17/2021    CO2 23 12/17/2021    CALCIUM 9.7 12/17/2021    PROTENTOTREF 6.8 12/17/2021    ALBUMIN 4.4 12/17/2021    LABIL2 1.8 12/17/2021    AST 29 12/17/2021    ALT 22 12/17/2021     Lab Results   Component Value Date    HGBA1C 5.9 (H) 12/17/2021     Brief Urine Lab Results  (Last result in the past 365 days)      Color   Clarity   Blood   Leuk Est   Nitrite   Protein   CREAT   Urine HCG        02/24/22 1452 Yellow   Clear   Negative   Trace   Negative   Negative                 Imaging:           Medical Tests:             Summary of old records / correspondence / consultant report:           Request outside records:           *Examiner was wearing medical surgical mask, face shield and exam gloves during the entire duration of the visit. Patient was masked the entire time.   Minimum social distance of 6 ft maintained entire visit except if physical contact was necessary as documented.     Dictated utilizing Dragon dictation

## 2022-02-25 ENCOUNTER — TELEPHONE (OUTPATIENT)
Dept: INTERNAL MEDICINE | Age: 72
End: 2022-02-25

## 2022-02-25 NOTE — TELEPHONE ENCOUNTER
----- Message from DARREN Higuera sent at 2/24/2022  5:35 PM EST -----  X-ray of your lumbar spine shows bone spurs along with disc space narrowing in your lumbar lower back.  Lets proceed with physical therapy continue muscle relaxer and steroid.  Please let me know if your symptoms do not improve we can pursue further avenues.

## 2022-02-26 LAB
APPEARANCE UR: CLEAR
BACTERIA #/AREA URNS HPF: NORMAL /[HPF]
BACTERIA UR CULT: NORMAL
BACTERIA UR CULT: NORMAL
BILIRUB UR QL STRIP: NEGATIVE
CASTS URNS QL MICRO: NORMAL /LPF
COLOR UR: YELLOW
EPI CELLS #/AREA URNS HPF: NORMAL /HPF (ref 0–10)
GLUCOSE UR QL STRIP: NEGATIVE
HGB UR QL STRIP: NEGATIVE
KETONES UR QL STRIP: NEGATIVE
LEUKOCYTE ESTERASE UR QL STRIP: ABNORMAL
MICRO URNS: ABNORMAL
NITRITE UR QL STRIP: NEGATIVE
PH UR STRIP: 5.5 [PH] (ref 5–7.5)
PROT UR QL STRIP: NEGATIVE
RBC #/AREA URNS HPF: NORMAL /HPF (ref 0–2)
SP GR UR STRIP: 1.02 (ref 1–1.03)
URINALYSIS REFLEX: ABNORMAL
UROBILINOGEN UR STRIP-MCNC: 0.2 MG/DL (ref 0.2–1)
WBC #/AREA URNS HPF: NORMAL /HPF (ref 0–5)

## 2022-03-01 ENCOUNTER — TELEPHONE (OUTPATIENT)
Dept: INTERNAL MEDICINE | Age: 72
End: 2022-03-01

## 2022-03-01 NOTE — TELEPHONE ENCOUNTER
HUB MAY READ TO PT    ----- Message from DARREN Higuera sent at 2/28/2022  5:00 PM EST -----  No growth of bacteria on urine culture.  No UTI.

## 2022-03-04 ENCOUNTER — TELEPHONE (OUTPATIENT)
Dept: INTERNAL MEDICINE | Age: 72
End: 2022-03-04

## 2022-03-04 RX ORDER — CYCLOBENZAPRINE HCL 5 MG
TABLET ORAL
Qty: 15 TABLET | Refills: 0 | Status: SHIPPED | OUTPATIENT
Start: 2022-03-04 | End: 2022-12-30

## 2022-03-04 NOTE — TELEPHONE ENCOUNTER
Caller: Sandi Acevedo    Relationship to patient: Self    Best call back number: [phone]    Patient is needing: PATIENT IS CALLING TO STATE AFTER SHE STARTED TAKING THE FOLLOWING MEDICATION, SHE STARTED GETTING A HEADACHE, AND COUGHING.  SHE STATES SHE IS ALLERGIC TO ANY MEDICATION ENDING IN ( PRIL ) AND WANTS TO MAKE SURE SHE IS NOT ALLERGIC TO THIS MEDICATION.  SHE IS ASKING IF SHE SHOULD DISCONTINUE THE MEDICATION AND GET A DIFFERENT ONE PRESCRIBED.      cyclobenzaprine (FLEXERIL) 5 MG tablet     [facility]/pharm[facility] #[id number] - [place], [place] - [address] - [phone]  - [phone]   [phone]    PLEASE ADVISE.

## 2022-05-06 ENCOUNTER — TELEPHONE (OUTPATIENT)
Dept: INTERNAL MEDICINE | Age: 72
End: 2022-05-06

## 2022-06-17 ENCOUNTER — OFFICE VISIT (OUTPATIENT)
Dept: INTERNAL MEDICINE | Age: 72
End: 2022-06-17

## 2022-06-17 VITALS
SYSTOLIC BLOOD PRESSURE: 140 MMHG | HEIGHT: 62 IN | DIASTOLIC BLOOD PRESSURE: 80 MMHG | OXYGEN SATURATION: 98 % | WEIGHT: 144.8 LBS | TEMPERATURE: 97.1 F | BODY MASS INDEX: 26.65 KG/M2 | HEART RATE: 81 BPM

## 2022-06-17 DIAGNOSIS — M85.80 OSTEOPENIA, UNSPECIFIED LOCATION: ICD-10-CM

## 2022-06-17 DIAGNOSIS — R73.01 IMPAIRED FASTING GLUCOSE: ICD-10-CM

## 2022-06-17 DIAGNOSIS — Z79.899 LONG TERM CURRENT USE OF THERAPEUTIC DRUG: ICD-10-CM

## 2022-06-17 DIAGNOSIS — E78.00 PURE HYPERCHOLESTEROLEMIA: ICD-10-CM

## 2022-06-17 DIAGNOSIS — E55.9 VITAMIN D DEFICIENCY: ICD-10-CM

## 2022-06-17 DIAGNOSIS — Z00.00 MEDICARE ANNUAL WELLNESS VISIT, SUBSEQUENT: Primary | ICD-10-CM

## 2022-06-17 DIAGNOSIS — E78.5 HYPERLIPIDEMIA, UNSPECIFIED HYPERLIPIDEMIA TYPE: ICD-10-CM

## 2022-06-17 DIAGNOSIS — I10 PRIMARY HYPERTENSION: ICD-10-CM

## 2022-06-17 DIAGNOSIS — F41.9 ANXIETY: ICD-10-CM

## 2022-06-17 DIAGNOSIS — R73.9 HYPERGLYCEMIA: ICD-10-CM

## 2022-06-17 DIAGNOSIS — Z78.0 POSTMENOPAUSAL: ICD-10-CM

## 2022-06-17 PROCEDURE — G0439 PPPS, SUBSEQ VISIT: HCPCS | Performed by: NURSE PRACTITIONER

## 2022-06-17 PROCEDURE — 1170F FXNL STATUS ASSESSED: CPT | Performed by: NURSE PRACTITIONER

## 2022-06-17 PROCEDURE — 1159F MED LIST DOCD IN RCRD: CPT | Performed by: NURSE PRACTITIONER

## 2022-06-17 PROCEDURE — 1126F AMNT PAIN NOTED NONE PRSNT: CPT | Performed by: NURSE PRACTITIONER

## 2022-06-17 RX ORDER — LATANOPROST 50 UG/ML
SOLUTION/ DROPS OPHTHALMIC
COMMUNITY
Start: 2022-03-11

## 2022-06-17 NOTE — PROGRESS NOTES
"    I N T E R N A L  M E D I C I N E  JANA BORGES, APRN      ENCOUNTER DATE:  06/17/2022    Sandi SANDRINE Acevedo / 71 y.o. / female        MEDICARE ANNUAL WELLNESS VISIT       Chief Complaint: Medicare Wellness-subsequent, Hyperlipidemia, and Hypertension       Patient's general assessment of her health since a year ago:     - Compared to one year ago, she feels her physical health is about the same without significant change.    - Compared to one year ago, she feels her mental health is about the same without significant change.      HPI for other active medical problems:     Indeterminate right breast asymmetries were seen on last mammogram, patient declines further screening, discussed risk of potential malignancy patient still declines.  Acceptable for order for DEXA scan.  2019 showed osteopenia.  On calcium and vitamin D supplementation.  Has stopped Pap smear.  Cologjohn completed 2019 December, negative and up-to-date at this time.    Hypertension: Elevated at 140/80 today in office, well controlled in the 130s over 70s low end with amlodipine. Denies any chest pain, shortness of air, headache, visual disturbances, lower extremity leg swelling, or heart palpitations.     Hyperlipidemia: Controlled with atorvastatin 10 mg daily.  Denies any myalgias with medication.    Anxiety: Rare use of Ativan less than 1 times monthly, for increased anxiety.    Impaired fasting glucose last hemoglobin A1c 5.9.  No medication treatment at this time.     * The required components of Health Risk Assessment (HRA) that were completed by the patient and/or my staff are contained within this note and in the scanned documents titled \"Health Risk Assessment\" within the media section of the patient's chart in [employer].         HISTORY       Recent Hospitalizations:    Recent hospitalization?: No    If YES, location, date, and diagnoses:     · Location:   · Date:   · Principle Discharge Dx:   · Secondary Dx:       Patient Care " Team:    Patient Care Team:  Arun San APRN as PCP - General (Internal Medicine)  Eddie Sarah MD as Consulting Physician (Ophthalmology)  Jordon Rivera DPM as Consulting Physician (Podiatry)      Allergies:  Benazepril    Medications:  Current Outpatient Medications on File Prior to Visit   Medication Sig Dispense Refill   • amLODIPine (NORVASC) 2.5 MG tablet TAKE 1 TABLET BY MOUTH EVERY DAY 90 tablet 1   • amLODIPine (NORVASC) 5 MG tablet TAKE 1 TABLET BY MOUTH DAILY. TAKES IN COMBINATION WITH 2.5MG FOR TOTAL 7.5MG DAILY. 90 tablet 1   • ASPIRIN 81 PO Take 1 tablet by mouth Daily.     • calcium carbonate (OS-ROCIO) 600 MG tablet Take 600 mg by mouth daily.     • cholecalciferol (VITAMIN D3) 1000 UNITS tablet Take 1,000 Units by mouth daily.     • Flaxseed, Linseed, (FLAXSEED OIL PO) Take  by mouth.     • fluticasone (FLONASE) 50 MCG/ACT nasal spray 2 sprays by Each Nare route Daily. 48 mL 2   • Probiotic Product (PROBIOTIC DAILY PO) Take  by mouth.     • vitamin E 1000 UNIT capsule Take 1,000 Units by mouth Daily.     • Zinc 50 MG capsule Take  by mouth.     • atorvastatin (LIPITOR) 10 MG tablet Take 1 tablet by mouth Daily. 90 tablet 1   • cyclobenzaprine (FLEXERIL) 5 MG tablet TAKE 1 TABLET BY MOUTH 3 TIMES A DAY AS NEEDED FOR MUSCLE SPASMS. 15 tablet 0   • latanoprost (XALATAN) 0.005 % ophthalmic solution INSTIL 1 DROP IN EYE(S) AT BEDTIME     • LORazepam (Ativan) 0.5 MG tablet Take 1 tablet by mouth Every 8 (Eight) Hours As Needed for Anxiety. 30 tablet 0   • mometasone (ELOCON) 0.1 % ointment      • [DISCONTINUED] methylPREDNISolone (MEDROL) 4 MG dose pack Take as directed on package instructions. 1 each 0     No current facility-administered medications on file prior to visit.        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[facility])   • Low back pain   • Carotid stenosis mild ( 2/2012)   • Blepharitis of both eyes   • Lichen sclerosus (GYN = isabell Garza)   • Agoraphobia   • Urinary incontinence in female   • Squamous cell carcinoma   • Osteopenia   • FH: uterine cancer   • FH: CAD (coronary artery disease)   • Vitamin D deficiency disease   • Postmenopausal   • Panic anxiety syndrome (Xanax)   • Hayfever   • Osteoarthritis   • Routine health maintenance   • Cough due to ACE inhibitor   • COPD (chronic obstructive pulmonary disease) ([facility])   • Pure hypercholesterolemia   • Palpitations   • Hyperlipidemia   • Subclinical hypothyroidism   • Plantar fasciitis of left foot   • Sciatica of left side       Past Medical History:  Past Medical History:   Diagnosis Date   • Allergic    • Anxiety    • Arthritis    • Cataract    • COPD (chronic obstructive pulmonary disease) ([facility])     Mild obstructive defect noted 2017   • Emphysema of lung ([facility])    • Hypertension    • Low back pain    • Osteopenia    • Plantar fasciitis of left foot 07/2018   • Urinary tract infection    • Visual impairment        Past Surgical History:  Past Surgical History:   Procedure Laterality Date   • CYST REMOVAL Right 12/23/2015    Neck- Dorota Bauer   • EYE SURGERY Left 01/01/1955    to repair lazy eye   • LAPAROSCOPIC TUBAL LIGATION Bilateral 1986   • TUBAL ABDOMINAL LIGATION         Social History:  Social History     Socioeconomic History   • Marital status:      Spouse name: none   • Number of children: 2   • Years of education: 12    Tobacco Use   • Smoking status: Never Smoker   • Smokeless tobacco: Never Used   Vaping Use   • Vaping Use: Never used   Substance and Sexual Activity   • Alcohol use: No   • Drug use: No   • Sexual activity: Never     Partners: Male       Family  "History:  Family History   Problem Relation Age of Onset   • Arthritis Mother            • Cancer Mother         Esophagus   • Glaucoma Mother    • Hypertension Mother            • Stroke Mother            • Osteoporosis Mother    • Ovarian cancer Mother    • Heart disease Mother         Congestive heart   • Heart disease Father    • Stroke Father            • Early death Father         Earnestine medinaragdarrell   • Hypertension Father            • Thyroid disease Sister    • Hyperlipidemia Brother    • Hypertension Brother    • Asthma Daughter    • Heart disease Maternal Aunt    • Asthma Daughter         She is on meds   • Heart disease Maternal Aunt         Congestive heart   • Heart disease Paternal Aunt         Stroke   • Hyperlipidemia Brother         On meds   • Hypertension Brother    • Stroke Maternal Aunt         On meds   • Thyroid disease Sister         On meds         PATIENT ASSESSMENT     Vitals:  /80   Pulse 81   Temp 97.1 °F (36.2 °C) (Temporal)   Ht 157.5 cm (62.01\")   Wt 65.7 kg (144 lb 12.8 oz)   LMP  (LMP Unknown)   SpO2 98%   BMI 26.48 kg/m²   BP Readings from Last 3 Encounters:   22 140/80   22 136/80   21 140/90     Wt Readings from Last 3 Encounters:   22 65.7 kg (144 lb 12.8 oz)   22 67.2 kg (148 lb 3.2 oz)   21 65.3 kg (144 lb)      Body mass index is 26.48 kg/m².    Pain Score    22 0859   PainSc: 0-No pain         Review of Systems:    Review of Systems  Constitutional neg except per HPI   Resp neg  CV neg    Physical Exam:    Physical Exam  Constitutional  No distress  Cardiovascular Rate  normal . Rhythm: regular . Heart sounds:  normal  Pulmonary/Chest  Effort normal. Breath sounds:  normal  Psychiatric  Alert. Judgment and thought content normal. Mood normal     Reviewed Data:    Labs:   Lab Results   Component Value Date     2021    K 4.6 2021    CALCIUM 9.7 2021    AST 29 " 12/17/2021    ALT 22 12/17/2021    BUN 11 12/17/2021    CREATININE 0.85 12/17/2021    CREATININE 0.80 06/11/2021    CREATININE 0.92 10/15/2020    EGFRIFNONA 68 12/17/2021    EGFRIFAFRI 79 12/17/2021       Lab Results   Component Value Date    HGBA1C 5.9 (H) 12/17/2021       Lab Results   Component Value Date     (H) 12/17/2021     (H) 06/11/2021     (H) 10/15/2020    HDL 56 12/17/2021    TRIG 95 12/17/2021    CHOLHDLRATIO 3.9 12/17/2021       Lab Results   Component Value Date    TSH 2.060 10/15/2020    FREET4 1.20 10/15/2020          Lab Results   Component Value Date    WBC 6.0 12/17/2021    HGB 15.1 12/17/2021    HGB 14.3 12/19/2018    HGB 14.4 10/25/2017     12/17/2021                 No results found for: PSA    Imaging:          Medical Tests:          Screening for Glaucoma:  Previous screening for glaucoma?: Yes      Hearing Loss Screen:  Finger Rub Hearing Test (right ear): passed  Finger Rub Hearing Test (left ear): passed      Urinary Incontinence Screen:  Episodes of urinary incontinence? : No      Depression Screen:  PHQ-2/PHQ-9 Depression Screening 6/17/2022   Retired PHQ-9 Total Score -   Retired Total Score -   Little Interest or Pleasure in Doing Things 0-->not at all   Feeling Down, Depressed or Hopeless 0-->not at all   PHQ-9: Brief Depression Severity Measure Score 0        PHQ-2: 0 (Not depressed)    PHQ-9: 0 (Negative screening for depression)       FUNCTIONAL, FALL RISK, & COGNITIVE SCREENING (Components below):    DATA:    Functional & Cognitive Status 6/17/2022   Do you have difficulty preparing food and eating? No   Do you have difficulty bathing yourself, getting dressed or grooming yourself? No   Do you have difficulty using the toilet? No   Do you have difficulty moving around from place to place? No   Do you have trouble with steps or getting out of a bed or a chair? No   Current Diet Well Balanced Diet   Dental Exam Up to date   Eye Exam Up to date    Exercise (times per week) 4 times per week   Current Exercises Include Swim Aerobics Class   Current Exercise Activities Include -   Do you need help using the phone?  No   Are you deaf or do you have serious difficulty hearing?  No   Do you need help with transportation? No   Do you need help shopping? No   Do you need help preparing meals?  No   Do you need help with housework?  No   Do you need help with laundry? No   Do you need help taking your medications? No   Do you need help managing money? No   Do you ever drive or ride in a car without wearing a seat belt? Yes   Do you have difficulty concentrating, remembering or making decisions? -         A) Assessment of Functional Ability:  (Assessment of ability to perform ADL's (showering/bathing, using toilet, dressing, feeding self, moving self around) and IADL's (use telephone, shop, prepare food, housekeep, do laundry, transport independently, take medications independently, and handle finances)    Degree of functional impairment: MILD (based on assessment noted above)      B) Assessment of Fall Risk:  Fall Risk Assessment was completed, and patient is at low risk for falls.       Need for further evaluation of gait, strength, and balance? : No    Timed Up and Go (TUG):   (>= 12 seconds indicates high risk for falling)    Observable abnormalities included: Normal gait pattern       C. Assessment of Cognitive Function:    Mini-Cog Test:     1) Registration (3 objects): Yes   2) Number of objects recalled: 3   3) Clock Draw: Passed? : Yes       Further evaluation required? : No        COUNSELING       A. Identification of Health Risk Factors:    Risk factors include: cardiovascular risk factors      B. Age-Appropriate Screening Schedule:  (Refer to the list below for future screening recommendations based on patient's age, sex and/or medical conditions. Orders for these recommended tests are listed in the plan section. The patient has been provided with a  written plan)    Health Maintenance Topics  Health Maintenance   Topic Date Due   • ZOSTER VACCINE (2 of 2) 06/23/2017   • TDAP/TD VACCINES (2 - Td or Tdap) 06/18/2018   • DXA SCAN  12/04/2021   • INFLUENZA VACCINE  10/01/2022   • LIPID PANEL  12/17/2022   • MAMMOGRAM  07/02/2023       Health Maintenance Topics Due or Over-Due  Health Maintenance Due   Topic Date Due   • ZOSTER VACCINE (2 of 2) 06/23/2017   • TDAP/TD VACCINES (2 - Td or Tdap) 06/18/2018   • DXA SCAN  12/04/2021         C. Advanced Care Planning:    Advance Care Planning   ACP discussion was held with the patient during this visit. Patient has an advance directive (not in EMR), copy requested.       D. Patient Self-Management and Personalized Health Advice:    She has been provided with personalized counseling/information (including brochures/handouts) about:     -- optimizing diet/nutrition plans, improving exercise / conditioning, weight management and reducing risk for cardiovascular disease (heart, stroke, vascular)      She has been recommended for the following preventative services which has been performed today, will be ordered today or ordered/performed on upcoming follow-up visit:     -- NUTRITION counseling provided, EXERCISE counseling provided, EYE exam for glaucoma screening recommended, CARDIOVASCULAR disease risk reduction counseling performed, FALL RISK assessment / plan of care completed, URINARY incontinence assessment done, BREAST CANCER screening DISCUSSED, **COLORECTAL cancer screening (colonoscopy/Cologuard discussed or ordered), vaccination for Tdap / Td administered (or recommended), vaccination for SHINGRIX administered  (or recommended)      E. Miscellaneous Items:    -Aspirin use counseling: Does not need ASA (and currently is not on it)    -Discussed BMI with her. The BMI is above average; BMI management plan is completed (discussed plans for weight loss)    -Reviewed use of high risk medication in the elderly:  YES    -Reviewed for potential of harmful drug interactions in the elderly: YES        WRAP UP       Assessment & Plan:  1) MEDICARE ANNUAL WELLNESS VISIT    2) OTHER MEDICAL CONDITIONS ADDRESSED TODAY:            Problem List Items Addressed This Visit        Cardiac and Vasculature    Pure hypercholesterolemia    Relevant Medications    atorvastatin (LIPITOR) 10 MG tablet    Hyperlipidemia    Relevant Medications    atorvastatin (LIPITOR) 10 MG tablet    Other Relevant Orders    Lipid Panel With / Chol / HDL Ratio       Genitourinary and Reproductive     Postmenopausal    Relevant Orders    DEXA Bone Density Axial       Musculoskeletal and Injuries    Osteopenia    Relevant Orders    Vitamin D 25 Hydroxy      Other Visit Diagnoses     Medicare annual wellness visit, subsequent    -  Primary    Anxiety        Impaired fasting glucose        Long term current use of therapeutic drug        Relevant Orders    Compliance Drug Analysis, Ur - Urine, Clean Catch    Hyperglycemia        Relevant Orders    Hemoglobin A1c    Primary hypertension        Relevant Orders    Urinalysis With Culture If Indicated - Urine, Clean Catch    CBC & Differential    Comprehensive Metabolic Panel    TSH+Free T4    Vitamin D deficiency        Relevant Orders    Vitamin D 25 Hydroxy                    Orders Placed This Encounter   Procedures   • DEXA Bone Density Axial   • Compliance Drug Analysis, Ur - Urine, Clean Catch   • Urinalysis With Culture If Indicated - Urine, Clean Catch   • Comprehensive Metabolic Panel   • Hemoglobin A1c   • Lipid Panel With / Chol / HDL Ratio   • TSH+Free T4   • Vitamin D 25 Hydroxy   • CBC & Differential       Discussion / Summary:  · Continue current medication regimen for hypertension, hyperlipidemia and anxiety.  Controlled substance contract updated today along with urine drug compliance, Carlos reviewed.  · Decrease/eliminate soda, caffeine, alcohol and overall caloric intake. Reduce carbohydrates and  sweets in diet.  Continue to improve dietary habits with lean proteins, fresh vegetables, fruits, and nuts. Improve aerobic exercise: walking/biking/swimming daily as tolerated, recommend 30 minutes/day at least 5 days/week.  · Patient continues to decline mammogram although last mammogram was suspicious.  Acceptable to DEXA, ordered.  Continue vitamin D supplement  · Tetanus and shingles recommended at pharmacy  · Follow-up in 6 months for chronic medical, earlier if needed      Medications as of TODAY:              Current Outpatient Medications   Medication Sig Dispense Refill   • amLODIPine (NORVASC) 2.5 MG tablet TAKE 1 TABLET BY MOUTH EVERY DAY 90 tablet 1   • amLODIPine (NORVASC) 5 MG tablet TAKE 1 TABLET BY MOUTH DAILY. TAKES IN COMBINATION WITH 2.5MG FOR TOTAL 7.5MG DAILY. 90 tablet 1   • ASPIRIN 81 PO Take 1 tablet by mouth Daily.     • calcium carbonate (OS-ROCIO) 600 MG tablet Take 600 mg by mouth daily.     • cholecalciferol (VITAMIN D3) 1000 UNITS tablet Take 1,000 Units by mouth daily.     • Flaxseed, Linseed, (FLAXSEED OIL PO) Take  by mouth.     • fluticasone (FLONASE) 50 MCG/ACT nasal spray 2 sprays by Each Nare route Daily. 48 mL 2   • Probiotic Product (PROBIOTIC DAILY PO) Take  by mouth.     • vitamin E 1000 UNIT capsule Take 1,000 Units by mouth Daily.     • Zinc 50 MG capsule Take  by mouth.     • atorvastatin (LIPITOR) 10 MG tablet Take 1 tablet by mouth Daily. 90 tablet 1   • cyclobenzaprine (FLEXERIL) 5 MG tablet TAKE 1 TABLET BY MOUTH 3 TIMES A DAY AS NEEDED FOR MUSCLE SPASMS. 15 tablet 0   • latanoprost (XALATAN) 0.005 % ophthalmic solution INSTIL 1 DROP IN EYE(S) AT BEDTIME     • LORazepam (Ativan) 0.5 MG tablet Take 1 tablet by mouth Every 8 (Eight) Hours As Needed for Anxiety. 30 tablet 0   • mometasone (ELOCON) 0.1 % ointment        No current facility-administered medications for this visit.         FOLLOW-UP:            Return in about 6 months (around 12/17/2022) for Next scheduled  follow up establish care 30 minute .                 Future Appointments   Date Time Provider Department Center   12/30/2022 10:15 AM Arun San APRN [facility]           After Visit Summary (AVS) including the Personalized Prevention  Plan Services (PPPS) was either printed and given to the patient at check-out today and/or sent to [facility] for review.       *Examiner was wearing medical surgical mask, face shield and exam gloves during the entire duration of the visit. Patient was masked the entire time.   Minimum social distance of 6 ft maintained entire visit except if physical contact was necessary as documented.      **Dragon Disclaimer:   Much of this encounter note is an electronic transcription/translation of spoken language to printed text. The electronic translation of spoken language may permit erroneous, or at times, nonsensical words or phrases to be inadvertently transcribed. Although I have reviewed the note for such errors, some may still exist.

## 2022-06-19 LAB
25(OH)D3+25(OH)D2 SERPL-MCNC: 48.4 NG/ML (ref 30–100)
ALBUMIN SERPL-MCNC: 4.6 G/DL (ref 3.7–4.7)
ALBUMIN/GLOB SERPL: 2.2 {RATIO} (ref 1.2–2.2)
ALP SERPL-CCNC: 109 IU/L (ref 44–121)
ALT SERPL-CCNC: 19 IU/L (ref 0–32)
APPEARANCE UR: CLEAR
AST SERPL-CCNC: 32 IU/L (ref 0–40)
BACTERIA #/AREA URNS HPF: ABNORMAL /[HPF]
BACTERIA UR CULT: NORMAL
BACTERIA UR CULT: NORMAL
BASOPHILS # BLD AUTO: 0.1 X10E3/UL (ref 0–0.2)
BASOPHILS NFR BLD AUTO: 1 %
BILIRUB SERPL-MCNC: 0.8 MG/DL (ref 0–1.2)
BILIRUB UR QL STRIP: NEGATIVE
BUN SERPL-MCNC: 16 MG/DL (ref 8–27)
BUN/CREAT SERPL: 17 (ref 12–28)
CALCIUM SERPL-MCNC: 9.5 MG/DL (ref 8.7–10.3)
CASTS URNS QL MICRO: ABNORMAL /LPF
CHLORIDE SERPL-SCNC: 104 MMOL/L (ref 96–106)
CHOLEST SERPL-MCNC: 214 MG/DL (ref 100–199)
CHOLEST/HDLC SERPL: 3.9 RATIO (ref 0–4.4)
CO2 SERPL-SCNC: 20 MMOL/L (ref 20–29)
COLOR UR: YELLOW
CREAT SERPL-MCNC: 0.95 MG/DL (ref 0.57–1)
EGFRCR SERPLBLD CKD-EPI 2021: 63 ML/MIN/1.73
EOSINOPHIL # BLD AUTO: 0.1 X10E3/UL (ref 0–0.4)
EOSINOPHIL NFR BLD AUTO: 2 %
EPI CELLS #/AREA URNS HPF: >10 /HPF (ref 0–10)
ERYTHROCYTE [DISTWIDTH] IN BLOOD BY AUTOMATED COUNT: 12.7 % (ref 11.7–15.4)
GLOBULIN SER CALC-MCNC: 2.1 G/DL (ref 1.5–4.5)
GLUCOSE SERPL-MCNC: 104 MG/DL (ref 65–99)
GLUCOSE UR QL STRIP: NEGATIVE
HBA1C MFR BLD: 6 % (ref 4.8–5.6)
HCT VFR BLD AUTO: 45.6 % (ref 34–46.6)
HDLC SERPL-MCNC: 55 MG/DL
HGB BLD-MCNC: 15.2 G/DL (ref 11.1–15.9)
HGB UR QL STRIP: NEGATIVE
IMM GRANULOCYTES # BLD AUTO: 0 X10E3/UL (ref 0–0.1)
IMM GRANULOCYTES NFR BLD AUTO: 0 %
KETONES UR QL STRIP: NEGATIVE
LDLC SERPL CALC-MCNC: 139 MG/DL (ref 0–99)
LEUKOCYTE ESTERASE UR QL STRIP: ABNORMAL
LYMPHOCYTES # BLD AUTO: 1.7 X10E3/UL (ref 0.7–3.1)
LYMPHOCYTES NFR BLD AUTO: 27 %
MCH RBC QN AUTO: 29.7 PG (ref 26.6–33)
MCHC RBC AUTO-ENTMCNC: 33.3 G/DL (ref 31.5–35.7)
MCV RBC AUTO: 89 FL (ref 79–97)
MICRO URNS: ABNORMAL
MONOCYTES # BLD AUTO: 0.6 X10E3/UL (ref 0.1–0.9)
MONOCYTES NFR BLD AUTO: 10 %
NEUTROPHILS # BLD AUTO: 4 X10E3/UL (ref 1.4–7)
NEUTROPHILS NFR BLD AUTO: 60 %
NITRITE UR QL STRIP: NEGATIVE
PH UR STRIP: 7 [PH] (ref 5–7.5)
PLATELET # BLD AUTO: 313 X10E3/UL (ref 150–450)
POTASSIUM SERPL-SCNC: 4.6 MMOL/L (ref 3.5–5.2)
PROT SERPL-MCNC: 6.7 G/DL (ref 6–8.5)
PROT UR QL STRIP: NEGATIVE
RBC # BLD AUTO: 5.11 X10E6/UL (ref 3.77–5.28)
RBC #/AREA URNS HPF: ABNORMAL /HPF (ref 0–2)
SODIUM SERPL-SCNC: 141 MMOL/L (ref 134–144)
SP GR UR STRIP: 1.01 (ref 1–1.03)
T4 FREE SERPL-MCNC: 1.01 NG/DL (ref 0.82–1.77)
TRIGL SERPL-MCNC: 110 MG/DL (ref 0–149)
TSH SERPL DL<=0.005 MIU/L-ACNC: 2.54 UIU/ML (ref 0.45–4.5)
URINALYSIS REFLEX: ABNORMAL
UROBILINOGEN UR STRIP-MCNC: 0.2 MG/DL (ref 0.2–1)
VLDLC SERPL CALC-MCNC: 20 MG/DL (ref 5–40)
WBC # BLD AUTO: 6.5 X10E3/UL (ref 3.4–10.8)
WBC #/AREA URNS HPF: ABNORMAL /HPF (ref 0–5)

## 2022-06-19 RX ORDER — ATORVASTATIN CALCIUM 20 MG/1
20 TABLET, FILM COATED ORAL DAILY
Qty: 90 TABLET | Refills: 3 | Status: SHIPPED | OUTPATIENT
Start: 2022-06-19 | End: 2022-12-30

## 2022-06-20 ENCOUNTER — TELEPHONE (OUTPATIENT)
Dept: INTERNAL MEDICINE | Age: 72
End: 2022-06-20

## 2022-06-20 NOTE — TELEPHONE ENCOUNTER
Caller: Sandi Acevedo     Relationship: SELF    Best call back number: [phone]     What is your medical concern?     PATIENT STATED THAT SHE HAS NEVER TAKEN STATINS, WILL NOT TAKE STATINS, AND DO NOT CALL [facility] WITH ANY PRESCRIPTIONS FOR STATINS ON HER BEHALF.      ANY QUESTIONS OR CONCERNS PLEASE CALL PATIENT

## 2022-06-20 NOTE — TELEPHONE ENCOUNTER
Pt had never taken a statin to this day. She is currently taking Red Yeast Rice. She declines to take this med r/t friends having side effects. NENA

## 2022-06-20 NOTE — TELEPHONE ENCOUNTER
----- Message from DARREN Higuera sent at 6/20/2022 10:17 AM EDT -----  Behaviors with the atorvastatin 10 mg was first initiated by Leeanna last year.  To confirm this was never initiated?  What is her hesitation to medication?  Or did she initiate and have symptoms?   ----- Message -----  From: Citlaly Hoyos LPN  Sent: 6/20/2022   8:52 AM EDT  To: DARREN Higuera    Pt has seen results and has sent phone call to provider r/t statin medication. MM

## 2022-06-20 NOTE — TELEPHONE ENCOUNTER
RAMINTVM INSTRUCTING PT TO RETURN CALL TO BE READ LAB RESULTS. LETTER SENT VIA [email or web address]

## 2022-06-27 LAB — DRUGS UR: NORMAL

## 2022-07-01 DIAGNOSIS — J30.9 ALLERGIC RHINITIS: ICD-10-CM

## 2022-07-01 RX ORDER — FLUTICASONE PROPIONATE 50 MCG
2 SPRAY, SUSPENSION (ML) NASAL DAILY
Qty: 48 ML | Refills: 2 | Status: SHIPPED | OUTPATIENT
Start: 2022-07-01

## 2022-08-30 ENCOUNTER — TELEPHONE (OUTPATIENT)
Dept: INTERNAL MEDICINE | Age: 72
End: 2022-08-30

## 2022-08-30 NOTE — TELEPHONE ENCOUNTER
PATIENT WAS REFERRED TO DR. CAMARGO BY TIMBO CORREA AND ADVISED THAT SHE SHOULD SEE HIM.     I INFORMED PATIENT THAT DR. CAMARGO IS NOT TAKING NEW PATIENTS BUT SHE WANTED HIM TO KNOW THAT TIMBO CORREA SENT HER.

## 2022-08-30 NOTE — TELEPHONE ENCOUNTER
I called the patient to schedule an appointment, no answer and no voicemail available. Will try again.

## 2022-10-03 DIAGNOSIS — F41.9 ANXIETY: ICD-10-CM

## 2022-10-03 RX ORDER — LORAZEPAM 0.5 MG/1
0.5 TABLET ORAL EVERY 8 HOURS PRN
Qty: 30 TABLET | Refills: 0 | OUTPATIENT
Start: 2022-10-03

## 2022-10-03 NOTE — TELEPHONE ENCOUNTER
----- Message from Sandi Acevedo sent at 10/1/2022  6:36 AM EDT -----  Regarding: Refill   I saw you in  and you asked me if I needed a refill and it said NOT at this time   Please refill now  Thanks   Sandi Acevedo    1950

## 2022-10-04 DIAGNOSIS — F41.9 ANXIETY: ICD-10-CM

## 2022-10-04 NOTE — TELEPHONE ENCOUNTER
----- Message from DARREN Higuera sent at 10/4/2022  9:59 AM EDT -----  Regarding: FW: Refill   I'm not seeing where [facility] filled this, am I overlooking? Please send me script if not.   ----- Message -----  From: Citlaly Hoyos LPN  Sent: 10/3/2022   8:37 AM EDT  To: DARREN Higuera  Subject: FW: Refill                                       Sent to DARREN Luis for refill. UDS and contract 2022. MM  ----- Message -----  From: Sandi Acevedo  Sent: 10/1/2022   6:36 AM EDT  To: Aleksandar Fischer [id number]  Subject: Refill                                           I saw you in  and you asked me if I needed a refill and it said NOT at this time   Please refill now  Thanks   Sandi Acevedo    1950

## 2022-10-05 RX ORDER — LORAZEPAM 0.5 MG/1
0.5 TABLET ORAL EVERY 8 HOURS PRN
Qty: 30 TABLET | Refills: 0 | Status: SHIPPED | OUTPATIENT
Start: 2022-10-05

## 2022-12-30 ENCOUNTER — PATIENT ROUNDING (BHMG ONLY) (OUTPATIENT)
Dept: INTERNAL MEDICINE | Age: 72
End: 2022-12-30

## 2022-12-30 ENCOUNTER — HOSPITAL ENCOUNTER (OUTPATIENT)
Dept: BONE DENSITY | Facility: HOSPITAL | Age: 72
Discharge: HOME | End: 2022-12-30
Admitting: NURSE PRACTITIONER

## 2022-12-30 ENCOUNTER — OFFICE VISIT (OUTPATIENT)
Dept: INTERNAL MEDICINE | Age: 72
End: 2022-12-30

## 2022-12-30 VITALS
HEART RATE: 78 BPM | SYSTOLIC BLOOD PRESSURE: 140 MMHG | OXYGEN SATURATION: 99 % | HEIGHT: 62 IN | TEMPERATURE: 96.8 F | WEIGHT: 147 LBS | BODY MASS INDEX: 27.05 KG/M2 | DIASTOLIC BLOOD PRESSURE: 84 MMHG

## 2022-12-30 DIAGNOSIS — I10 HYPERTENSION, ESSENTIAL: Primary | ICD-10-CM

## 2022-12-30 DIAGNOSIS — E55.9 VITAMIN D DEFICIENCY DISEASE: ICD-10-CM

## 2022-12-30 DIAGNOSIS — M85.80 OSTEOPENIA, UNSPECIFIED LOCATION: ICD-10-CM

## 2022-12-30 DIAGNOSIS — Z78.0 POSTMENOPAUSAL: ICD-10-CM

## 2022-12-30 DIAGNOSIS — I65.23 CAROTID ARTERY PLAQUE, BILATERAL: ICD-10-CM

## 2022-12-30 DIAGNOSIS — F41.0 PANIC ATTACK: ICD-10-CM

## 2022-12-30 DIAGNOSIS — J43.9 PULMONARY EMPHYSEMA, UNSPECIFIED EMPHYSEMA TYPE: ICD-10-CM

## 2022-12-30 DIAGNOSIS — Z51.81 THERAPEUTIC DRUG MONITORING: ICD-10-CM

## 2022-12-30 DIAGNOSIS — R73.03 PREDIABETES: ICD-10-CM

## 2022-12-30 DIAGNOSIS — E78.5 HYPERLIPIDEMIA, UNSPECIFIED HYPERLIPIDEMIA TYPE: ICD-10-CM

## 2022-12-30 PROCEDURE — 77080 DXA BONE DENSITY AXIAL: CPT

## 2022-12-30 PROCEDURE — 99214 OFFICE O/P EST MOD 30 MIN: CPT | Performed by: NURSE PRACTITIONER

## 2022-12-30 NOTE — PROGRESS NOTES
A My-Chart message has been sent to the patient for PATIENT ROUNDING with [facility]

## 2022-12-30 NOTE — PROGRESS NOTES
I N T E R N A L  M E D I C I N E  DARREN ZIMMERMAN      ENCOUNTER DATE:  12/30/2022    Sandi SANDRINE Kristina / 72 y.o. / female      CHIEF COMPLAINT / REASON FOR OFFICE VISIT     Establish Care, Hypertension, Hyperlipidemia, Vitamin D Deficiency, osteopenia, COPD, Panic Attack, carotid artery plaque, and Prediabetes      ASSESSMENT & PLAN     Problem List Items Addressed This Visit        Cardiac and Vasculature    Hypertension, essential    Relevant Medications    amLODIPine (NORVASC) 5 MG tablet    amLODIPine (NORVASC) 2.5 MG tablet    Other Relevant Orders    Comprehensive Metabolic Panel    CBC w AUTO Differential    TSH+Free T4    Urinalysis With Culture If Indicated - Urine, Clean Catch    Hyperlipidemia    Relevant Orders    Lipid Panel With / Chol / HDL Ratio       Endocrine and Metabolic    Vitamin D deficiency disease - Primary    Relevant Orders    Vitamin D,25-Hydroxy       Musculoskeletal and Injuries    Osteopenia       Pulmonary and Pneumonias    COPD (chronic obstructive pulmonary disease) (HCC)    Relevant Medications    fluticasone (FLONASE) 50 MCG/ACT nasal spray    Other Relevant Orders    Full Pulmonary Function Test With Bronchodilator   Other Visit Diagnoses     Panic attack        Relevant Orders    Compliance Drug Analysis, Ur - Urine, Clean Catch    Therapeutic drug monitoring        Relevant Orders    Compliance Drug Analysis, Ur - Urine, Clean Catch    Carotid artery plaque, bilateral        Relevant Orders    Duplex Carotid Ultrasound CAR    Prediabetes        Relevant Orders    Hemoglobin A1c        Orders Placed This Encounter   Procedures   • Comprehensive Metabolic Panel   • Lipid Panel With / Chol / HDL Ratio   • Vitamin D,25-Hydroxy   • Compliance Drug Analysis, Ur - Urine, Clean Catch   • TSH+Free T4   • Hemoglobin A1c   • Urinalysis With Culture If Indicated - Urine, Clean Catch   • Full Pulmonary Function Test With Bronchodilator   • CBC w AUTO Differential     No orders of the  "defined types were placed in this encounter.      SUMMARY/DISCUSSION  • Carlos reviewed, controlled substance contract up-to-date as of June 2022.   • Follow-up as scheduled with new provider in 6 months    Next Appointment with me: Visit date not found    Return for will establish with Dr. Dumont in 6 months .      VITAL SIGNS     Visit Vitals  /84   Pulse 78   Temp 96.8 °F (36 °C)   Ht 157.5 cm (62.01\")   Wt 66.7 kg (147 lb)   LMP  (LMP Unknown)   SpO2 99%   BMI 26.88 kg/m²     Wt Readings from Last 3 Encounters:   12/30/22 66.7 kg (147 lb)   06/17/22 65.7 kg (144 lb 12.8 oz)   02/24/22 67.2 kg (148 lb 3.2 oz)     Body mass index is 26.88 kg/m².      MEDICATIONS AT THE TIME OF OFFICE VISIT     Current Outpatient Medications on File Prior to Visit   Medication Sig   • amLODIPine (NORVASC) 2.5 MG tablet TAKE 1 TABLET BY MOUTH EVERY DAY   • amLODIPine (NORVASC) 5 MG tablet TAKE 1 TABLET BY MOUTH DAILY. TAKES IN COMBINATION WITH 2.5MG FOR TOTAL 7.5MG DAILY.   • ASPIRIN 81 PO Take 1 tablet by mouth Daily.   • calcium carbonate (OS-ROCIO) 600 MG tablet Take 600 mg by mouth daily.   • cholecalciferol (VITAMIN D3) 1000 UNITS tablet Take 1,000 Units by mouth daily.   • Flaxseed, Linseed, (FLAXSEED OIL PO) Take  by mouth.   • fluticasone (FLONASE) 50 MCG/ACT nasal spray 2 sprays by Each Nare route Daily.   • latanoprost (XALATAN) 0.005 % ophthalmic solution INSTIL 1 DROP IN EYE(S) AT BEDTIME   • LORazepam (Ativan) 0.5 MG tablet Take 1 tablet by mouth Every 8 (Eight) Hours As Needed for Anxiety.   • Probiotic Product (PROBIOTIC DAILY PO) Take  by mouth.   • vitamin E 1000 UNIT capsule Take 1,000 Units by mouth Daily.   • Zinc 50 MG capsule Take  by mouth.   • [DISCONTINUED] atorvastatin (LIPITOR) 20 MG tablet Take 1 tablet by mouth Daily.   • [DISCONTINUED] cyclobenzaprine (FLEXERIL) 5 MG tablet TAKE 1 TABLET BY MOUTH 3 TIMES A DAY AS NEEDED FOR MUSCLE SPASMS.   • [DISCONTINUED] mometasone (ELOCON) 0.1 % ointment      No " current facility-administered medications on file prior to visit.          HISTORY OF PRESENT ILLNESS     Patient originally presents to establish care, however patient is reestablishing with Dr. Dumont in June, as she does not want to follow our practice off the hospital campus.    Indeterminate right breast asymmetries were seen on last mammogram, patient declines further screening, discussed risk of potential malignancy patient still declines.  States that she will consider in 6 months. Acceptable for order for DEXA scan which was scheduled today. 2019 showed osteopenia.  On calcium and vitamin D supplementation.  Has stopped Pap smear.  Cologuard completed Dec, 2019, negative.     Hypertension: Elevated at 140/84 today in office, well controlled in the 130s over 70s low end with amlodipine at home. Denies any chest pain, shortness of air, headache, visual disturbances, lower extremity leg swelling, or heart palpitations.      Hyperlipidemia: Has never taken a statin.  Declines to do so.  Declines Repatha or Zetia.  Taking red yeast rice.     Anxiety: Rare use of Ativan less than 1 times monthly, for increased anxiety/panic attack.      Impaired fasting glucose last hemoglobin A1c 6.0.  No medication treatment at this time.    Followed by ophthalmology last seen by Dr. Sarah May 2022 for glaucoma.    She was seen by cardiology in 2018 for stenosis of the carotid artery, hyperlipidemia, hypertension palpitations recommended starting aspirin 81 mg.     Followed by Dr. Topher Millard, last April 2021 scan bilateral plaque no significant stenosis, no longer following.    Seen in the past by Dr. Bauer urology for hematuria and history of cystocele.  Last seen by an OB/GYN in 2016 for lichen sclerosis.  No longer follows either provider.    History of untreated COPD, no shortness of breath, due for updated pulmonary function testing.     REVIEW OF SYSTEMS     Constitutional neg except per HPI   Resp neg  CV neg    PHYSICAL  EXAMINATION     Physical Exam  Constitutional  No distress  Cardiovascular Rate  normal . Rhythm: regular . Heart sounds:  normal  Pulmonary/Chest  Effort normal. Breath sounds:  normal  Psychiatric  Alert. Judgment and thought content normal. Mood normal     REVIEWED DATA     Labs:   Lab Results   Component Value Date    GLUCOSE 104 (H) 06/17/2022    BUN 16 06/17/2022    CREATININE 0.95 06/17/2022    EGFRIFNONA 68 12/17/2021    EGFRIFAFRI 79 12/17/2021    BCR 17 06/17/2022    K 4.6 06/17/2022    CO2 20 06/17/2022    CALCIUM 9.5 06/17/2022    PROTENTOTREF 6.7 06/17/2022    ALBUMIN 4.6 06/17/2022    LABIL2 2.2 06/17/2022    AST 32 06/17/2022    ALT 19 06/17/2022     Lab Results   Component Value Date    WBC 6.5 06/17/2022    HGB 15.2 06/17/2022    HCT 45.6 06/17/2022    MCV 89 06/17/2022     06/17/2022     Lab Results   Component Value Date    TSH 2.540 06/17/2022    S4BITCU 7.7 05/23/2016     Lab Results   Component Value Date    HGBA1C 6.0 (H) 06/17/2022     Lab Results   Component Value Date    CHLPL 214 (H) 06/17/2022    TRIG 110 06/17/2022    HDL 55 06/17/2022     (H) 06/17/2022         Imaging:           Medical Tests:           Summary of old records / correspondence / consultant report:           Request outside records:             *Examiner was wearing medical surgical mask    **Dragon dictation for documentation

## 2023-01-04 LAB
25(OH)D3+25(OH)D2 SERPL-MCNC: 54.5 NG/ML (ref 30–100)
ALBUMIN SERPL-MCNC: 4.4 G/DL (ref 3.7–4.7)
ALBUMIN/GLOB SERPL: 2.2 {RATIO} (ref 1.2–2.2)
ALP SERPL-CCNC: 112 IU/L (ref 44–121)
ALT SERPL-CCNC: 23 IU/L (ref 0–32)
APPEARANCE UR: CLEAR
AST SERPL-CCNC: 27 IU/L (ref 0–40)
BACTERIA #/AREA URNS HPF: NORMAL /[HPF]
BACTERIA UR CULT: NORMAL
BACTERIA UR CULT: NORMAL
BASOPHILS # BLD AUTO: 0.1 X10E3/UL (ref 0–0.2)
BASOPHILS NFR BLD AUTO: 1 %
BILIRUB SERPL-MCNC: 0.7 MG/DL (ref 0–1.2)
BILIRUB UR QL STRIP: NEGATIVE
BUN SERPL-MCNC: 15 MG/DL (ref 8–27)
BUN/CREAT SERPL: 17 (ref 12–28)
CALCIUM SERPL-MCNC: 9.5 MG/DL (ref 8.7–10.3)
CASTS URNS QL MICRO: NORMAL /LPF
CHLORIDE SERPL-SCNC: 103 MMOL/L (ref 96–106)
CHOLEST SERPL-MCNC: 203 MG/DL (ref 100–199)
CHOLEST/HDLC SERPL: 3.9 RATIO (ref 0–4.4)
CO2 SERPL-SCNC: 23 MMOL/L (ref 20–29)
COLOR UR: YELLOW
CREAT SERPL-MCNC: 0.89 MG/DL (ref 0.57–1)
EGFRCR SERPLBLD CKD-EPI 2021: 68 ML/MIN/1.73
EOSINOPHIL # BLD AUTO: 0.1 X10E3/UL (ref 0–0.4)
EOSINOPHIL NFR BLD AUTO: 1 %
EPI CELLS #/AREA URNS HPF: NORMAL /HPF (ref 0–10)
ERYTHROCYTE [DISTWIDTH] IN BLOOD BY AUTOMATED COUNT: 12.5 % (ref 11.7–15.4)
GLOBULIN SER CALC-MCNC: 2 G/DL (ref 1.5–4.5)
GLUCOSE SERPL-MCNC: 97 MG/DL (ref 70–99)
GLUCOSE UR QL STRIP: NEGATIVE
HBA1C MFR BLD: 6 % (ref 4.8–5.6)
HCT VFR BLD AUTO: 43.7 % (ref 34–46.6)
HDLC SERPL-MCNC: 52 MG/DL
HGB BLD-MCNC: 14.8 G/DL (ref 11.1–15.9)
HGB UR QL STRIP: NEGATIVE
IMM GRANULOCYTES # BLD AUTO: 0 X10E3/UL (ref 0–0.1)
IMM GRANULOCYTES NFR BLD AUTO: 0 %
KETONES UR QL STRIP: NEGATIVE
LDLC SERPL CALC-MCNC: 135 MG/DL (ref 0–99)
LEUKOCYTE ESTERASE UR QL STRIP: ABNORMAL
LYMPHOCYTES # BLD AUTO: 1.5 X10E3/UL (ref 0.7–3.1)
LYMPHOCYTES NFR BLD AUTO: 24 %
MCH RBC QN AUTO: 29.8 PG (ref 26.6–33)
MCHC RBC AUTO-ENTMCNC: 33.9 G/DL (ref 31.5–35.7)
MCV RBC AUTO: 88 FL (ref 79–97)
MICRO URNS: ABNORMAL
MONOCYTES # BLD AUTO: 0.5 X10E3/UL (ref 0.1–0.9)
MONOCYTES NFR BLD AUTO: 9 %
NEUTROPHILS # BLD AUTO: 4.1 X10E3/UL (ref 1.4–7)
NEUTROPHILS NFR BLD AUTO: 65 %
NITRITE UR QL STRIP: NEGATIVE
PH UR STRIP: 6.5 [PH] (ref 5–7.5)
PLATELET # BLD AUTO: 294 X10E3/UL (ref 150–450)
POTASSIUM SERPL-SCNC: 4.5 MMOL/L (ref 3.5–5.2)
PROT SERPL-MCNC: 6.4 G/DL (ref 6–8.5)
PROT UR QL STRIP: NEGATIVE
RBC # BLD AUTO: 4.97 X10E6/UL (ref 3.77–5.28)
RBC #/AREA URNS HPF: NORMAL /HPF (ref 0–2)
SODIUM SERPL-SCNC: 140 MMOL/L (ref 134–144)
SP GR UR STRIP: 1.02 (ref 1–1.03)
T4 FREE SERPL-MCNC: 1.04 NG/DL (ref 0.82–1.77)
TRIGL SERPL-MCNC: 88 MG/DL (ref 0–149)
TSH SERPL DL<=0.005 MIU/L-ACNC: 2.22 UIU/ML (ref 0.45–4.5)
URINALYSIS REFLEX: ABNORMAL
UROBILINOGEN UR STRIP-MCNC: 0.2 MG/DL (ref 0.2–1)
VLDLC SERPL CALC-MCNC: 16 MG/DL (ref 5–40)
WBC # BLD AUTO: 6.3 X10E3/UL (ref 3.4–10.8)
WBC #/AREA URNS HPF: NORMAL /HPF (ref 0–5)

## 2023-01-07 LAB — DRUGS UR: NORMAL

## 2023-01-13 ENCOUNTER — HOSPITAL ENCOUNTER (OUTPATIENT)
Dept: RESPIRATORY THERAPY | Facility: HOSPITAL | Age: 73
Discharge: HOME | End: 2023-01-13
Payer: MEDICARE

## 2023-01-13 ENCOUNTER — HOSPITAL ENCOUNTER (OUTPATIENT)
Dept: CARDIOLOGY | Facility: HOSPITAL | Age: 73
Setting detail: HOSPITAL OUTPATIENT SURGERY
Discharge: HOME | End: 2023-01-13
Payer: MEDICARE

## 2023-01-13 DIAGNOSIS — J43.9 PULMONARY EMPHYSEMA, UNSPECIFIED EMPHYSEMA TYPE: ICD-10-CM

## 2023-01-13 DIAGNOSIS — I65.23 CAROTID ARTERY PLAQUE, BILATERAL: ICD-10-CM

## 2023-01-13 LAB
BH CV XLRA MEAS LEFT DIST CCA EDV: -11 CM/SEC
BH CV XLRA MEAS LEFT DIST CCA PSV: -53 CM/SEC
BH CV XLRA MEAS LEFT DIST ICA EDV: -20.5 CM/SEC
BH CV XLRA MEAS LEFT DIST ICA PSV: -88.5 CM/SEC
BH CV XLRA MEAS LEFT ICA/CCA RATIO: 1.67
BH CV XLRA MEAS LEFT MID ICA EDV: -22.4 CM/SEC
BH CV XLRA MEAS LEFT MID ICA PSV: -84.8 CM/SEC
BH CV XLRA MEAS LEFT PROX CCA EDV: 15.2 CM/SEC
BH CV XLRA MEAS LEFT PROX CCA PSV: 97.3 CM/SEC
BH CV XLRA MEAS LEFT PROX ECA EDV: -9 CM/SEC
BH CV XLRA MEAS LEFT PROX ECA PSV: -59.3 CM/SEC
BH CV XLRA MEAS LEFT PROX ICA EDV: 14.5 CM/SEC
BH CV XLRA MEAS LEFT PROX ICA PSV: 52.6 CM/SEC
BH CV XLRA MEAS LEFT PROX SCLA PSV: 95.6 CM/SEC
BH CV XLRA MEAS LEFT VERTEBRAL A EDV: -7.9 CM/SEC
BH CV XLRA MEAS LEFT VERTEBRAL A PSV: -48.3 CM/SEC
BH CV XLRA MEAS RIGHT DIST CCA EDV: -11.8 CM/SEC
BH CV XLRA MEAS RIGHT DIST CCA PSV: -64.4 CM/SEC
BH CV XLRA MEAS RIGHT DIST ICA EDV: -17.7 CM/SEC
BH CV XLRA MEAS RIGHT DIST ICA PSV: -69.5 CM/SEC
BH CV XLRA MEAS RIGHT ICA/CCA RATIO: 1.16
BH CV XLRA MEAS RIGHT MID ICA EDV: -16.5 CM/SEC
BH CV XLRA MEAS RIGHT MID ICA PSV: -75 CM/SEC
BH CV XLRA MEAS RIGHT PROX CCA EDV: -12.3 CM/SEC
BH CV XLRA MEAS RIGHT PROX CCA PSV: -69.2 CM/SEC
BH CV XLRA MEAS RIGHT PROX ECA EDV: -8.2 CM/SEC
BH CV XLRA MEAS RIGHT PROX ECA PSV: -77.4 CM/SEC
BH CV XLRA MEAS RIGHT PROX ICA EDV: -10.2 CM/SEC
BH CV XLRA MEAS RIGHT PROX ICA PSV: -34.5 CM/SEC
BH CV XLRA MEAS RIGHT PROX SCLA PSV: 105 CM/SEC
BH CV XLRA MEAS RIGHT VERTEBRAL A EDV: -6.9 CM/SEC
BH CV XLRA MEAS RIGHT VERTEBRAL A PSV: -43.3 CM/SEC
LEFT ARM BP: NORMAL MMHG
MAXIMAL PREDICTED HEART RATE: 146 BPM
RIGHT ARM BP: NORMAL MMHG
STRESS TARGET HR: 124 BPM

## 2023-01-13 PROCEDURE — 93880 EXTRACRANIAL BILAT STUDY: CPT

## 2023-01-13 PROCEDURE — 94060 EVALUATION OF WHEEZING: CPT

## 2023-01-13 PROCEDURE — 94727 GAS DIL/WSHOT DETER LNG VOL: CPT

## 2023-01-13 PROCEDURE — 94640 AIRWAY INHALATION TREATMENT: CPT

## 2023-01-13 RX ORDER — ALBUTEROL SULFATE 2.5 MG/3ML
2.5 SOLUTION RESPIRATORY (INHALATION) ONCE
Status: COMPLETED | OUTPATIENT
Start: 2023-01-13 | End: 2023-01-13

## 2023-01-13 RX ADMIN — ALBUTEROL SULFATE 2.5 MG: 2.5 SOLUTION RESPIRATORY (INHALATION) at 08:54

## 2023-01-16 ENCOUNTER — TELEPHONE (OUTPATIENT)
Dept: INTERNAL MEDICINE | Age: 73
End: 2023-01-16
Payer: MEDICARE

## 2023-01-16 NOTE — TELEPHONE ENCOUNTER
----- Message from DARREN Higuera sent at 1/13/2023  5:49 PM EST -----  Overall your pulmonary function test was not able to be interpreted welled due to suboptimal breathing efforts.  I did do additional studies with nitrogen which was within normal limits, however if you are having shortness of breath or symptoms we may want to refer you to pulmonary.  Please let me know

## 2023-01-16 NOTE — TELEPHONE ENCOUNTER
Caller: Sandi Acevedo    Relationship: Self    Best call back number: [phone]    What was the call regarding: PATIENT WOULD LIKE TO KNOW IF SHE CAN TAKE CO 10 WITH HER RED YEAST RICE. IF SO HOW MUCH?    Do you require a callback: PLEASE CALL AND ADVISE

## 2023-02-01 DIAGNOSIS — I10 HYPERTENSION, ESSENTIAL: ICD-10-CM

## 2023-02-01 RX ORDER — AMLODIPINE BESYLATE 5 MG/1
5 TABLET ORAL DAILY
Qty: 90 TABLET | Refills: 1 | Status: SHIPPED | OUTPATIENT
Start: 2023-02-01

## 2023-02-01 RX ORDER — AMLODIPINE BESYLATE 2.5 MG/1
2.5 TABLET ORAL DAILY
Qty: 90 TABLET | Refills: 1 | Status: SHIPPED | OUTPATIENT
Start: 2023-02-01

## 2023-06-09 ENCOUNTER — LAB (OUTPATIENT)
Dept: LAB | Facility: HOSPITAL | Age: 73
End: 2023-06-09
Payer: MEDICARE

## 2023-06-09 ENCOUNTER — OFFICE VISIT (OUTPATIENT)
Dept: INTERNAL MEDICINE | Facility: CLINIC | Age: 73
End: 2023-06-09
Payer: MEDICARE

## 2023-06-09 VITALS
DIASTOLIC BLOOD PRESSURE: 70 MMHG | OXYGEN SATURATION: 95 % | HEART RATE: 67 BPM | WEIGHT: 148.6 LBS | BODY MASS INDEX: 27.34 KG/M2 | SYSTOLIC BLOOD PRESSURE: 152 MMHG | HEIGHT: 62 IN | TEMPERATURE: 97.1 F | RESPIRATION RATE: 16 BRPM

## 2023-06-09 DIAGNOSIS — Z00.00 ENCOUNTER FOR PREVENTIVE HEALTH EXAMINATION: Primary | ICD-10-CM

## 2023-06-09 DIAGNOSIS — I10 HYPERTENSION, ESSENTIAL: Chronic | ICD-10-CM

## 2023-06-09 DIAGNOSIS — M85.80 OSTEOPENIA, UNSPECIFIED LOCATION: Chronic | ICD-10-CM

## 2023-06-09 DIAGNOSIS — E78.5 HYPERLIPIDEMIA, UNSPECIFIED HYPERLIPIDEMIA TYPE: Chronic | ICD-10-CM

## 2023-06-09 DIAGNOSIS — R73.02 IMPAIRED GLUCOSE TOLERANCE: ICD-10-CM

## 2023-06-09 DIAGNOSIS — Z79.899 MEDICATION MANAGEMENT: ICD-10-CM

## 2023-06-09 PROBLEM — R00.2 PALPITATIONS: Status: RESOLVED | Noted: 2017-05-24 | Resolved: 2023-06-09

## 2023-06-09 PROBLEM — M72.2 PLANTAR FASCIITIS OF LEFT FOOT: Status: RESOLVED | Noted: 2018-07-01 | Resolved: 2023-06-09

## 2023-06-09 PROBLEM — T46.4X5A COUGH DUE TO ACE INHIBITOR: Status: RESOLVED | Noted: 2017-04-28 | Resolved: 2023-06-09

## 2023-06-09 PROBLEM — R05.8 COUGH DUE TO ACE INHIBITOR: Status: RESOLVED | Noted: 2017-04-28 | Resolved: 2023-06-09

## 2023-06-09 LAB
ALBUMIN SERPL-MCNC: 4.5 G/DL (ref 3.5–5.2)
ALBUMIN/GLOB SERPL: 1.7 G/DL
ALP SERPL-CCNC: 115 U/L (ref 39–117)
ALT SERPL W P-5'-P-CCNC: 21 U/L (ref 1–33)
ANION GAP SERPL CALCULATED.3IONS-SCNC: 11 MMOL/L (ref 5–15)
AST SERPL-CCNC: 28 U/L (ref 1–32)
BASOPHILS # BLD AUTO: 0.07 10*3/MM3 (ref 0–0.2)
BASOPHILS NFR BLD AUTO: 0.9 % (ref 0–1.5)
BILIRUB BLD-MCNC: NEGATIVE MG/DL
BILIRUB SERPL-MCNC: 0.6 MG/DL (ref 0–1.2)
BUN SERPL-MCNC: 13 MG/DL (ref 8–23)
BUN/CREAT SERPL: 15.3 (ref 7–25)
CALCIUM SPEC-SCNC: 9.2 MG/DL (ref 8.6–10.5)
CHLORIDE SERPL-SCNC: 106 MMOL/L (ref 98–107)
CHOLEST SERPL-MCNC: 198 MG/DL (ref 0–200)
CLARITY, POC: CLEAR
CO2 SERPL-SCNC: 25 MMOL/L (ref 22–29)
COLOR UR: YELLOW
CREAT SERPL-MCNC: 0.85 MG/DL (ref 0.57–1)
DEPRECATED RDW RBC AUTO: 39.3 FL (ref 37–54)
EGFRCR SERPLBLD CKD-EPI 2021: 72 ML/MIN/1.73
EOSINOPHIL # BLD AUTO: 0.08 10*3/MM3 (ref 0–0.4)
EOSINOPHIL NFR BLD AUTO: 1.1 % (ref 0.3–6.2)
ERYTHROCYTE [DISTWIDTH] IN BLOOD BY AUTOMATED COUNT: 12.6 % (ref 12.3–15.4)
EXPIRATION DATE: ABNORMAL
GLOBULIN UR ELPH-MCNC: 2.7 GM/DL
GLUCOSE SERPL-MCNC: 130 MG/DL (ref 65–99)
GLUCOSE UR STRIP-MCNC: NEGATIVE MG/DL
HBA1C MFR BLD: 5.9 % (ref 4.8–5.6)
HCT VFR BLD AUTO: 43.2 % (ref 34–46.6)
HDLC SERPL QL: 3.6
HDLC SERPL-MCNC: 55 MG/DL (ref 40–60)
HGB BLD-MCNC: 15 G/DL (ref 12–15.9)
IMM GRANULOCYTES # BLD AUTO: 0.02 10*3/MM3 (ref 0–0.05)
IMM GRANULOCYTES NFR BLD AUTO: 0.3 % (ref 0–0.5)
KETONES UR QL: NEGATIVE
LDLC SERPL CALC-MCNC: 125 MG/DL (ref 0–100)
LEUKOCYTE EST, POC: ABNORMAL
LYMPHOCYTES # BLD AUTO: 1.65 10*3/MM3 (ref 0.7–3.1)
LYMPHOCYTES NFR BLD AUTO: 21.8 % (ref 19.6–45.3)
Lab: ABNORMAL
MCH RBC QN AUTO: 29.5 PG (ref 26.6–33)
MCHC RBC AUTO-ENTMCNC: 34.7 G/DL (ref 31.5–35.7)
MCV RBC AUTO: 85 FL (ref 79–97)
MONOCYTES # BLD AUTO: 0.68 10*3/MM3 (ref 0.1–0.9)
MONOCYTES NFR BLD AUTO: 9 % (ref 5–12)
NEUTROPHILS NFR BLD AUTO: 5.08 10*3/MM3 (ref 1.7–7)
NEUTROPHILS NFR BLD AUTO: 66.9 % (ref 42.7–76)
NITRITE UR-MCNC: NEGATIVE MG/ML
NRBC BLD AUTO-RTO: 0 /100 WBC (ref 0–0.2)
PH UR: 7 [PH] (ref 5–8)
PLATELET # BLD AUTO: 319 10*3/MM3 (ref 140–450)
PMV BLD AUTO: 10.4 FL (ref 6–12)
POTASSIUM SERPL-SCNC: 4.2 MMOL/L (ref 3.5–5.2)
PROT SERPL-MCNC: 7.2 G/DL (ref 6–8.5)
PROT UR STRIP-MCNC: ABNORMAL MG/DL
RBC # BLD AUTO: 5.08 10*6/MM3 (ref 3.77–5.28)
RBC # UR STRIP: NEGATIVE /UL
SODIUM SERPL-SCNC: 142 MMOL/L (ref 136–145)
SP GR UR: 1.01 (ref 1–1.03)
TRIGL SERPL-MCNC: 101 MG/DL (ref 0–150)
UROBILINOGEN UR QL: NORMAL
VLDLC SERPL-MCNC: 18 MG/DL (ref 5–40)
WBC NRBC COR # BLD: 7.58 10*3/MM3 (ref 3.4–10.8)

## 2023-06-09 PROCEDURE — 80053 COMPREHEN METABOLIC PANEL: CPT | Performed by: INTERNAL MEDICINE

## 2023-06-09 PROCEDURE — 85025 COMPLETE CBC W/AUTO DIFF WBC: CPT | Performed by: INTERNAL MEDICINE

## 2023-06-09 PROCEDURE — 83036 HEMOGLOBIN GLYCOSYLATED A1C: CPT | Performed by: INTERNAL MEDICINE

## 2023-06-09 PROCEDURE — 80061 LIPID PANEL: CPT | Performed by: INTERNAL MEDICINE

## 2023-06-09 PROCEDURE — 36415 COLL VENOUS BLD VENIPUNCTURE: CPT | Performed by: INTERNAL MEDICINE

## 2023-06-09 RX ORDER — LANOLIN ALCOHOL/MO/W.PET/CERES
400 CREAM (GRAM) TOPICAL DAILY
COMMUNITY

## 2023-06-09 RX ORDER — UBIDECARENONE 100 MG
100 CAPSULE ORAL DAILY
COMMUNITY

## 2023-06-09 RX ORDER — AMPICILLIN TRIHYDRATE 250 MG
1200 CAPSULE ORAL DAILY
COMMUNITY

## 2023-06-09 RX ORDER — CETIRIZINE HYDROCHLORIDE 10 MG/1
10 TABLET ORAL DAILY PRN
COMMUNITY

## 2023-06-09 NOTE — PROGRESS NOTES
Advance Care Planning   ACP discussion was held with the patient during this visit. Patient does not have an advance directive, information provided.

## 2023-06-09 NOTE — PROGRESS NOTES
Subjective   Sandi Acevedo is a 72 y.o. female.     Chief Complaint   Patient presents with    Annual Exam         History of Present Illness  In for initial visit, transition of care, and annual preventive exam.  Sleeps 7 to 8 hours per night.  Exercises daily.  Energy is good.  Diet is well-balanced.     The following portions of the patient's history were reviewed and updated as appropriate: allergies, current medications, past social history and problem list.    Outpatient Medications Marked as Taking for the 6/9/23 encounter (Office Visit) with Sheldon Dumont MD   Medication Sig Dispense Refill    amLODIPine (NORVASC) 2.5 MG tablet Take 1 tablet by mouth Daily. 90 tablet 1    amLODIPine (NORVASC) 5 MG tablet Take 1 tablet by mouth Daily. Takes in combination with 2.5mg for total 7.5mg daily. 90 tablet 1    ASPIRIN 81 PO Take 1 tablet by mouth Daily.      B COMPLEX-C ER PO Take 1 tablet by mouth 3 (Three) Times a Week.      calcium carbonate (OS-ROCIO) 600 MG tablet Take 1 tablet by mouth Daily.      cetirizine (zyrTEC) 10 MG tablet Take 1 tablet by mouth Daily As Needed for Allergies.      cholecalciferol (VITAMIN D3) 1000 UNITS tablet Take 1 tablet by mouth Daily.      coenzyme Q10 100 MG capsule Take 1 capsule by mouth Daily.      Flaxseed, Linseed, (FLAXSEED OIL PO) Take 1,200 mg by mouth Daily.      fluticasone (FLONASE) 50 MCG/ACT nasal spray 2 sprays by Each Nare route Daily. 48 mL 2    latanoprost (XALATAN) 0.005 % ophthalmic solution INSTIL 1 DROP IN EYE(S) AT BEDTIME      LORazepam (Ativan) 0.5 MG tablet Take 1 tablet by mouth Every 8 (Eight) Hours As Needed for Anxiety. 30 tablet 0    Magnesium Oxide 400 (240 Mg) MG tablet Take 1 tablet by mouth Daily.      Probiotic Product (PROBIOTIC DAILY PO) Take 1 tablet by mouth Daily.      Red Yeast Rice 600 MG capsule Take 1,200 mg by mouth Daily.      Vitamin E 180 MG (400 UNIT) capsule capsule Take 1 capsule by mouth Daily.      Zinc 50 MG capsule Take  25 mg by mouth Daily.         Review of Systems   Constitutional:  Negative for appetite change, chills, diaphoresis, fatigue, fever and unexpected weight change.   Respiratory:  Negative for cough, chest tightness, shortness of breath and wheezing.    Cardiovascular:  Negative for chest pain, palpitations and leg swelling.   Gastrointestinal:  Negative for abdominal pain, anal bleeding, blood in stool, constipation, diarrhea, nausea, rectal pain and vomiting.   Endocrine: Negative for cold intolerance, heat intolerance and polyuria.   Genitourinary:  Negative for difficulty urinating, dysuria, flank pain, frequency, hematuria and urgency.   Musculoskeletal:  Negative for arthralgias, back pain and myalgias.   Allergic/Immunologic: Positive for environmental allergies.   Neurological:  Negative for dizziness, syncope, speech difficulty, weakness, light-headedness, numbness and headaches.   Hematological:  Does not bruise/bleed easily.   Psychiatric/Behavioral:  Negative for agitation, confusion, dysphoric mood and sleep disturbance. The patient is nervous/anxious.      Objective   Vitals:    06/09/23 0912   BP: 152/70   Pulse: 67   Resp: 16   Temp: 97.1 °F (36.2 °C)   SpO2: 95%      Wt Readings from Last 3 Encounters:   06/09/23 67.4 kg (148 lb 9.6 oz)   12/30/22 66.7 kg (147 lb)   06/17/22 65.7 kg (144 lb 12.8 oz)    Body mass index is 27.17 kg/m².      Physical Exam  Vitals and nursing note reviewed.   Constitutional:       Appearance: Normal appearance. She is well-developed.   HENT:      Right Ear: Tympanic membrane and external ear normal.      Left Ear: Tympanic membrane and external ear normal.      Nose: Nose normal.   Eyes:      Extraocular Movements: Extraocular movements intact.      Conjunctiva/sclera: Conjunctivae normal.      Pupils: Pupils are equal, round, and reactive to light.   Neck:      Thyroid: No thyromegaly.      Vascular: No JVD.   Cardiovascular:      Rate and Rhythm: Normal rate and  regular rhythm.      Heart sounds: Normal heart sounds. No murmur heard.    No gallop.   Pulmonary:      Effort: Pulmonary effort is normal. No respiratory distress.      Breath sounds: Normal breath sounds. No wheezing or rales.   Abdominal:      General: Bowel sounds are normal. There is no distension.      Palpations: Abdomen is soft. There is no mass.      Tenderness: There is no abdominal tenderness. There is no guarding.      Hernia: No hernia is present.   Musculoskeletal:         General: Normal range of motion.      Cervical back: Normal range of motion and neck supple.   Lymphadenopathy:      Cervical: No cervical adenopathy.   Skin:     General: Skin is warm and dry.   Neurological:      General: No focal deficit present.      Mental Status: She is alert and oriented to person, place, and time.      Cranial Nerves: No cranial nerve deficit.      Coordination: Coordination normal.      Deep Tendon Reflexes: Reflexes normal.   Psychiatric:         Mood and Affect: Mood normal.         Behavior: Behavior normal.         Thought Content: Thought content normal.         Judgment: Judgment normal.         Problems Addressed this Visit          Cardiac and Vasculature    Hypertension, essential (Chronic)    Hyperlipidemia (Chronic)       Musculoskeletal and Injuries    Osteopenia (Chronic)     Other Visit Diagnoses       Encounter for preventive health examination    -  Primary    Relevant Orders    POCT urinalysis dipstick, automated (Completed)          Diagnoses         Codes Comments    Encounter for preventive health examination    -  Primary ICD-10-CM: Z00.00  ICD-9-CM: V70.0     Hypertension, essential     ICD-10-CM: I10  ICD-9-CM: 401.9     Hyperlipidemia, unspecified hyperlipidemia type     ICD-10-CM: E78.5  ICD-9-CM: 272.4     Osteopenia, unspecified location     ICD-10-CM: M85.80  ICD-9-CM: 733.90           Assessment & Plan   In for initial visit, transition of care, and annual preventive exam today  June 2023.  Also annual wellness visit today June 2023.  Overall health is good.  She had some mild hypertension.  Lipids are fairly average.  Minimal impaired glucose tolerance.  She takes very little medicine other than a lot of vitamins.  She does have some chronic anxiety and agoraphobia.  She had annual lab work including CBC, CMP, Lipids, A1c, TSH, free T4 , UA December 2022 which look great other than minimal elevation of the A1c.  We will plan to monitor every 6 months on blood pressure and IGT.  Glucose and lipids every 6 months.  She likes to check CBC, CMP, lipids, A1c, TSH and free T4 every 6 months.  Blood pressure is a bit high today but she checks it regularly at home and that runs normal at home.  History of whitecoat hypertension.  She knows Medicare does not cover annual preventive exams and she is okay with that.  She is fasting today.    Prevention counseling was performed today. The counseling performed was routine health maintenance topics including BMI and exercise.    The above information was reviewed again today 06/09/23.  It continues to be accurate as reflected above and is unchanged.  History, physical and review of systems all reviewed and are unchanged.  Medications were reviewed today and continue the current dosing.    PPE today includes face mask and eye shield.           Dragon disclaimer:   Part of this note may be an electronic transcription/translation of spoken language to printed text using the Dragon Dictation System.

## 2023-06-15 ENCOUNTER — PATIENT ROUNDING (BHMG ONLY) (OUTPATIENT)
Dept: INTERNAL MEDICINE | Facility: CLINIC | Age: 73
End: 2023-06-15
Payer: MEDICARE

## 2023-06-15 NOTE — PROGRESS NOTES
[place] 15, 2023    Hello, may I speak with Sandi Acevedo?    My name is Laura    I am  with [facility]  [address] [place] [id number] [phone].    Before we get started may I verify your date of birth? 1950    I am calling to officially welcome you to our practice and ask about your recent visit. Is this a good time to talk? no    Tell me about your visit with us. What things went well?  I left a voice mail for patient to call back.       We're always looking for ways to make our patients' experiences even better. Do you have recommendations on ways we may improve?  no    Overall were you satisfied with your first visit to our practice? yes       I appreciate you taking the time to speak with me today. Is there anything else I can do for you? no      Thank you, and have a great day.

## 2023-08-04 DIAGNOSIS — I10 HYPERTENSION, ESSENTIAL: ICD-10-CM

## 2023-08-04 RX ORDER — AMLODIPINE BESYLATE 5 MG/1
5 TABLET ORAL DAILY
Qty: 90 TABLET | Refills: 1 | Status: SHIPPED | OUTPATIENT
Start: 2023-08-04

## 2023-11-28 DIAGNOSIS — I10 HYPERTENSION, ESSENTIAL: ICD-10-CM

## 2023-11-29 RX ORDER — AMLODIPINE BESYLATE 2.5 MG/1
2.5 TABLET ORAL DAILY
Qty: 90 TABLET | Refills: 1 | Status: SHIPPED | OUTPATIENT
Start: 2023-11-29

## 2023-12-14 ENCOUNTER — TELEPHONE (OUTPATIENT)
Dept: INTERNAL MEDICINE | Facility: CLINIC | Age: 73
End: 2023-12-14
Payer: MEDICARE

## 2023-12-14 DIAGNOSIS — I10 HYPERTENSION, ESSENTIAL: ICD-10-CM

## 2023-12-14 RX ORDER — AMLODIPINE BESYLATE 5 MG/1
5 TABLET ORAL DAILY
Qty: 90 TABLET | Refills: 1 | Status: SHIPPED | OUTPATIENT
Start: 2023-12-14

## 2023-12-14 RX ORDER — AMLODIPINE BESYLATE 2.5 MG/1
2.5 TABLET ORAL DAILY
Qty: 90 TABLET | Refills: 1 | Status: SHIPPED | OUTPATIENT
Start: 2023-12-14

## 2023-12-14 NOTE — TELEPHONE ENCOUNTER
Caller: Sandi Acevedo    Relationship: Self    Best call back number: [phone]    What are your concerns: PATIENT STATES THAT SHE TAKES AMLODIPINE 2.5 AND 5.0 MG. PATIENT STATES THAT [facility] HAS THE AMLODIPINE 5.0 PRESCRIBER AS JANA BORGES. PLEASE CHANGE THIS TO DR. CAMARGO.

## 2023-12-15 ENCOUNTER — LAB (OUTPATIENT)
Dept: LAB | Facility: HOSPITAL | Age: 73
End: 2023-12-15
Payer: MEDICARE

## 2023-12-15 ENCOUNTER — OFFICE VISIT (OUTPATIENT)
Dept: INTERNAL MEDICINE | Facility: CLINIC | Age: 73
End: 2023-12-15
Payer: MEDICARE

## 2023-12-15 VITALS
SYSTOLIC BLOOD PRESSURE: 126 MMHG | RESPIRATION RATE: 16 BRPM | WEIGHT: 143 LBS | OXYGEN SATURATION: 98 % | DIASTOLIC BLOOD PRESSURE: 60 MMHG | BODY MASS INDEX: 26.31 KG/M2 | TEMPERATURE: 97.7 F | HEART RATE: 72 BPM | HEIGHT: 62 IN

## 2023-12-15 DIAGNOSIS — Z00.00 ENCOUNTER FOR ANNUAL WELLNESS EXAM IN MEDICARE PATIENT: ICD-10-CM

## 2023-12-15 DIAGNOSIS — M54.50 ACUTE LOW BACK PAIN WITHOUT SCIATICA, UNSPECIFIED BACK PAIN LATERALITY: Primary | ICD-10-CM

## 2023-12-15 DIAGNOSIS — I10 HYPERTENSION, ESSENTIAL: Chronic | ICD-10-CM

## 2023-12-15 DIAGNOSIS — E78.5 HYPERLIPIDEMIA, UNSPECIFIED HYPERLIPIDEMIA TYPE: Chronic | ICD-10-CM

## 2023-12-15 DIAGNOSIS — R73.02 IMPAIRED GLUCOSE TOLERANCE: ICD-10-CM

## 2023-12-15 LAB
CHOLEST SERPL-MCNC: 199 MG/DL (ref 0–200)
EXPIRATION DATE: ABNORMAL
GLUCOSE SERPL-MCNC: 95 MG/DL (ref 65–99)
GLUCOSE UR STRIP-MCNC: NEGATIVE MG/DL
HBA1C MFR BLD: 5.9 % (ref 4.8–5.6)
HDLC SERPL QL: 3.55
HDLC SERPL-MCNC: 56 MG/DL (ref 40–60)
LDLC SERPL CALC-MCNC: 131 MG/DL (ref 0–100)
LEUKOCYTE EST, POC: ABNORMAL
Lab: ABNORMAL
PH UR: 6 [PH] (ref 5–8)
PROT UR STRIP-MCNC: NEGATIVE MG/DL
SP GR UR: 1.01 (ref 1–1.03)
TRIGL SERPL-MCNC: 68 MG/DL (ref 0–150)
VLDLC SERPL-MCNC: 12 MG/DL (ref 5–40)

## 2023-12-15 PROCEDURE — 80061 LIPID PANEL: CPT | Performed by: INTERNAL MEDICINE

## 2023-12-15 PROCEDURE — 36415 COLL VENOUS BLD VENIPUNCTURE: CPT | Performed by: INTERNAL MEDICINE

## 2023-12-15 PROCEDURE — 81003 URINALYSIS AUTO W/O SCOPE: CPT | Performed by: INTERNAL MEDICINE

## 2023-12-15 PROCEDURE — 83036 HEMOGLOBIN GLYCOSYLATED A1C: CPT | Performed by: INTERNAL MEDICINE

## 2023-12-15 PROCEDURE — 1159F MED LIST DOCD IN RCRD: CPT | Performed by: INTERNAL MEDICINE

## 2023-12-15 PROCEDURE — 82947 ASSAY GLUCOSE BLOOD QUANT: CPT | Performed by: INTERNAL MEDICINE

## 2023-12-15 PROCEDURE — 1160F RVW MEDS BY RX/DR IN RCRD: CPT | Performed by: INTERNAL MEDICINE

## 2023-12-15 PROCEDURE — 3074F SYST BP LT 130 MM HG: CPT | Performed by: INTERNAL MEDICINE

## 2023-12-15 PROCEDURE — 3078F DIAST BP <80 MM HG: CPT | Performed by: INTERNAL MEDICINE

## 2023-12-15 NOTE — PROGRESS NOTES
The ABCs of the Annual Wellness Visit  Subsequent Medicare Wellness Visit    Subjective      Sandi Acevedo is a 73 y.o. female who presents for a Subsequent Medicare Wellness Visit.    The following portions of the patient's history were reviewed and   updated as appropriate: allergies, current medications, past family history, past medical history, past social history, past surgical history, and problem list.    Compared to one year ago, the patient feels her physical   health is better.    Compared to one year ago, the patient feels her mental   health is better.    Recent Hospitalizations:  She was not admitted to the hospital during the last year.       Current Medical Providers:  Patient Care Team:  Sheldon Dumont MD as PCP - General (Internal Medicine)  Eddie Sarah MD as Consulting Physician (Ophthalmology)  Jordon Rivera DPM as Consulting Physician (Podiatry)  Topher Millard MD as Consulting Physician (Vascular Surgery)    Outpatient Medications Prior to Visit   Medication Sig Dispense Refill    amLODIPine (NORVASC) 2.5 MG tablet Take 1 tablet by mouth Daily. Along with 5 mg tablet daily 90 tablet 1    amLODIPine (NORVASC) 5 MG tablet Take 1 tablet by mouth Daily. Along with a 2.5 mg tablet daily. 90 tablet 1    ASPIRIN 81 PO Take 1 tablet by mouth Daily.      B COMPLEX-C ER PO Take 1 tablet by mouth 3 (Three) Times a Week.      calcium carbonate (OS-ROCIO) 600 MG tablet Take 1 tablet by mouth Daily.      cetirizine (zyrTEC) 10 MG tablet Take 1 tablet by mouth Daily As Needed for Allergies.      cholecalciferol (VITAMIN D3) 1000 UNITS tablet Take 1 tablet by mouth Daily.      coenzyme Q10 100 MG capsule Take 1 capsule by mouth Daily.      Flaxseed, Linseed, (FLAXSEED OIL PO) Take 1,200 mg by mouth Daily.      fluticasone (FLONASE) 50 MCG/ACT nasal spray 2 sprays by Each Nare route Daily. 48 mL 2    latanoprost (XALATAN) 0.005 % ophthalmic solution INSTIL 1 DROP IN EYE(S) AT BEDTIME      LORazepam  "(Ativan) 0.5 MG tablet Take 1 tablet by mouth Every 8 (Eight) Hours As Needed for Anxiety. 30 tablet 0    Magnesium Oxide 400 (240 Mg) MG tablet Take 1 tablet by mouth Daily.      Probiotic Product (PROBIOTIC DAILY PO) Take 1 tablet by mouth Daily.      Red Yeast Rice 600 MG capsule Take 2 capsules by mouth Daily.      Vitamin E 180 MG (400 UNIT) capsule capsule Take 1 capsule by mouth Daily.      Zinc 50 MG capsule Take 25 mg by mouth Daily.       No facility-administered medications prior to visit.       No opioid medication identified on active medication list. I have reviewed chart for other potential  high risk medication/s and harmful drug interactions in the elderly.        Aspirin is on active medication list. Aspirin use is indicated based on review of current medical condition/s. Pros and cons of this therapy have been discussed today. Benefits of this medication outweigh potential harm.  Patient has been encouraged to continue taking this medication.  .      Patient Active Problem List   Diagnosis    Hypertension, essential    Glaucoma associated with ocular disorder (Conrd)    Agoraphobia    Urinary incontinence in female    Osteopenia    FH: uterine cancer    FH: CAD (coronary artery disease)    Vitamin D deficiency disease    Panic anxiety syndrome (Xanax)    Osteoarthritis    Hyperlipidemia    Sciatica of left side    Impaired glucose tolerance     Advance Care Planning   Advance Care Planning     Advance Directive is not on file.  ACP discussion was held with the patient during this visit. Patient has an advance directive (not in EMR), copy requested.     Objective    Vitals:    12/15/23 0840   BP: 126/60   Pulse: 72   Resp: 16   Temp: 97.7 °F (36.5 °C)   TempSrc: Temporal   SpO2: 98%   Weight: 64.9 kg (143 lb)   Height: 157.5 cm (62\")     Estimated body mass index is 26.16 kg/m² as calculated from the following:    Height as of this encounter: 157.5 cm (62\").    Weight as of this encounter: 64.9 kg " (143 lb).           Does the patient have evidence of cognitive impairment?   No    Lab Results   Component Value Date    TRIG 68 12/15/2023    HDL 56 12/15/2023     (H) 12/15/2023    VLDL 12 12/15/2023    HGBA1C 5.90 (H) 12/15/2023          HEALTH RISK ASSESSMENT    Smoking Status:  Social History     Tobacco Use   Smoking Status Never   Smokeless Tobacco Never     Alcohol Consumption:  Social History     Substance and Sexual Activity   Alcohol Use No     Fall Risk Screen:    LISSETTE Fall Risk Assessment was completed, and patient is at LOW risk for falls.Assessment completed on:2023    Depression Screenin/15/2023     9:11 AM   PHQ-2/PHQ-9 Depression Screening   Little Interest or Pleasure in Doing Things 0-->not at all   Feeling Down, Depressed or Hopeless 0-->not at all   PHQ-9: Brief Depression Severity Measure Score 0       Health Habits and Functional and Cognitive Screenin/15/2023     9:00 AM   Functional & Cognitive Status   Do you have difficulty preparing food and eating? No   Do you have difficulty bathing yourself, getting dressed or grooming yourself? No   Do you have difficulty using the toilet? No   Do you have difficulty moving around from place to place? No   Do you have trouble with steps or getting out of a bed or a chair? No   Current Diet Well Balanced Diet   Dental Exam Up to date   Eye Exam Up to date   Exercise (times per week) 4 times per week   Current Exercises Include Swim Aerobics Class   Do you need help using the phone?  No   Are you deaf or do you have serious difficulty hearing?  No   Do you need help to go to places out of walking distance? No   Do you need help shopping? No   Do you need help preparing meals?  No   Do you need help with housework?  No   Do you need help with laundry? No   Do you need help taking your medications? No   Do you need help managing money? No   Do you ever drive or ride in a car without wearing a seat belt? No   Have you  felt unusual stress, anger or loneliness in the last month? No   Who do you live with? Alone   If you need help, do you have trouble finding someone available to you? No       Age-appropriate Screening Schedule:  Refer to the list below for future screening recommendations based on patient's age, sex and/or medical conditions. Orders for these recommended tests are listed in the plan section. The patient has been provided with a written plan.    Health Maintenance   Topic Date Due    ANNUAL WELLNESS VISIT  06/17/2023    COVID-19 Vaccine (3 - 2023-24 season) 12/17/2023 (Originally 9/1/2023)    TDAP/TD VACCINES (2 - Td or Tdap) 12/30/2023 (Originally 6/18/2018)    INFLUENZA VACCINE  03/31/2024 (Originally 8/1/2023)    ZOSTER VACCINE (2 of 2) 01/01/2050 (Originally 6/23/2017)    BMI FOLLOWUP  06/09/2024    LIPID PANEL  12/15/2024    DXA SCAN  12/30/2024    COLORECTAL CANCER SCREENING  11/06/2029    HEPATITIS C SCREENING  Completed    Pneumococcal Vaccine 65+  Completed                  CMS Preventative Services Quick Reference  Risk Factors Identified During Encounter:    Fall Risk-High or Moderate: Information on Fall Prevention Shared in After Visit Summary    The above risks/problems have been discussed with the patient.  Pertinent information has been shared with the patient in the After Visit Summary.    Diagnoses and all orders for this visit:    1. Acute low back pain without sciatica, unspecified back pain laterality (Primary)  -     POC Urinalysis Dipstick, Automated    2. Hypertension, essential    3. Hyperlipidemia, unspecified hyperlipidemia type  -     Lipid Panel With / Chol / HDL Ratio    4. Impaired glucose tolerance  -     Hemoglobin A1c  -     Glucose, Random    5. Encounter for annual wellness exam in Medicare patient        Follow Up:   Next Medicare Wellness visit to be scheduled in 1 year.      An After Visit Summary and PPPS were made available to the patient.

## 2023-12-15 NOTE — PROGRESS NOTES
Subjective   Sandi Acevedo is a 73 y.o. female.     Chief Complaint   Patient presents with    Hypertension    Hyperglycemia    Medicare Wellness-subsequent    Urinary Tract Infection         History of Present Illness  In today for 6-month recheck of hypertension, hyperlipidemia and impaired glucose tolerance.  Hypertension  This is a chronic problem. Pertinent negatives include no chest pain, headaches, palpitations or shortness of breath.   Hyperglycemia  This is a chronic problem. Pertinent negatives include no abdominal pain, chest pain, coughing or headaches.   Urinary Tract Infection          The following portions of the patient's history were reviewed and updated as appropriate: allergies, current medications, past social history and problem list.    Outpatient Medications Marked as Taking for the 12/15/23 encounter (Office Visit) with Sheldon Dumont MD   Medication Sig Dispense Refill    amLODIPine (NORVASC) 2.5 MG tablet Take 1 tablet by mouth Daily. Along with 5 mg tablet daily 90 tablet 1    amLODIPine (NORVASC) 5 MG tablet Take 1 tablet by mouth Daily. Along with a 2.5 mg tablet daily. 90 tablet 1    ASPIRIN 81 PO Take 1 tablet by mouth Daily.      B COMPLEX-C ER PO Take 1 tablet by mouth 3 (Three) Times a Week.      calcium carbonate (OS-ROCIO) 600 MG tablet Take 1 tablet by mouth Daily.      cetirizine (zyrTEC) 10 MG tablet Take 1 tablet by mouth Daily As Needed for Allergies.      cholecalciferol (VITAMIN D3) 1000 UNITS tablet Take 1 tablet by mouth Daily.      coenzyme Q10 100 MG capsule Take 1 capsule by mouth Daily.      Flaxseed, Linseed, (FLAXSEED OIL PO) Take 1,200 mg by mouth Daily.      fluticasone (FLONASE) 50 MCG/ACT nasal spray 2 sprays by Each Nare route Daily. 48 mL 2    latanoprost (XALATAN) 0.005 % ophthalmic solution INSTIL 1 DROP IN EYE(S) AT BEDTIME      LORazepam (Ativan) 0.5 MG tablet Take 1 tablet by mouth Every 8 (Eight) Hours As Needed for Anxiety. 30 tablet 0     Magnesium Oxide 400 (240 Mg) MG tablet Take 1 tablet by mouth Daily.      Probiotic Product (PROBIOTIC DAILY PO) Take 1 tablet by mouth Daily.      Red Yeast Rice 600 MG capsule Take 2 capsules by mouth Daily.      Vitamin E 180 MG (400 UNIT) capsule capsule Take 1 capsule by mouth Daily.      Zinc 50 MG capsule Take 25 mg by mouth Daily.         Review of Systems   Respiratory:  Negative for cough, shortness of breath and wheezing.    Cardiovascular:  Negative for chest pain, palpitations and leg swelling.   Gastrointestinal:  Negative for abdominal pain, constipation and diarrhea.   Neurological:  Negative for headaches.       Objective   Vitals:    12/15/23 0840   BP: 126/60   Pulse: 72   Resp: 16   Temp: 97.7 °F (36.5 °C)   SpO2: 98%      Wt Readings from Last 3 Encounters:   12/15/23 64.9 kg (143 lb)   06/09/23 67.4 kg (148 lb 9.6 oz)   12/30/22 66.7 kg (147 lb)    Body mass index is 26.16 kg/m².      Physical Exam  Constitutional:       Appearance: Normal appearance. She is well-developed.   Neck:      Thyroid: No thyromegaly.   Cardiovascular:      Rate and Rhythm: Normal rate and regular rhythm.      Heart sounds: Normal heart sounds. No murmur heard.     No gallop.   Pulmonary:      Effort: Pulmonary effort is normal. No respiratory distress.      Breath sounds: Normal breath sounds. No wheezing or rales.   Neurological:      Mental Status: She is alert.           Problems Addressed this Visit          Cardiac and Vasculature    Hypertension, essential (Chronic)    Hyperlipidemia (Chronic)       Endocrine and Metabolic    Impaired glucose tolerance     Other Visit Diagnoses       Acute low back pain without sciatica, unspecified back pain laterality    -  Primary    Relevant Orders    POC Urinalysis Dipstick, Automated (Completed)          Diagnoses         Codes Comments    Acute low back pain without sciatica, unspecified back pain laterality    -  Primary ICD-10-CM: M54.50  ICD-9-CM: 724.2      Hypertension, essential     ICD-10-CM: I10  ICD-9-CM: 401.9     Hyperlipidemia, unspecified hyperlipidemia type     ICD-10-CM: E78.5  ICD-9-CM: 272.4     Impaired glucose tolerance     ICD-10-CM: R73.02  ICD-9-CM: 790.22           Assessment & Plan   In today for 6-month recheck of hypertension, hyperlipidemia and impaired glucose tolerance.  She is completely asymptomatic.  Wants a recheck on her urine since she spends 4 days a week in the pool.  Urine looks fine today.  Advised there is no reason to worry about checking her urines due to her pool activities.  Blood pressure is excellent today.  Annual preventive exam and lab work will be June 2024.  Annual wellness visit today December 2023.  She does not wish to continue with preventive exams since Medicare does not cover them.  She is fasting today.    The above information was reviewed again today 12/15/23.  It continues to be accurate as reflected above and is unchanged.  History, physical and review of systems all reviewed and are unchanged.  Medications were reviewed today and continue the current dosing.               Dragon disclaimer:   Part of this note may be an electronic transcription/translation of spoken language to printed text using the Dragon Dictation System.

## 2024-04-16 ENCOUNTER — OFFICE VISIT (OUTPATIENT)
Dept: CARDIOLOGY | Age: 74
End: 2024-04-16
Payer: MEDICARE

## 2024-04-16 VITALS
BODY MASS INDEX: 27.23 KG/M2 | HEART RATE: 85 BPM | HEIGHT: 62 IN | DIASTOLIC BLOOD PRESSURE: 95 MMHG | WEIGHT: 148 LBS | SYSTOLIC BLOOD PRESSURE: 182 MMHG

## 2024-04-16 DIAGNOSIS — I10 PRIMARY HYPERTENSION: Primary | ICD-10-CM

## 2024-04-16 DIAGNOSIS — R93.1 ABNORMAL ECHOCARDIOGRAM: ICD-10-CM

## 2024-04-16 DIAGNOSIS — I10 HYPERTENSION, ESSENTIAL: ICD-10-CM

## 2024-04-16 DIAGNOSIS — R94.31 ABNORMAL EKG: ICD-10-CM

## 2024-04-16 DIAGNOSIS — R94.31 ABNORMAL ELECTROCARDIOGRAM (ECG) (EKG): ICD-10-CM

## 2024-04-16 DIAGNOSIS — I51.89 DIASTOLIC DYSFUNCTION: ICD-10-CM

## 2024-04-16 DIAGNOSIS — E78.00 PURE HYPERCHOLESTEROLEMIA: ICD-10-CM

## 2024-04-16 DIAGNOSIS — I11.0 HYPERTENSIVE HEART DISEASE WITH HEART FAILURE: ICD-10-CM

## 2024-04-16 RX ORDER — AMLODIPINE AND OLMESARTAN MEDOXOMIL 5; 40 MG/1; MG/1
1 TABLET ORAL DAILY
Qty: 90 TABLET | Refills: 3 | Status: SHIPPED | OUTPATIENT
Start: 2024-04-16

## 2024-04-16 NOTE — PROGRESS NOTES
HTN - LAST SEEN IN    Subjective:        [facility]  Cardiology Consult Note    Patient Identification:  Name: Sandi Acevedo  Age: 73 y.o.  Sex: female  :  1950  MRN: [id number]             CC  Uncontrolled htn      History of Present Illness:   73-year-old female with a history of the hypertension last seen many years ago was found to have the abnormal echocardiogram as well as abnormal EKG here for the further cardiac evaluation shortness of breath on heavy exertion only    Comorbid cardiac risk factors:     Past Medical History:  Past Medical History:   Diagnosis Date    Allergic     Anxiety     Arthritis     Cataract     COPD (chronic obstructive pulmonary disease)     Mild obstructive defect noted     Emphysema of lung     Glaucoma 2022    Using eye drops    Hypertension     Inflammatory bowel disease     Low back pain     Osteopenia     Plantar fasciitis of left foot 2018    Urinary tract infection     Visual impairment      Past Surgical History:  Past Surgical History:   Procedure Laterality Date    CYST REMOVAL Right 2015    Neck- Raya Bauer    EYE SURGERY Left 1955    to repair lazy eye    LAPAROSCOPIC TUBAL LIGATION Bilateral     TUBAL ABDOMINAL LIGATION        Allergies:  Allergies   Allergen Reactions    Ace Inhibitors Cough    Benazepril Cough     Home Meds:  (Not in a hospital admission)    Current Meds:     Current Outpatient Medications:     ASPIRIN 81 PO, Take 1 tablet by mouth Daily., Disp: , Rfl:     B COMPLEX-C ER PO, Take 1 tablet by mouth 3 (Three) Times a Week., Disp: , Rfl:     calcium carbonate (OS-ROCIO) 600 MG tablet, Take 1 tablet by mouth Daily., Disp: , Rfl:     cetirizine (zyrTEC) 10 MG tablet, Take 1 tablet by mouth Daily As Needed for Allergies., Disp: , Rfl:     cholecalciferol (VITAMIN D3) 1000 UNITS tablet, Take 1 tablet by mouth Daily., Disp: , Rfl:     coenzyme Q10 100 MG capsule, Take 1 capsule by mouth Daily., Disp: ,  Rfl:     latanoprost (XALATAN) 0.005 % ophthalmic solution, INSTIL 1 DROP IN EYE(S) AT BEDTIME, Disp: , Rfl:     Magnesium Oxide 400 (240 Mg) MG tablet, Take 1 tablet by mouth Daily., Disp: , Rfl:     Probiotic Product (PROBIOTIC DAILY PO), Take 1 tablet by mouth Daily., Disp: , Rfl:     Red Yeast Rice 600 MG capsule, Take 2 capsules by mouth Daily., Disp: , Rfl:     Vitamin E 180 MG (400 UNIT) capsule capsule, Take 1 capsule by mouth Daily., Disp: , Rfl:     Zinc 50 MG capsule, Take 25 mg by mouth Daily., Disp: , Rfl:     amlodipine-olmesartan (Macie) 5-40 MG per tablet, Take 1 tablet by mouth Daily., Disp: 90 tablet, Rfl: 3  Social History:   Social History     Tobacco Use    Smoking status: Never    Smokeless tobacco: Never   Substance Use Topics    Alcohol use: No      Family History:  Family History   Problem Relation Age of Onset    Arthritis Mother             Cancer Mother         Esophagus    Glaucoma Mother     Hypertension Mother             Stroke Mother             Osteoporosis Mother     Ovarian cancer Mother     Heart disease Mother         Congestive heart    Vision loss Mother             Heart disease Father     Stroke Father             Early death Father         Ceraboral hemmorage    Hypertension Father             Thyroid disease Sister     Hyperlipidemia Brother     Hypertension Brother     Asthma Daughter     Heart disease Maternal Aunt     Asthma Daughter         She is on meds    Heart disease Maternal Aunt         Congestive heart     Heart disease Paternal Aunt         Stroke    Hyperlipidemia Brother         On meds    Hypertension Brother     Stroke Maternal Aunt         On meds    Thyroid disease Sister         On meds        Review of Systems    Constitutional: No weakness,fatigue, fever, rigors, chills   Eyes: No vision changes, eye pain   ENT/oropharynx: No difficulty swallowing, sore throat, epistaxis, changes in hearing    Cardiovascular: No chest pain, chest tightness, palpitations, paroxysmal nocturnal dyspnea, orthopnea, diaphoresis, dizziness / syncopal episode   Respiratory: No shortness of breath, dyspnea on exertion, cough, wheezing hemoptysis   Gastrointestinal: No abdominal pain, nausea, vomiting, diarrhea, bloody stools   Genitourinary: No hematuria, dysuria   Neurological: No headache, tremors, numbness,  one-sided weakness    Musculoskeletal: No cramps, myalgias,  joint pain, joint swelling   Integument: No rash, edema           Constitutional:       Physical Exam   General:  Appears in no acute distress  Eyes: PERTL,  HEENT:  No JVD. Thyroid not visibly enlarged. No mucosal pallor or cyanosis  Respiratory: Respirations regular and unlabored at rest. BBS with good air entry in all fields. No crackles, rubs or wheezes auscultated  Cardiovascular: S1S2 Regular rate and rhythm. No murmur, rub or gallop auscultated. No carotid bruits. DP/PT pulses    . No pretibial pitting edema  Gastrointestinal: Abdomen soft, flat, non tender. Bowel sounds present. No hepatosplenomegaly. No ascites  Musculoskeletal: ROTHMAN x4. No abnormal movements  Extremities: No digital clubbing or cyanosis  Skin: Color pink. Skin warm and dry to touch. No rashes  No xanthoma  Neuro: AAO x3 CN II-XII grossly intact            ECG 12 Lead    Date/Time: 4/16/2024 2:39 PM  Performed by: Gary Coughlin MD    Authorized by: Gary Coughlin MD  Comparison: compared with previous ECG   Similar to previous ECG  Rhythm: sinus rhythm  ST Flattening: all  Other findings: poor R wave progression    Clinical impression: abnormal EKG              Cardiographics  ECG:     Telemetry:    Echocardiogram:   Results for orders placed during the hospital encounter of 06/27/16    Adult transthoracic echo complete    Interpretation Summary  · All left ventricular wall segments contract normally.  · Left ventricular diastolic dysfunction (grade I) consistent with  impaired relaxation.  · Left Ventricle: Calculated EF = 61.8%  · There is no evidence of pericardial effusion        Imaging  Chest X-ray:     Lab Review                               Assessment:/ Recommendations / Plan:                  ICD-10-CM ICD-9-CM   1. Primary hypertension  I10 401.9   2. Pure hypercholesterolemia  E78.00 272.0   3. Hypertension, essential  I10 401.9   4. Diastolic dysfunction  I51.89 429.9   5. Abnormal EKG  R94.31 794.31   6. Abnormal echocardiogram  R93.1 793.2     1. Primary hypertension  Patient understands importance of blood pressure check at home which patient does regularly and the blood pressures are well under control to the level of less than 140/90      2. Pure hypercholesterolemia  Continue current treatment    3. Hypertension, essential  Continue current treatment    4. Diastolic dysfunction  Considering patient's medical condition as well as the risk factors, patient will require echocardiogram for further evaluation for the LV function, four-chamber evaluation, including the pressures, valvular function and  pericardial disease and pericardial effusion      5. Abnormal EKG  Considering the patient's symptoms as well as clinical situation and  EKG findings, along with cardiac risk factors, ischemic workup is necessary to rule out ischemic cardiomyopathy, stress induced arrhythmias, and functional capacity for diagnosis as well as prognostic consideration      6. Abnormal echocardiogram  Considering the patient's symptoms as well as clinical situation and  EKG findings, along with cardiac risk factors, ischemic workup is necessary to rule out ischemic cardiomyopathy, stress induced arrhythmias, and functional capacity for diagnosis as well as prognostic consideration      Dc norvas    Start 5/40  bean    Echo, jacque    Bmp 1 wk before    Labs/tests ordered for am      Gary Coughlin MD  4/26/2024, 16:23 EDT      EMR Dragon/Transcription:   Dictated utilizing Dragon  dictation

## 2024-04-18 ENCOUNTER — TELEPHONE (OUTPATIENT)
Dept: INTERNAL MEDICINE | Facility: CLINIC | Age: 74
End: 2024-04-18
Payer: MEDICARE

## 2024-04-18 NOTE — TELEPHONE ENCOUNTER
Name: Sandi Acevedo    Relationship: Self    HUB PROVIDED THE RELAY MESSAGE FROM THE OFFICE   PATIENT VOICED UNDERSTANDING AND HAS NO FURTHER QUESTIONS AT THIS TIME    ADDITIONAL INFORMATION:   PATIENT STATES SHE WILL CALL BACK WHEN SHE WANTS THE REFERRAL SENT TO CARDIOLOGY.

## 2024-04-18 NOTE — TELEPHONE ENCOUNTER
Caller: Sandi Acevedo    Relationship: Self    Best call back number: [phone]       What was the call regarding: PATIENT STATES THAT HER CARDIOLOGIST HAS MOVED AND SHE WOULD LIKE A RECOMMENDATION FOR ANOTHER ONE IN THE SAME LOCATION AS DR. CAMARGO OR NEAR THE HOSPITAL. SHE DOES NOT NEED A REFERRAL. JUST A RECOMMENDATION.

## 2024-04-18 NOTE — TELEPHONE ENCOUNTER
"Left Vm to return call.  Relay     \"Dr Dumont recommends Dr Kirk with [facility] CARDIOLOGY  [address], [address]  [place], [place] [address] [phone]  You will need a referral to be scheduled. So please let us know when you are ready for that referral & we will be happy to place it.\"                  "

## 2024-04-19 ENCOUNTER — TELEPHONE (OUTPATIENT)
Dept: CARDIOLOGY | Facility: CLINIC | Age: 74
End: 2024-04-19

## 2024-04-19 ENCOUNTER — TELEPHONE (OUTPATIENT)
Dept: CARDIOLOGY | Facility: CLINIC | Age: 74
End: 2024-04-19
Payer: MEDICARE

## 2024-04-19 NOTE — TELEPHONE ENCOUNTER
Caller: Sandi Acevedo    Relationship: Self    Best call back number: [phone]     Who is your current provider: DR JUARES     Is your current provider offboarding? NO    Who would you like your new provider to be: DR BENNETT    What are your reasons for transferring care: LOCATION     Additional notes: PT IS CURRENTLY SEEING DR JUARES BUT DUE TO HIS LOCATION AND CLOSING OF [facility] [place] LOC[facility] - PT STATES SHE WAS SEEN RECENTLY AND DISCUSSED CHANGING PATIENTS BP MEDICATION - PT STATES SHE IS STILL HAVING ISSUES AND WOULD LIKE TO FOLLOW UP SOONER RATHER THAN LATER

## 2024-04-19 NOTE — TELEPHONE ENCOUNTER
Caller: Sandi Acevedo    Relationship: Self    Best call back number: [phone]     Who is your current provider: DR JUARES     Is your current provider offboarding? NO    Who would you like your new provider to be: DR BENNETT    What are your reasons for transferring care: LOCATION     Additional notes: PT IS CURRENTLY SEEING DR JUARES BUT DUE TO HIS LOCATION AND CLOSING OF [facility] [place] [facility] - PT STATES SHE WAS SEEN RECENTLY AND DISCUSSED CHANGING PATIENTS BP MEDICATION - PT STATES SHE IS STILL HAVING ISSUES AND WOULD LIKE TO FOLLOW UP SOONER RATHER THAN LATER

## 2024-04-23 ENCOUNTER — TELEPHONE (OUTPATIENT)
Dept: INTERNAL MEDICINE | Facility: CLINIC | Age: 74
End: 2024-04-23
Payer: MEDICARE

## 2024-04-23 DIAGNOSIS — I10 HYPERTENSION, ESSENTIAL: Primary | ICD-10-CM

## 2024-04-23 NOTE — TELEPHONE ENCOUNTER
Caller: Sandi Acevedo    Relationship: Self    Best call back number: [phone]    What is the medical concern/diagnosis: HEART    What specialty or service is being requested: CARDIOLOGY    What is the provider, practice or medical service name: DR. BENNETT    What is the office location: [facility]    What is the office phone number: [phone]     Any additional details: STATED THAT THEY ARE NEEDING TO HAVE A REFERRAL SENT OVER FOR INSURANCE PURPOSES JUST TO BE ON THE SAFE SIDES. PLEASE CALL AND ADVISE

## 2024-04-26 PROBLEM — R94.31 ABNORMAL EKG: Status: ACTIVE | Noted: 2024-04-26

## 2024-04-26 PROBLEM — I51.89 DIASTOLIC DYSFUNCTION: Status: ACTIVE | Noted: 2024-04-26

## 2024-04-26 PROBLEM — R93.1 ABNORMAL ECHOCARDIOGRAM: Status: ACTIVE | Noted: 2024-04-26

## 2024-05-09 ENCOUNTER — OFFICE VISIT (OUTPATIENT)
Dept: CARDIOLOGY | Facility: CLINIC | Age: 74
End: 2024-05-09
Payer: MEDICARE

## 2024-05-09 VITALS
SYSTOLIC BLOOD PRESSURE: 150 MMHG | HEIGHT: 62 IN | HEART RATE: 98 BPM | BODY MASS INDEX: 26.76 KG/M2 | DIASTOLIC BLOOD PRESSURE: 98 MMHG | WEIGHT: 145.4 LBS | OXYGEN SATURATION: 98 %

## 2024-05-09 DIAGNOSIS — E78.00 PURE HYPERCHOLESTEROLEMIA: ICD-10-CM

## 2024-05-09 DIAGNOSIS — I10 PRIMARY HYPERTENSION: Primary | ICD-10-CM

## 2024-05-09 PROCEDURE — 99214 OFFICE O/P EST MOD 30 MIN: CPT | Performed by: INTERNAL MEDICINE

## 2024-05-09 PROCEDURE — 3077F SYST BP >= 140 MM HG: CPT | Performed by: INTERNAL MEDICINE

## 2024-05-09 PROCEDURE — 3080F DIAST BP >= 90 MM HG: CPT | Performed by: INTERNAL MEDICINE

## 2024-05-09 RX ORDER — AMLODIPINE BESYLATE 2.5 MG/1
2.5 TABLET ORAL DAILY
Start: 2024-05-09

## 2024-05-09 RX ORDER — AMLODIPINE BESYLATE 5 MG/1
5 TABLET ORAL DAILY
Start: 2024-05-09

## 2024-05-10 ENCOUNTER — PATIENT ROUNDING (BHMG ONLY) (OUTPATIENT)
Dept: CARDIOLOGY | Facility: HOSPITAL | Age: 74
End: 2024-05-10
Payer: MEDICARE

## 2024-05-13 ENCOUNTER — TELEPHONE (OUTPATIENT)
Dept: CARDIOLOGY | Facility: CLINIC | Age: 74
End: 2024-05-13

## 2024-05-13 NOTE — TELEPHONE ENCOUNTER
Caller: Sandi Acevedo    Relationship: Self    Best call back number: [phone]    What is the best time to reach you: ANYTIME    Who are you requesting to speak with (clinical staff, provider,  specific staff member): MARIANA IVAN        What was the call regarding: PT WAS IN OFFICE 05/10/2024 AND WOULD LIKE MONCHO TO CALL HER BACK ABOUT CONVERSATION SHE HAD WITH HER  FRIDAY

## 2024-05-17 ENCOUNTER — CLINICAL SUPPORT (OUTPATIENT)
Dept: CARDIOLOGY | Facility: CLINIC | Age: 74
End: 2024-05-17
Payer: MEDICARE

## 2024-05-17 RX ORDER — AMLODIPINE BESYLATE 10 MG/1
10 TABLET ORAL DAILY
Qty: 90 TABLET | Refills: 3 | Status: SHIPPED | OUTPATIENT
Start: 2024-05-17

## 2024-05-17 NOTE — PROGRESS NOTES
Patient presents today for a BP check and to check her automatic cuff against our manual cuff.   BP with our cuff is 152/78.  Patient's cuff 157/80 HR 81.  Patient may have gotten high readings due to improper placement of her cuff.  I reviewed with patient the proper placement of her cuff.  Betty May NP reviewed the BP readings and advised that patient increase the dose of Amlodipine from 7.5 mg daily to 10 mg daily.  She can use the current tablets she has to equal 10 mg dose and then fill the 10 mg tablets sent in to her pharmacy.  Patient is to continue to log her BP readings for another two weeks and follow up in two weeks for another BP check.  Patient verbalized understanding of all instructions given. / LYNDA

## 2024-05-31 ENCOUNTER — CLINICAL SUPPORT (OUTPATIENT)
Dept: CARDIOLOGY | Facility: CLINIC | Age: 74
End: 2024-05-31
Payer: MEDICARE

## 2024-05-31 VITALS — SYSTOLIC BLOOD PRESSURE: 137 MMHG | DIASTOLIC BLOOD PRESSURE: 86 MMHG | HEART RATE: 81 BPM | OXYGEN SATURATION: 96 %

## 2024-05-31 DIAGNOSIS — I10 PRIMARY HYPERTENSION: Primary | ICD-10-CM

## 2024-05-31 DIAGNOSIS — Z01.30 BP CHECK: ICD-10-CM

## 2024-05-31 NOTE — ADDENDUM NOTE
Addended by: JOSÉ LUIS HUITRON on: 5/31/2024 10:09 AM     Modules accepted: Level of Service     You can access the FollowMyHealth Patient Portal offered by [facility] by registering at the following website: [email or web address]. By joining [facility]’s FollowMyHealth portal, you will also be able to view your health information using other applications (apps) compatible with our system.

## 2024-05-31 NOTE — PROGRESS NOTES
Patient was cleared to leave AMA keep November appointment to follow-up with me.  She is also seeing her PCP next month in approximately 3 weeks

## 2024-06-21 ENCOUNTER — OFFICE VISIT (OUTPATIENT)
Dept: INTERNAL MEDICINE | Facility: CLINIC | Age: 74
End: 2024-06-21
Payer: MEDICARE

## 2024-06-21 ENCOUNTER — LAB (OUTPATIENT)
Dept: LAB | Facility: HOSPITAL | Age: 74
End: 2024-06-21
Payer: MEDICARE

## 2024-06-21 VITALS
TEMPERATURE: 97.8 F | SYSTOLIC BLOOD PRESSURE: 144 MMHG | RESPIRATION RATE: 16 BRPM | BODY MASS INDEX: 27.05 KG/M2 | WEIGHT: 147 LBS | HEIGHT: 62 IN | DIASTOLIC BLOOD PRESSURE: 66 MMHG | HEART RATE: 61 BPM | OXYGEN SATURATION: 97 %

## 2024-06-21 DIAGNOSIS — R73.02 IMPAIRED GLUCOSE TOLERANCE: ICD-10-CM

## 2024-06-21 DIAGNOSIS — I10 HYPERTENSION, ESSENTIAL: Primary | ICD-10-CM

## 2024-06-21 DIAGNOSIS — E78.5 HYPERLIPIDEMIA, UNSPECIFIED HYPERLIPIDEMIA TYPE: Chronic | ICD-10-CM

## 2024-06-21 LAB
ALBUMIN SERPL-MCNC: 4.3 G/DL (ref 3.5–5.2)
ALBUMIN/GLOB SERPL: 1.7 G/DL
ALP SERPL-CCNC: 101 U/L (ref 39–117)
ALT SERPL W P-5'-P-CCNC: 16 U/L (ref 1–33)
ANION GAP SERPL CALCULATED.3IONS-SCNC: 8.1 MMOL/L (ref 5–15)
AST SERPL-CCNC: 23 U/L (ref 1–32)
BASOPHILS # BLD AUTO: 0.06 10*3/MM3 (ref 0–0.2)
BASOPHILS NFR BLD AUTO: 0.8 % (ref 0–1.5)
BILIRUB SERPL-MCNC: 0.7 MG/DL (ref 0–1.2)
BUN SERPL-MCNC: 15 MG/DL (ref 8–23)
BUN/CREAT SERPL: 16.3 (ref 7–25)
CALCIUM SPEC-SCNC: 9.4 MG/DL (ref 8.6–10.5)
CHLORIDE SERPL-SCNC: 106 MMOL/L (ref 98–107)
CHOLEST SERPL-MCNC: 189 MG/DL (ref 0–200)
CLARITY, POC: CLEAR
CO2 SERPL-SCNC: 25.9 MMOL/L (ref 22–29)
COLOR UR: YELLOW
CREAT SERPL-MCNC: 0.92 MG/DL (ref 0.57–1)
DEPRECATED RDW RBC AUTO: 42.9 FL (ref 37–54)
EGFRCR SERPLBLD CKD-EPI 2021: 65.1 ML/MIN/1.73
EOSINOPHIL # BLD AUTO: 0.18 10*3/MM3 (ref 0–0.4)
EOSINOPHIL NFR BLD AUTO: 2.5 % (ref 0.3–6.2)
ERYTHROCYTE [DISTWIDTH] IN BLOOD BY AUTOMATED COUNT: 13.1 % (ref 12.3–15.4)
EXPIRATION DATE: ABNORMAL
GLOBULIN UR ELPH-MCNC: 2.5 GM/DL
GLUCOSE SERPL-MCNC: 101 MG/DL (ref 65–99)
GLUCOSE UR STRIP-MCNC: NEGATIVE MG/DL
HBA1C MFR BLD: 6.1 % (ref 4.8–5.6)
HCT VFR BLD AUTO: 43.9 % (ref 34–46.6)
HDLC SERPL QL: 3.5
HDLC SERPL-MCNC: 54 MG/DL (ref 40–60)
HGB BLD-MCNC: 14.2 G/DL (ref 12–15.9)
IMM GRANULOCYTES # BLD AUTO: 0.02 10*3/MM3 (ref 0–0.05)
IMM GRANULOCYTES NFR BLD AUTO: 0.3 % (ref 0–0.5)
LDLC SERPL CALC-MCNC: 118 MG/DL (ref 0–100)
LYMPHOCYTES # BLD AUTO: 1.94 10*3/MM3 (ref 0.7–3.1)
LYMPHOCYTES NFR BLD AUTO: 26.6 % (ref 19.6–45.3)
Lab: ABNORMAL
MCH RBC QN AUTO: 28.7 PG (ref 26.6–33)
MCHC RBC AUTO-ENTMCNC: 32.3 G/DL (ref 31.5–35.7)
MCV RBC AUTO: 88.9 FL (ref 79–97)
MONOCYTES # BLD AUTO: 0.71 10*3/MM3 (ref 0.1–0.9)
MONOCYTES NFR BLD AUTO: 9.7 % (ref 5–12)
NEUTROPHILS NFR BLD AUTO: 4.39 10*3/MM3 (ref 1.7–7)
NEUTROPHILS NFR BLD AUTO: 60.1 % (ref 42.7–76)
NRBC BLD AUTO-RTO: 0 /100 WBC (ref 0–0.2)
PH UR: 6 [PH] (ref 5–8)
PLATELET # BLD AUTO: 321 10*3/MM3 (ref 140–450)
PMV BLD AUTO: 10.8 FL (ref 6–12)
POTASSIUM SERPL-SCNC: 4.3 MMOL/L (ref 3.5–5.2)
PROT SERPL-MCNC: 6.8 G/DL (ref 6–8.5)
PROT UR STRIP-MCNC: ABNORMAL MG/DL
RBC # BLD AUTO: 4.94 10*6/MM3 (ref 3.77–5.28)
RBC # UR STRIP: ABNORMAL /UL
SODIUM SERPL-SCNC: 140 MMOL/L (ref 136–145)
SP GR UR: 1.01 (ref 1–1.03)
TRIGL SERPL-MCNC: 94 MG/DL (ref 0–150)
TSH SERPL DL<=0.05 MIU/L-ACNC: 2.62 UIU/ML (ref 0.27–4.2)
VLDLC SERPL-MCNC: 17 MG/DL (ref 5–40)
WBC NRBC COR # BLD AUTO: 7.3 10*3/MM3 (ref 3.4–10.8)

## 2024-06-21 PROCEDURE — 3078F DIAST BP <80 MM HG: CPT | Performed by: INTERNAL MEDICINE

## 2024-06-21 PROCEDURE — 83036 HEMOGLOBIN GLYCOSYLATED A1C: CPT | Performed by: INTERNAL MEDICINE

## 2024-06-21 PROCEDURE — 3077F SYST BP >= 140 MM HG: CPT | Performed by: INTERNAL MEDICINE

## 2024-06-21 PROCEDURE — 80061 LIPID PANEL: CPT | Performed by: INTERNAL MEDICINE

## 2024-06-21 PROCEDURE — 36415 COLL VENOUS BLD VENIPUNCTURE: CPT | Performed by: INTERNAL MEDICINE

## 2024-06-21 PROCEDURE — 80053 COMPREHEN METABOLIC PANEL: CPT | Performed by: INTERNAL MEDICINE

## 2024-06-21 PROCEDURE — 81003 URINALYSIS AUTO W/O SCOPE: CPT | Performed by: INTERNAL MEDICINE

## 2024-06-21 PROCEDURE — 1160F RVW MEDS BY RX/DR IN RCRD: CPT | Performed by: INTERNAL MEDICINE

## 2024-06-21 PROCEDURE — 1159F MED LIST DOCD IN RCRD: CPT | Performed by: INTERNAL MEDICINE

## 2024-06-21 PROCEDURE — 85025 COMPLETE CBC W/AUTO DIFF WBC: CPT | Performed by: INTERNAL MEDICINE

## 2024-06-21 PROCEDURE — 1126F AMNT PAIN NOTED NONE PRSNT: CPT | Performed by: INTERNAL MEDICINE

## 2024-06-21 PROCEDURE — 84443 ASSAY THYROID STIM HORMONE: CPT | Performed by: INTERNAL MEDICINE

## 2024-06-21 PROCEDURE — G2211 COMPLEX E/M VISIT ADD ON: HCPCS | Performed by: INTERNAL MEDICINE

## 2024-06-21 PROCEDURE — 99214 OFFICE O/P EST MOD 30 MIN: CPT | Performed by: INTERNAL MEDICINE

## 2024-06-21 RX ORDER — VALSARTAN 160 MG/1
160 TABLET ORAL DAILY
Qty: 90 TABLET | Refills: 1 | Status: SHIPPED | OUTPATIENT
Start: 2024-06-21

## 2024-06-21 NOTE — PROGRESS NOTES
Subjective   Sandi Acevedo is a 73 y.o. female.     Chief Complaint   Patient presents with    Hypertension    Hyperglycemia    Hyperlipidemia         Hypertension  This is a chronic problem. Pertinent negatives include no chest pain, palpitations or shortness of breath.   Hyperglycemia  This is a chronic problem. The current episode started more than 1 year ago. Pertinent negatives include no abdominal pain, chest pain or coughing.   Hyperlipidemia  This is a chronic problem. The current episode started more than 1 year ago. Pertinent negatives include no chest pain or shortness of breath.        The following portions of the patient's history were reviewed and updated as appropriate: allergies, current medications, past social history and problem list.    Outpatient Medications Marked as Taking for the 6/21/24 encounter (Office Visit) with Sheldon Dumont MD   Medication Sig Dispense Refill    amLODIPine (NORVASC) 10 MG tablet Take 1 tablet by mouth Daily. 90 tablet 3    ASPIRIN 81 PO Take 1 tablet by mouth Daily.      B COMPLEX-C ER PO Take 1 tablet by mouth 3 (Three) Times a Week.      calcium carbonate (OS-RCOIO) 600 MG tablet Take 1 tablet by mouth Daily.      cetirizine (zyrTEC) 10 MG tablet Take 1 tablet by mouth Daily As Needed for Allergies.      cholecalciferol (VITAMIN D3) 1000 UNITS tablet Take 1 tablet by mouth Daily.      coenzyme Q10 100 MG capsule Take 1 capsule by mouth Daily.      latanoprost (XALATAN) 0.005 % ophthalmic solution INSTIL 1 DROP IN EYE(S) AT BEDTIME      Magnesium Oxide 400 (240 Mg) MG tablet Take 1 tablet by mouth Daily.      Probiotic Product (PROBIOTIC DAILY PO) Take 1 tablet by mouth Daily.      Red Yeast Rice 600 MG capsule Take 2 capsules by mouth Daily.      Vitamin E 180 MG (400 UNIT) capsule capsule Take 1 capsule by mouth Daily.      Zinc 50 MG capsule Take 25 mg by mouth Daily.         Review of Systems   Respiratory:  Negative for cough, shortness of breath and  wheezing.    Cardiovascular:  Negative for chest pain, palpitations and leg swelling.   Gastrointestinal:  Negative for abdominal pain, constipation and diarrhea.       Objective   Vitals:    06/21/24 0804   BP: 144/66   Pulse: 61   Resp: 16   Temp: 97.8 °F (36.6 °C)   SpO2: 97%      Wt Readings from Last 3 Encounters:   06/21/24 66.7 kg (147 lb)   05/09/24 66 kg (145 lb 6.4 oz)   04/16/24 67.1 kg (148 lb)    Body mass index is 26.88 kg/m².      Physical Exam  Constitutional:       Appearance: Normal appearance. She is well-developed.   Neck:      Thyroid: No thyromegaly.   Cardiovascular:      Rate and Rhythm: Normal rate and regular rhythm.      Heart sounds: Normal heart sounds. No murmur heard.     No gallop.   Pulmonary:      Effort: Pulmonary effort is normal. No respiratory distress.      Breath sounds: Normal breath sounds. No wheezing or rales.   Abdominal:      General: Bowel sounds are normal.      Palpations: Abdomen is soft. There is no mass.      Tenderness: There is no abdominal tenderness. There is no guarding.   Neurological:      Mental Status: She is alert.           Problems Addressed this Visit          Cardiac and Vasculature    Hyperlipidemia (Chronic)    Hypertension, essential - Primary       Endocrine and Metabolic    Impaired glucose tolerance     Diagnoses         Codes Comments    Hypertension, essential    -  Primary ICD-10-CM: I10  ICD-9-CM: 401.9     Hyperlipidemia, unspecified hyperlipidemia type     ICD-10-CM: E78.5  ICD-9-CM: 272.4     Impaired glucose tolerance     ICD-10-CM: R73.02  ICD-9-CM: 790.22           Assessment & Plan   In for 6-month recheck of hypertension, hyperlipidemia and impaired glucose tolerance today June 2024.  Blood pressures are still running high.  Typically she runs about 1 60-1 80 at home.  Diastolics are normal.  She has had a's cough in the past.  Will add some Diovan 160 mg daily today to her amlodipine 10 mg daily.  Those are reviewed today.  Due for  annual lab work today including CBC, CMP, lipids, A1c, TSH.  UA . gets glucose, A1c and lipids every 6 months.  Annual wellness exam will be December 2024.      The above information was reviewed again today 06/21/24.  It continues to be accurate as reflected above and is unchanged.  History, physical and review of systems all reviewed and are unchanged.  Medications were reviewed today and continue the current dosing.             Dragon disclaimer:   Part of this note may be an electronic transcription/translation of spoken language to printed text using the Dragon Dictation System.

## 2024-08-02 ENCOUNTER — OFFICE VISIT (OUTPATIENT)
Dept: INTERNAL MEDICINE | Facility: CLINIC | Age: 74
End: 2024-08-02
Payer: MEDICARE

## 2024-08-02 VITALS
SYSTOLIC BLOOD PRESSURE: 138 MMHG | WEIGHT: 143 LBS | RESPIRATION RATE: 16 BRPM | HEIGHT: 62 IN | TEMPERATURE: 97.9 F | OXYGEN SATURATION: 98 % | BODY MASS INDEX: 26.31 KG/M2 | DIASTOLIC BLOOD PRESSURE: 78 MMHG | HEART RATE: 76 BPM

## 2024-08-02 DIAGNOSIS — I10 HYPERTENSION, ESSENTIAL: ICD-10-CM

## 2024-08-02 DIAGNOSIS — Z78.0 MENOPAUSE: Primary | ICD-10-CM

## 2024-08-02 DIAGNOSIS — M85.80 OSTEOPENIA, UNSPECIFIED LOCATION: Chronic | ICD-10-CM

## 2024-08-02 NOTE — ADDENDUM NOTE
Addended by: BARBARA CAMARGO on: 8/2/2024 11:21 AM     Modules accepted: Level of Service     no evidence of acute ischemia

## 2024-08-02 NOTE — PROGRESS NOTES
Subjective   Sandi Acevedo is a 73 y.o. female.     Chief Complaint   Patient presents with    Hyperlipidemia    Hypertension    Hyperglycemia         Hyperlipidemia  This is a chronic problem. The current episode started more than 1 year ago.   Hypertension  This is a chronic problem.   Hyperglycemia  This is a chronic problem. The current episode started more than 1 year ago. Associated symptoms include fatigue and nausea. Pertinent negatives include no coughing.        The following portions of the patient's history were reviewed and updated as appropriate: allergies, current medications, past social history and problem list.    Outpatient Medications Marked as Taking for the 8/2/24 encounter (Office Visit) with Sheldon Dumont MD   Medication Sig Dispense Refill    amLODIPine (NORVASC) 10 MG tablet Take 1 tablet by mouth Daily. 90 tablet 3    ASPIRIN 81 PO Take 1 tablet by mouth Daily.      B COMPLEX-C ER PO Take 1 tablet by mouth 3 (Three) Times a Week.      calcium carbonate (OS-ROCIO) 600 MG tablet Take 1 tablet by mouth Daily.      cetirizine (zyrTEC) 10 MG tablet Take 1 tablet by mouth Daily As Needed for Allergies.      cholecalciferol (VITAMIN D3) 1000 UNITS tablet Take 1 tablet by mouth Daily.      coenzyme Q10 100 MG capsule Take 1 capsule by mouth Daily.      latanoprost (XALATAN) 0.005 % ophthalmic solution INSTIL 1 DROP IN EYE(S) AT BEDTIME      Magnesium Oxide 400 (240 Mg) MG tablet Take 1 tablet by mouth Daily.      Probiotic Product (PROBIOTIC DAILY PO) Take 1 tablet by mouth Daily.      Red Yeast Rice 600 MG capsule Take 2 capsules by mouth Daily.      valsartan (Diovan) 160 MG tablet Take 1 tablet by mouth Daily. 90 tablet 1    Vitamin E 180 MG (400 UNIT) capsule capsule Take 1 capsule by mouth Daily.      Zinc 50 MG capsule Take 25 mg by mouth Daily.         Review of Systems   Constitutional:  Positive for fatigue.   Respiratory:  Negative for cough.    Gastrointestinal:  Positive for  nausea.       Objective   Vitals:    08/02/24 0813   BP: 138/78   Pulse: 76   Resp: 16   Temp: 97.9 °F (36.6 °C)   SpO2: 98%      Wt Readings from Last 3 Encounters:   08/02/24 64.9 kg (143 lb)   06/21/24 66.7 kg (147 lb)   05/09/24 66 kg (145 lb 6.4 oz)    Body mass index is 26.16 kg/m².      Physical Exam  Constitutional:       Appearance: Normal appearance. She is well-developed.   Neck:      Thyroid: No thyromegaly.   Cardiovascular:      Rate and Rhythm: Normal rate and regular rhythm.      Heart sounds: Normal heart sounds. No murmur heard.     No gallop.   Pulmonary:      Effort: Pulmonary effort is normal. No respiratory distress.      Breath sounds: Normal breath sounds. No wheezing or rales.   Neurological:      Mental Status: She is alert.           Problems Addressed this Visit          Cardiac and Vasculature    Hypertension, essential       Musculoskeletal and Injuries    Osteopenia (Chronic)     Other Visit Diagnoses       Menopause    -  Primary    Relevant Orders    DEXA Bone Density Axial          Diagnoses         Codes Comments    Menopause    -  Primary ICD-10-CM: Z78.0  ICD-9-CM: 627.2     Hypertension, essential     ICD-10-CM: I10  ICD-9-CM: 401.9     Osteopenia, unspecified location     ICD-10-CM: M85.80  ICD-9-CM: 733.90           Assessment & Plan   In for 1 month recheck of hypertension.  She was running high in June 2024.  We added Diovan 160 mg to her amlodipine 1 month ago.  She has had ACE induced coughs in the past but is done fine with the Diovan.  She has had some headache and nausea and fatigue.  Blood pressure is much improved now.  No changes made.  Those symptoms are now improving.  Annual wellness exam will be December 2024.  Annual lab work was done June 2024.  She will have a glucose, A1c and lipids in December 2024.  Will schedule DEXA scan regarding her osteopenia.    The above information was reviewed again today 08/02/24.  It continues to be accurate as reflected above  and is unchanged.  History, physical and review of systems all reviewed and are unchanged.  Medications were reviewed today and continue the current dosing.               Dragon disclaimer:   Part of this note may be an electronic transcription/translation of spoken language to printed text using the Dragon Dictation System.

## 2024-10-07 ENCOUNTER — TELEPHONE (OUTPATIENT)
Dept: INTERNAL MEDICINE | Facility: CLINIC | Age: 74
End: 2024-10-07
Payer: MEDICARE

## 2024-10-07 NOTE — TELEPHONE ENCOUNTER
HUB TO READ    LM to inform Dr. Dumont is out of office 1/2 day, out Thursday and Friday, to go to [facility] for treatment, will call with any cancellations

## 2024-10-07 NOTE — TELEPHONE ENCOUNTER
Caller: Sandi Acevedo    Relationship: Self    Best call back number:    [phone] (Home)       What orders are you requesting (i.e. lab or imaging): URINALYSIS     In what timeframe would the patient need to come in: TODAY    Where will you receive your lab/imaging services:     Additional notes: PATIENT CALLED TO REQUEST A URINALYSIS TO CHECK FOR A UTI AND CANCER.     PLEASE CONTACT PATIENT TO ADVISE.         THANKS

## 2024-10-15 ENCOUNTER — OFFICE VISIT (OUTPATIENT)
Dept: INTERNAL MEDICINE | Facility: CLINIC | Age: 74
End: 2024-10-15
Payer: MEDICARE

## 2024-10-15 ENCOUNTER — LAB (OUTPATIENT)
Dept: LAB | Facility: HOSPITAL | Age: 74
End: 2024-10-15
Payer: MEDICARE

## 2024-10-15 VITALS
WEIGHT: 153 LBS | HEART RATE: 81 BPM | DIASTOLIC BLOOD PRESSURE: 60 MMHG | SYSTOLIC BLOOD PRESSURE: 120 MMHG | HEIGHT: 62 IN | TEMPERATURE: 97.5 F | BODY MASS INDEX: 28.16 KG/M2

## 2024-10-15 DIAGNOSIS — R31.0 GROSS HEMATURIA: ICD-10-CM

## 2024-10-15 DIAGNOSIS — N30.01 ACUTE CYSTITIS WITH HEMATURIA: Primary | ICD-10-CM

## 2024-10-15 PROCEDURE — 1126F AMNT PAIN NOTED NONE PRSNT: CPT | Performed by: INTERNAL MEDICINE

## 2024-10-15 PROCEDURE — 88305 TISSUE EXAM BY PATHOLOGIST: CPT

## 2024-10-15 PROCEDURE — 1160F RVW MEDS BY RX/DR IN RCRD: CPT | Performed by: INTERNAL MEDICINE

## 2024-10-15 PROCEDURE — 99213 OFFICE O/P EST LOW 20 MIN: CPT | Performed by: INTERNAL MEDICINE

## 2024-10-15 PROCEDURE — 88112 CYTOPATH CELL ENHANCE TECH: CPT

## 2024-10-15 PROCEDURE — 3078F DIAST BP <80 MM HG: CPT | Performed by: INTERNAL MEDICINE

## 2024-10-15 PROCEDURE — 1159F MED LIST DOCD IN RCRD: CPT | Performed by: INTERNAL MEDICINE

## 2024-10-15 PROCEDURE — 3074F SYST BP LT 130 MM HG: CPT | Performed by: INTERNAL MEDICINE

## 2024-10-15 NOTE — PROGRESS NOTES
Subjective   Sandi Acevedo is a 74 y.o. female.     Chief Complaint   Patient presents with    Urinary Tract Infection         History of Present Illness  Seen at [facility] on 10/7/2024 with concerns about some blood on the toilet tissue when she wipes after voiding.  Just a spot of blood at times.  Not every time.  No blood in the urine itself or in the bowl.  She does mention some low backache on and off for 1 month.  Perhaps she has noticed this spot of blood from time to time over the past month.  She is mainly worried about cancer.  The urinalysis was completely clear other than some mild leukocytes and she was treated with 5 days of Bactrim DS twice daily.  That actually seemed to clear up her back pain which seems to be right SI in origin.  Urinary Tract Infection   This is a new problem. The current episode started in the past 7 days. The problem occurs intermittently. The problem has been unchanged. Pertinent negatives include no chills, flank pain, frequency, hematuria or urgency.        The following portions of the patient's history were reviewed and updated as appropriate: allergies, current medications, past social history and problem list.    Outpatient Medications Marked as Taking for the 10/15/24 encounter (Office Visit) with Sheldon Dumont MD   Medication Sig Dispense Refill    amLODIPine (NORVASC) 10 MG tablet Take 1 tablet by mouth Daily. 90 tablet 3    ASPIRIN 81 PO Take 1 tablet by mouth Daily.      B COMPLEX-C ER PO Take 1 tablet by mouth 3 (Three) Times a Week.      calcium carbonate (OS-ROCIO) 600 MG tablet Take 1 tablet by mouth Daily.      cetirizine (zyrTEC) 10 MG tablet Take 1 tablet by mouth Daily As Needed for Allergies.      cholecalciferol (VITAMIN D3) 1000 UNITS tablet Take 1 tablet by mouth Daily.      coenzyme Q10 100 MG capsule Take 1 capsule by mouth Daily.      latanoprost (XALATAN) 0.005 % ophthalmic solution INSTIL 1 DROP IN EYE(S) AT BEDTIME       Magnesium Oxide 400 (240 Mg) MG tablet Take 1 tablet by mouth Daily.      Probiotic Product (PROBIOTIC DAILY PO) Take 1 tablet by mouth Daily.      Red Yeast Rice 600 MG capsule Take 2 capsules by mouth Daily.      valsartan (Diovan) 160 MG tablet Take 1 tablet by mouth Daily. 90 tablet 1    Vitamin E 180 MG (400 UNIT) capsule capsule Take 1 capsule by mouth Daily.      Zinc 50 MG capsule Take 25 mg by mouth Daily.         Review of Systems   Constitutional:  Negative for chills and fever.   Genitourinary:  Negative for dysuria, flank pain, frequency, hematuria and urgency.   Musculoskeletal:  Negative for back pain.       Objective   Vitals:    10/15/24 1306   BP: 120/60   Pulse: 81   Temp: 97.5 °F (36.4 °C)      Wt Readings from Last 3 Encounters:   10/15/24 69.4 kg (153 lb)   08/02/24 64.9 kg (143 lb)   06/21/24 66.7 kg (147 lb)    Body mass index is 27.98 kg/m².      Physical Exam  Constitutional:       Appearance: Normal appearance. She is well-developed.   Pulmonary:      Effort: Pulmonary effort is normal.      Breath sounds: Normal breath sounds.   Abdominal:      General: Bowel sounds are normal. There is no distension.      Palpations: Abdomen is soft.      Tenderness: There is no abdominal tenderness. There is no guarding.   Neurological:      Mental Status: She is alert.           Problems Addressed this Visit    None  Visit Diagnoses       Acute cystitis with hematuria    -  Primary          Diagnoses         Codes Comments    Acute cystitis with hematuria    -  Primary ICD-10-CM: N30.01  ICD-9-CM: 595.0           Assessment & Plan   In with concerns about bladder cancer.  She gets an occasional drop of blood on the toilet tissue when she wipes postvoiding.  No actual blood in the commode.  She has had no frequency or dysuria.  She has had some low back pain on and off but that sounds more musculoskeletal than renal related.  She was given 5 days of Bactrim DS 1 week ago without much improvement other  than her back pain is cleared up.  That may well be serendipity.  She requests additional cancer evaluation today.  Will get a urine for cytology.  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  It is also noted that she is a lifelong non-smoker.    The above information was reviewed again today 10/15/24.  It continues to be accurate as reflected above and is unchanged.  History, physical and review of systems all reviewed and are unchanged.  Medications were reviewed today and continue the current dosing.               Dragon disclaimer:   Part of this note may be an electronic transcription/translation of spoken language to printed text using the Dragon Dictation System.

## 2024-10-17 LAB
CYTO UR: NORMAL
LAB AP CASE REPORT: NORMAL
LAB AP NON-GYN INTERPRETATION: NORMAL
PATH REPORT.FINAL DX SPEC: NORMAL
PATH REPORT.GROSS SPEC: NORMAL

## 2024-12-06 ENCOUNTER — OFFICE VISIT (OUTPATIENT)
Dept: CARDIOLOGY | Facility: CLINIC | Age: 74
End: 2024-12-06
Payer: MEDICARE

## 2024-12-06 VITALS
WEIGHT: 147 LBS | DIASTOLIC BLOOD PRESSURE: 84 MMHG | HEART RATE: 78 BPM | HEIGHT: 62 IN | SYSTOLIC BLOOD PRESSURE: 156 MMHG | BODY MASS INDEX: 27.05 KG/M2

## 2024-12-06 DIAGNOSIS — I10 PRIMARY HYPERTENSION: Primary | ICD-10-CM

## 2024-12-06 PROCEDURE — 99214 OFFICE O/P EST MOD 30 MIN: CPT | Performed by: NURSE PRACTITIONER

## 2024-12-06 PROCEDURE — 3077F SYST BP >= 140 MM HG: CPT | Performed by: NURSE PRACTITIONER

## 2024-12-06 PROCEDURE — 93000 ELECTROCARDIOGRAM COMPLETE: CPT | Performed by: NURSE PRACTITIONER

## 2024-12-06 PROCEDURE — 1159F MED LIST DOCD IN RCRD: CPT | Performed by: NURSE PRACTITIONER

## 2024-12-06 PROCEDURE — 3079F DIAST BP 80-89 MM HG: CPT | Performed by: NURSE PRACTITIONER

## 2024-12-06 PROCEDURE — 1160F RVW MEDS BY RX/DR IN RCRD: CPT | Performed by: NURSE PRACTITIONER

## 2024-12-06 RX ORDER — VALSARTAN 160 MG/1
240 TABLET ORAL DAILY
Start: 2024-12-06

## 2024-12-06 NOTE — LETTER
2024     Desean Sanders MD  [address]  [address]  [facility] [address]    Patient: Sandi Acevedo   YOB: 1950   Date of Visit: 2024     Dear Desean Sanders MD:       Thank you for referring Sandi Acevedo to me for evaluation. Below are the relevant portions of my assessment and plan of care.    If you have questions, please do not hesitate to call me. I look forward to following Sandi along with you.         Sincerely,        DARREN Sagastume        CC: No Recipients    Brenda Avina APRN  24 0926  Sign when Signing Visit  Date of Office Visit: 24  Encounter Provider: DARREN Sagastume  Place of Service: [facility] CARDIOLOGY  Patient Name: Sandi Acevedo  :1950    Chief Complaint   Patient presents with   • Stenosis of carotid artery, unspecified laterality   • Follow-up   :     HPI: Sandi Acevedo is a 74 y.o. female  with hypertension, carotid artery stenosis, and diastolic dysfunction.    She is a former patient of Dr. Gary Coughlin.    She is followed by Dr. Cristopher Kirk.  I will visit with her for the first time and have reviewed her medical record.    She had echocardiogram in 2016 which showed normal left ventricular systolic function, grade 1 diastolic dysfunction and no significant valve disease.  Perfusion stress test at that time was overall low risk showing no evidence of ischemia and normal ECG stress test.      She presents today for reassessment.  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  She reportedly walked the 6 flights of steps to our office.  She took a short break on the third floor.  She denies palpitation, dizziness or edema.  She denies chest pain tightness or pressure.  She does not add salt to her food.  She checks her blood pressure routinely at home and states she consistently has values 1 50-1 60 over 70s.  She takes both amlodipine and  "valsartan at night.  Allergies   Allergen Reactions   • Ace Inhibitors Cough   • Benazepril Cough           Family and social history reviewed.     ROS  All other systems were reviewed and are negative          Objective:     Vitals:    12/06/24 0841 12/06/24 0905   BP: 170/80 156/84   BP Location: Left arm    Patient Position: Sitting    Cuff Size: Adult    Pulse: 78    Weight: 66.7 kg (147 lb)    Height: 157.5 cm (62\")      Body mass index is 26.89 kg/m².    PHYSICAL EXAM:  Pulmonary:      Effort: Pulmonary effort is normal.      Breath sounds: Normal breath sounds.   Cardiovascular:      Normal rate. Regular rhythm.           ECG 12 Lead    Date/Time: 12/6/2024 8:57 AM  Performed by: Brenda Avina APRN    Authorized by: Brenda Avina APRN  Comparison: compared with previous ECG   Similar to previous ECG  Rhythm: sinus rhythm  Rate: normal  Q waves: III and aVF    QRS axis: normal  Comments: No significant change            Current Outpatient Medications   Medication Sig Dispense Refill   • amLODIPine (NORVASC) 10 MG tablet Take 1 tablet by mouth Daily. 90 tablet 3   • ASPIRIN 81 PO Take 1 tablet by mouth Daily.     • B COMPLEX-C ER PO Take 1 tablet by mouth 3 (Three) Times a Week.     • calcium carbonate (OS-ROCIO) 600 MG tablet Take 1 tablet by mouth Daily.     • cetirizine (zyrTEC) 10 MG tablet Take 1 tablet by mouth Daily As Needed for Allergies.     • cholecalciferol (VITAMIN D3) 1000 UNITS tablet Take 1 tablet by mouth Daily.     • coenzyme Q10 100 MG capsule Take 1 capsule by mouth Daily.     • latanoprost (XALATAN) 0.005 % ophthalmic solution INSTIL 1 DROP IN EYE(S) AT BEDTIME     • Magnesium Oxide 400 (240 Mg) MG tablet Take 1 tablet by mouth Daily.     • Probiotic Product (PROBIOTIC DAILY PO) Take 1 tablet by mouth Daily.     • Red Yeast Rice 600 MG capsule Take 2 capsules by mouth Daily.     • valsartan (Diovan) 160 MG tablet Take 1.5 tablets by mouth Daily.     • Vitamin E 180 MG (400 UNIT) capsule " capsule Take 1 capsule by mouth Daily.     • Zinc 50 MG capsule Take 25 mg by mouth Daily.       No current facility-administered medications for this visit.     Assessment:       Diagnosis Plan   1. Primary hypertension  ECG 12 Lead           Orders Placed This Encounter   Procedures   • ECG 12 Lead     This order was created via procedure documentation     Order Specific Question:   Release to patient     Answer:   Routine Release [[id number]]         Plan:     1.  74-year-old female with uncontrolled hypertension.  She will continue amlodipine 10 mg nightly.  She will increase valsartan from 160 mg to 240 mg at night.  She will take 1.5 tablet nightly to achieve this.  She will perform home blood pressure monitoring and start monitoring food labels for sodium content to decrease sodium in her diet.  She is scheduled for lab work with her PCP in early January and prefers to have kidney function checked at that time.  I will request her blood pressure readings through [employer] in 2 weeks  2.  History of ACE inhibitor cough  3.Hyperlipidemia-she is on rare yeast rice  and coQ enzyme 10 but not on statin therapy.  She reportedly has never tried statin and would prefer not to due to concern for potential side effect.  Reviewed her lipid panel from June of this year showing .  4.Carotid artery plaque bilateral on duplex January 2023 with no significant stenosis.  Continue aspirin  5.Prediabetes-last A1c 6.10 in June  6.  Grade 1 diastolic dysfunction-plan as listed in #1 to further improve blood pressure readings  7.  Need for hysterectomy.  She will be seeing Dr. Desean Sanders.  Her EKG today shows no significant change.  She exercises routinely and water aerobics with no exertional complaint.  She is at acceptable cardiovascular risk to have hysterectomy.  She may hold aspirin for 1 week prior and resume as soon as possible postprocedure.    Follow-up in 6 months.          It has been a pleasure to participate  in this patient's care.      Thank you,  DARREN Sagastume      **I used Dragon to dictate this note:**

## 2024-12-06 NOTE — PROGRESS NOTES
"Date of Office Visit: 24  Encounter Provider: DARREN Sagastume  Place of Service: [facility] CARDIOLOGY  Patient Name: Sandi Acevedo  :1950    Chief Complaint   Patient presents with    Stenosis of carotid artery, unspecified laterality    Follow-up   :     HPI: Sandi Acevedo is a 74 y.o. female  with hypertension, carotid artery stenosis, and diastolic dysfunction.    She is a former patient of Dr. Gary Coughlin.    She is followed by Dr. Cristopher Kirk.  I will visit with her for the first time and have reviewed her medical record.    She had echocardiogram in 2016 which showed normal left ventricular systolic function, grade 1 diastolic dysfunction and no significant valve disease.  Perfusion stress test at that time was overall low risk showing no evidence of ischemia and normal ECG stress test.      She presents today for reassessment.  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  She reportedly walked the 6 flights of steps to our office.  She took a short break on the third floor.  She denies palpitation, dizziness or edema.  She denies chest pain tightness or pressure.  She does not add salt to her food.  She checks her blood pressure routinely at home and states she consistently has values 1 50-1 60 over 70s.  She takes both amlodipine and valsartan at night.  Allergies   Allergen Reactions    Ace Inhibitors Cough    Benazepril Cough           Family and social history reviewed.     ROS  All other systems were reviewed and are negative          Objective:     Vitals:    24 0841 24 0905   BP: 170/80 156/84   BP Location: Left arm    Patient Position: Sitting    Cuff Size: Adult    Pulse: 78    Weight: 66.7 kg (147 lb)    Height: 157.5 cm (62\")      Body mass index is 26.89 kg/m².    PHYSICAL EXAM:  Pulmonary:      Effort: Pulmonary effort is normal.      Breath sounds: Normal breath sounds. "   Cardiovascular:      Normal rate. Regular rhythm.           ECG 12 Lead    Date/Time: 12/6/2024 8:57 AM  Performed by: Brenda Avina APRN    Authorized by: Brenda Avina APRN  Comparison: compared with previous ECG   Similar to previous ECG  Rhythm: sinus rhythm  Rate: normal  Q waves: III and aVF    QRS axis: normal  Comments: No significant change            Current Outpatient Medications   Medication Sig Dispense Refill    amLODIPine (NORVASC) 10 MG tablet Take 1 tablet by mouth Daily. 90 tablet 3    ASPIRIN 81 PO Take 1 tablet by mouth Daily.      B COMPLEX-C ER PO Take 1 tablet by mouth 3 (Three) Times a Week.      calcium carbonate (OS-ROCIO) 600 MG tablet Take 1 tablet by mouth Daily.      cetirizine (zyrTEC) 10 MG tablet Take 1 tablet by mouth Daily As Needed for Allergies.      cholecalciferol (VITAMIN D3) 1000 UNITS tablet Take 1 tablet by mouth Daily.      coenzyme Q10 100 MG capsule Take 1 capsule by mouth Daily.      latanoprost (XALATAN) 0.005 % ophthalmic solution INSTIL 1 DROP IN EYE(S) AT BEDTIME      Magnesium Oxide 400 (240 Mg) MG tablet Take 1 tablet by mouth Daily.      Probiotic Product (PROBIOTIC DAILY PO) Take 1 tablet by mouth Daily.      Red Yeast Rice 600 MG capsule Take 2 capsules by mouth Daily.      valsartan (Diovan) 160 MG tablet Take 1.5 tablets by mouth Daily.      Vitamin E 180 MG (400 UNIT) capsule capsule Take 1 capsule by mouth Daily.      Zinc 50 MG capsule Take 25 mg by mouth Daily.       No current facility-administered medications for this visit.     Assessment:       Diagnosis Plan   1. Primary hypertension  ECG 12 Lead           Orders Placed This Encounter   Procedures    ECG 12 Lead     This order was created via procedure documentation     Order Specific Question:   Release to patient     Answer:   Routine Release [[id number]]         Plan:     1.  74-year-old female with uncontrolled hypertension.  She will continue amlodipine 10 mg nightly.  She will increase  valsartan from 160 mg to 240 mg at night.  She will take 1.5 tablet nightly to achieve this.  She will perform home blood pressure monitoring and start monitoring food labels for sodium content to decrease sodium in her diet.  She is scheduled for lab work with her PCP in early January and prefers to have kidney function checked at that time.  I will request her blood pressure readings through [facility] in 2 weeks  2.  History of ACE inhibitor cough  3.Hyperlipidemia-she is on rare yeast rice  and coQ enzyme 10 but not on statin therapy.  She reportedly has never tried statin and would prefer not to due to concern for potential side effect.  Reviewed her lipid panel from June of this year showing .  4.Carotid artery plaque bilateral on duplex January 2023 with no significant stenosis.  Continue aspirin  5.Prediabetes-last A1c 6.10 in June  6.  Grade 1 diastolic dysfunction-plan as listed in #1 to further improve blood pressure readings  7.  Need for hysterectomy.  She will be seeing Dr. Desean Sanders.  Her EKG today shows no significant change.  She exercises routinely and water aerobics with no exertional complaint.  She is at acceptable cardiovascular risk to have hysterectomy.  She may hold aspirin for 1 week prior and resume as soon as possible postprocedure.    Follow-up in 6 months.          It has been a pleasure to participate in this patient's care.      Thank you,  DARREN Sagastume      **I used Dragon to dictate this note:**

## 2025-01-03 ENCOUNTER — OFFICE VISIT (OUTPATIENT)
Dept: INTERNAL MEDICINE | Facility: CLINIC | Age: 75
End: 2025-01-03
Payer: MEDICARE

## 2025-01-03 ENCOUNTER — LAB (OUTPATIENT)
Dept: LAB | Facility: HOSPITAL | Age: 75
End: 2025-01-03
Payer: MEDICARE

## 2025-01-03 VITALS
TEMPERATURE: 97.5 F | RESPIRATION RATE: 18 BRPM | DIASTOLIC BLOOD PRESSURE: 80 MMHG | HEART RATE: 72 BPM | OXYGEN SATURATION: 98 % | BODY MASS INDEX: 26.68 KG/M2 | HEIGHT: 62 IN | SYSTOLIC BLOOD PRESSURE: 122 MMHG | WEIGHT: 145 LBS

## 2025-01-03 DIAGNOSIS — E78.5 HYPERLIPIDEMIA, UNSPECIFIED HYPERLIPIDEMIA TYPE: Chronic | ICD-10-CM

## 2025-01-03 DIAGNOSIS — R73.02 IMPAIRED GLUCOSE TOLERANCE: ICD-10-CM

## 2025-01-03 DIAGNOSIS — Z79.899 MEDICATION MANAGEMENT: ICD-10-CM

## 2025-01-03 DIAGNOSIS — Z00.00 ENCOUNTER FOR ANNUAL WELLNESS EXAM IN MEDICARE PATIENT: ICD-10-CM

## 2025-01-03 DIAGNOSIS — I10 HYPERTENSION, ESSENTIAL: Primary | ICD-10-CM

## 2025-01-03 LAB
ANION GAP SERPL CALCULATED.3IONS-SCNC: 8 MMOL/L (ref 5–15)
BUN SERPL-MCNC: 14 MG/DL (ref 8–23)
BUN/CREAT SERPL: 17.1 (ref 7–25)
CALCIUM SPEC-SCNC: 9.1 MG/DL (ref 8.6–10.5)
CHLORIDE SERPL-SCNC: 105 MMOL/L (ref 98–107)
CHOLEST SERPL-MCNC: 161 MG/DL (ref 0–200)
CO2 SERPL-SCNC: 25 MMOL/L (ref 22–29)
CREAT SERPL-MCNC: 0.82 MG/DL (ref 0.57–1)
EGFRCR SERPLBLD CKD-EPI 2021: 74.2 ML/MIN/1.73
GLUCOSE SERPL-MCNC: 96 MG/DL (ref 65–99)
HBA1C MFR BLD: 6 % (ref 4.8–5.6)
HDLC SERPL QL: 3.35
HDLC SERPL-MCNC: 48 MG/DL (ref 40–60)
LDLC SERPL CALC-MCNC: 97 MG/DL (ref 0–100)
POTASSIUM SERPL-SCNC: 4.2 MMOL/L (ref 3.5–5.2)
SODIUM SERPL-SCNC: 138 MMOL/L (ref 136–145)
TRIGL SERPL-MCNC: 82 MG/DL (ref 0–150)
VLDLC SERPL-MCNC: 16 MG/DL (ref 5–40)

## 2025-01-03 PROCEDURE — 99214 OFFICE O/P EST MOD 30 MIN: CPT | Performed by: INTERNAL MEDICINE

## 2025-01-03 PROCEDURE — 3079F DIAST BP 80-89 MM HG: CPT | Performed by: INTERNAL MEDICINE

## 2025-01-03 PROCEDURE — 3074F SYST BP LT 130 MM HG: CPT | Performed by: INTERNAL MEDICINE

## 2025-01-03 PROCEDURE — 83036 HEMOGLOBIN GLYCOSYLATED A1C: CPT | Performed by: INTERNAL MEDICINE

## 2025-01-03 PROCEDURE — 1126F AMNT PAIN NOTED NONE PRSNT: CPT | Performed by: INTERNAL MEDICINE

## 2025-01-03 PROCEDURE — 1170F FXNL STATUS ASSESSED: CPT | Performed by: INTERNAL MEDICINE

## 2025-01-03 PROCEDURE — 1159F MED LIST DOCD IN RCRD: CPT | Performed by: INTERNAL MEDICINE

## 2025-01-03 PROCEDURE — 36415 COLL VENOUS BLD VENIPUNCTURE: CPT | Performed by: INTERNAL MEDICINE

## 2025-01-03 PROCEDURE — 1160F RVW MEDS BY RX/DR IN RCRD: CPT | Performed by: INTERNAL MEDICINE

## 2025-01-03 PROCEDURE — 80061 LIPID PANEL: CPT | Performed by: INTERNAL MEDICINE

## 2025-01-03 PROCEDURE — G0439 PPPS, SUBSEQ VISIT: HCPCS | Performed by: INTERNAL MEDICINE

## 2025-01-03 PROCEDURE — 80048 BASIC METABOLIC PNL TOTAL CA: CPT | Performed by: INTERNAL MEDICINE

## 2025-01-03 RX ORDER — VALSARTAN 320 MG/1
320 TABLET ORAL DAILY
Start: 2025-01-03

## 2025-01-03 NOTE — PROGRESS NOTES
Subjective   The ABCs of the Annual Wellness Visit  Medicare Wellness Visit      Sandi Acevedo is a 74 y.o. patient who presents for a Medicare Wellness Visit.    The following portions of the patient's history were reviewed and   updated as appropriate: allergies, current medications, past family history, past medical history, past social history, past surgical history, and problem list.    Compared to one year ago, the patient's physical   health is the same.  Compared to one year ago, the patient's mental   health is the same.    Recent Hospitalizations:  She was not admitted to the hospital during the last year.     Current Medical Providers:  Patient Care Team:  Sheldon Dumont MD as PCP - General (Internal Medicine)  Eddie Sarah MD as Consulting Physician (Ophthalmology)  Jordon Rivera DPM as Consulting Physician (Podiatry)  Topher Millard MD as Consulting Physician (Vascular Surgery)    Outpatient Medications Prior to Visit   Medication Sig Dispense Refill    amLODIPine (NORVASC) 10 MG tablet Take 1 tablet by mouth Daily. 90 tablet 3    ASPIRIN 81 PO Take 1 tablet by mouth Daily.      B COMPLEX-C ER PO Take 1 tablet by mouth 3 (Three) Times a Week.      calcium carbonate (OS-ROCIO) 600 MG tablet Take 1 tablet by mouth Daily.      cetirizine (zyrTEC) 10 MG tablet Take 1 tablet by mouth Daily As Needed for Allergies.      cholecalciferol (VITAMIN D3) 1000 UNITS tablet Take 1 tablet by mouth Daily.      coenzyme Q10 100 MG capsule Take 1 capsule by mouth Daily.      latanoprost (XALATAN) 0.005 % ophthalmic solution INSTIL 1 DROP IN EYE(S) AT BEDTIME      Magnesium Oxide 400 (240 Mg) MG tablet Take 1 tablet by mouth Daily.      Probiotic Product (PROBIOTIC DAILY PO) Take 1 tablet by mouth Daily.      Red Yeast Rice 600 MG capsule Take 2 capsules by mouth Daily.      Vitamin E 180 MG (400 UNIT) capsule capsule Take 1 capsule by mouth Daily.      Zinc 50 MG capsule Take 25 mg by mouth Daily.       "valsartan (Diovan) 160 MG tablet Take 1.5 tablets by mouth Daily.       No facility-administered medications prior to visit.     No opioid medication identified on active medication list. I have reviewed chart for other potential  high risk medication/s and harmful drug interactions in the elderly.      Aspirin is on active medication list. Aspirin use is indicated based on review of current medical condition/s. Pros and cons of this therapy have been discussed today. Benefits of this medication outweigh potential harm.  Patient has been encouraged to continue taking this medication.  .      Patient Active Problem List   Diagnosis    Hypertension, essential    Glaucoma associated with ocular disorder (Conrd)    Agoraphobia    Urinary incontinence in female    Osteopenia    FH: uterine cancer    FH: CAD (coronary artery disease)    Vitamin D deficiency disease    Panic anxiety syndrome (Xanax)    Osteoarthritis    Hyperlipidemia    Sciatica of left side    Impaired glucose tolerance    Diastolic dysfunction    Abnormal EKG    Abnormal echocardiogram     Advance Care Planning Advance Directive is not on file.  ACP discussion was held with the patient during this visit. Patient has an advance directive (not in EMR), copy requested.            Objective   Vitals:    01/03/25 0844   BP: 122/80   Pulse: 72   Resp: 18   Temp: 97.5 °F (36.4 °C)   TempSrc: Temporal   SpO2: 98%   Weight: 65.8 kg (145 lb)   Height: 157.5 cm (62\")   PainSc: 0-No pain       Estimated body mass index is 26.52 kg/m² as calculated from the following:    Height as of this encounter: 157.5 cm (62\").    Weight as of this encounter: 65.8 kg (145 lb).                Does the patient have evidence of cognitive impairment? No                                                                                               Health  Risk Assessment    Smoking Status:  Social History     Tobacco Use   Smoking Status Never    Passive exposure: Never   Smokeless " Tobacco Never     Alcohol Consumption:  Social History     Substance and Sexual Activity   Alcohol Use No       Fall Risk Screen  STEADI Fall Risk Assessment was completed, and patient is at LOW risk for falls.Assessment completed on:1/3/2025    Depression Screening   Little interest or pleasure in doing things? Not at all   Feeling down, depressed, or hopeless? Not at all   PHQ-2 Total Score 0      Health Habits and Functional and Cognitive Screenin/3/2025     8:46 AM   Functional & Cognitive Status   Do you have difficulty preparing food and eating? No   Do you have difficulty bathing yourself, getting dressed or grooming yourself? No   Do you have difficulty using the toilet? No   Current Diet Well Balanced Diet   Dental Exam Up to date   Eye Exam Up to date   Exercise (times per week) 4 times per week   Current Exercises Include Aerobics   Do you need help using the phone?  No   Are you deaf or do you have serious difficulty hearing?  No   Do you need help to go to places out of walking distance? No   Do you need help shopping? No   Do you need help preparing meals?  No   Do you need help with housework?  No   Do you need help with laundry? No   Do you need help taking your medications? No   Do you need help managing money? No   Do you ever drive or ride in a car without wearing a seat belt? No   Have you felt unusual stress, anger or loneliness in the last month? No   Who do you live with? Alone   If you need help, do you have trouble finding someone available to you? No   Have you been bothered in the last four weeks by sexual problems? No   Do you have difficulty concentrating, remembering or making decisions? No           Age-appropriate Screening Schedule:  Refer to the list below for future screening recommendations based on patient's age, sex and/or medical conditions. Orders for these recommended tests are listed in the plan section. The patient has been provided with a written plan.    Health  Maintenance List  Health Maintenance   Topic Date Due    INFLUENZA VACCINE  07/01/2024    COVID-19 Vaccine (3 - 2024-25 season) 09/01/2024    ANNUAL WELLNESS VISIT  12/15/2024    DXA SCAN  12/30/2024    RSV Vaccine - Adults (1 - 1-dose 75+ series) 01/01/2050 (Originally 10/2/2023)    TDAP/TD VACCINES (2 - Td or Tdap) 01/01/2050 (Originally 6/18/2018)    ZOSTER VACCINE (2 of 2) 01/01/2050 (Originally 6/23/2017)    LIPID PANEL  06/21/2025    BMI FOLLOWUP  06/21/2025    COLORECTAL CANCER SCREENING  11/06/2029    HEPATITIS C SCREENING  Completed    Pneumococcal Vaccine 65+  Completed    MAMMOGRAM  Discontinued                                                                                                                                                CMS Preventative Services Quick Reference  Risk Factors Identified During Encounter  Fall Risk-High or Moderate: Information on Fall Prevention Shared in After Visit Summary  Normal Balance     The above risks/problems have been discussed with the patient.  Pertinent information has been shared with the patient in the After Visit Summary.  An After Visit Summary and PPPS were made available to the patient.    Follow Up:   Next Medicare Wellness visit to be scheduled in 1 year.     Assessment & Plan  Hypertension, essential           Hyperlipidemia, unspecified hyperlipidemia type            Impaired glucose tolerance         Encounter for annual wellness exam in Medicare patient              Follow Up:   No follow-ups on file.

## 2025-01-03 NOTE — PROGRESS NOTES
Subjective   Sandi Acevedo is a 74 y.o. female.     Chief Complaint   Patient presents with    Medicare Wellness-subsequent    Hyperlipidemia    Coronary Artery Disease    Hypertension         Hyperlipidemia  This is a chronic problem. The current episode started more than 1 year ago. Pertinent negatives include no chest pain or shortness of breath.   Coronary Artery Disease  Presents for follow-up visit. Pertinent negatives include no chest pain, leg swelling, palpitations or shortness of breath. Risk factors include hyperlipidemia.   Hypertension  This is a chronic problem. Pertinent negatives include no chest pain, headaches, palpitations or shortness of breath.   Hyperglycemia  This is a chronic problem. The current episode started more than 1 year ago. Pertinent negatives include no chest pain, coughing or headaches.        The following portions of the patient's history were reviewed and updated as appropriate: allergies, current medications, past social history and problem list.    Outpatient Medications Marked as Taking for the 1/3/25 encounter (Office Visit) with Sheldon Dumont MD   Medication Sig Dispense Refill    amLODIPine (NORVASC) 10 MG tablet Take 1 tablet by mouth Daily. 90 tablet 3    ASPIRIN 81 PO Take 1 tablet by mouth Daily.      B COMPLEX-C ER PO Take 1 tablet by mouth 3 (Three) Times a Week.      calcium carbonate (OS-ROCIO) 600 MG tablet Take 1 tablet by mouth Daily.      cetirizine (zyrTEC) 10 MG tablet Take 1 tablet by mouth Daily As Needed for Allergies.      cholecalciferol (VITAMIN D3) 1000 UNITS tablet Take 1 tablet by mouth Daily.      coenzyme Q10 100 MG capsule Take 1 capsule by mouth Daily.      latanoprost (XALATAN) 0.005 % ophthalmic solution INSTIL 1 DROP IN EYE(S) AT BEDTIME      Magnesium Oxide 400 (240 Mg) MG tablet Take 1 tablet by mouth Daily.      Probiotic Product (PROBIOTIC DAILY PO) Take 1 tablet by mouth Daily.      Red Yeast Rice 600 MG capsule Take 2 capsules by  mouth Daily.      valsartan (Diovan) 320 MG tablet Take 1 tablet by mouth Daily.      Vitamin E 180 MG (400 UNIT) capsule capsule Take 1 capsule by mouth Daily.      Zinc 50 MG capsule Take 25 mg by mouth Daily.      [DISCONTINUED] valsartan (Diovan) 160 MG tablet Take 1.5 tablets by mouth Daily.         Review of Systems   Respiratory:  Negative for cough and shortness of breath.    Cardiovascular:  Negative for chest pain, palpitations and leg swelling.   Gastrointestinal:  Negative for constipation and diarrhea.   Neurological:  Negative for headaches.       Objective   Vitals:    01/03/25 0844   BP: 122/80   Pulse: 72   Resp: 18   Temp: 97.5 °F (36.4 °C)   SpO2: 98%      Wt Readings from Last 3 Encounters:   01/03/25 65.8 kg (145 lb)   12/06/24 66.7 kg (147 lb)   10/15/24 69.4 kg (153 lb)    Body mass index is 26.52 kg/m².      Physical Exam  Constitutional:       Appearance: Normal appearance. She is well-developed.   Neck:      Thyroid: No thyromegaly.   Cardiovascular:      Rate and Rhythm: Normal rate and regular rhythm.      Heart sounds: Normal heart sounds. No murmur heard.     No gallop.   Pulmonary:      Effort: Pulmonary effort is normal. No respiratory distress.      Breath sounds: Normal breath sounds. No wheezing or rales.   Neurological:      Mental Status: She is alert.           Problems Addressed this Visit          Cardiac and Vasculature    Hyperlipidemia (Chronic)                      Hypertension, essential - Primary                     Relevant Medications    valsartan (Diovan) 320 MG tablet       Endocrine and Metabolic    Impaired glucose tolerance                    Other Visit Diagnoses       Encounter for annual wellness exam in Medicare patient              Diagnoses         Codes Comments    Hypertension, essential    -  Primary ICD-10-CM: I10  ICD-9-CM: 401.9     Hyperlipidemia, unspecified hyperlipidemia type     ICD-10-CM: E78.5  ICD-9-CM: 272.4     Impaired glucose tolerance      ICD-10-CM: R73.02  ICD-9-CM: 790.22     Encounter for annual wellness exam in Medicare patient     ICD-10-CM: Z00.00  ICD-9-CM: V70.0           Assessment & Plan   In for 6-month recheck of hypertension, hyperlipidemia and impaired glucose tolerance today January 2025.  She recently increased Diovan from 240 mg daily to 320 mg daily in addition to her amlodipine 10 mg daily.  Those are reviewed today.  Blood pressure is excellent today and it running excellent at home.  Tends to run high in her cardiology office.  She has had ACE induced coughs in the past but is done fine with the Diovan.  Blood pressure is much improved now.  No changes made.  Annual wellness exam will be today January 2025.  Annual lab work was done June 2024.  She will have a glucose, A1c and lipids today January 2025 and we will also check a BMP for her cardiologist..  She declines flu shot.  Declines RSV vaccine and COVID-vaccine as well.  She has upcoming hysterectomy for bladder prolapse in March 2025.  She is fasting today.    The above information was reviewed again today 01/03/25.  It continues to be accurate as reflected above and is unchanged.  History, physical and review of systems all reviewed and are unchanged.  Medications were reviewed today and continue the current dosing.           Dragon disclaimer:   Part of this note may be an electronic transcription/translation of spoken language to printed text using the Dragon Dictation System.

## 2025-01-11 ENCOUNTER — PATIENT MESSAGE (OUTPATIENT)
Dept: CARDIOLOGY | Facility: CLINIC | Age: 75
End: 2025-01-11
Payer: MEDICARE

## 2025-01-17 ENCOUNTER — TELEMEDICINE (OUTPATIENT)
Dept: INTERNAL MEDICINE | Facility: CLINIC | Age: 75
End: 2025-01-17
Payer: MEDICARE

## 2025-01-17 DIAGNOSIS — J06.9 VIRAL UPPER RESPIRATORY TRACT INFECTION: Primary | ICD-10-CM

## 2025-01-17 PROCEDURE — 1159F MED LIST DOCD IN RCRD: CPT | Performed by: INTERNAL MEDICINE

## 2025-01-17 PROCEDURE — 1126F AMNT PAIN NOTED NONE PRSNT: CPT | Performed by: INTERNAL MEDICINE

## 2025-01-17 PROCEDURE — 1160F RVW MEDS BY RX/DR IN RCRD: CPT | Performed by: INTERNAL MEDICINE

## 2025-01-17 PROCEDURE — 99213 OFFICE O/P EST LOW 20 MIN: CPT | Performed by: INTERNAL MEDICINE

## 2025-01-17 RX ORDER — AZITHROMYCIN 250 MG/1
TABLET, FILM COATED ORAL
Qty: 6 TABLET | Refills: 0 | Status: SHIPPED | OUTPATIENT
Start: 2025-01-17

## 2025-01-17 NOTE — PROGRESS NOTES
Subjective   Sandi Acevedo is a 74 y.o. female.     Chief Complaint   Patient presents with    Cough    Sore Throat         Cough  This is a new problem. The current episode started in the past 7 days. The problem has been unchanged. Associated symptoms include rhinorrhea and a sore throat. Pertinent negatives include no chills or fever.   Sore Throat   Associated symptoms include coughing.        The following portions of the patient's history were reviewed and updated as appropriate: allergies, current medications, past social history and problem list.    Outpatient Medications Marked as Taking for the 1/17/25 encounter (Telemedicine) with Sheldon Dumont MD   Medication Sig Dispense Refill    amLODIPine (NORVASC) 10 MG tablet Take 1 tablet by mouth Daily. 90 tablet 3    ASPIRIN 81 PO Take 1 tablet by mouth Daily.      B COMPLEX-C ER PO Take 1 tablet by mouth 3 (Three) Times a Week.      calcium carbonate (OS-ROCIO) 600 MG tablet Take 1 tablet by mouth Daily.      cetirizine (zyrTEC) 10 MG tablet Take 1 tablet by mouth Daily As Needed for Allergies.      cholecalciferol (VITAMIN D3) 1000 UNITS tablet Take 1 tablet by mouth Daily.      coenzyme Q10 100 MG capsule Take 1 capsule by mouth Daily.      latanoprost (XALATAN) 0.005 % ophthalmic solution INSTIL 1 DROP IN EYE(S) AT BEDTIME      Magnesium Oxide 400 (240 Mg) MG tablet Take 1 tablet by mouth Daily.      Probiotic Product (PROBIOTIC DAILY PO) Take 1 tablet by mouth Daily.      Red Yeast Rice 600 MG capsule Take 2 capsules by mouth Daily.      valsartan (Diovan) 320 MG tablet Take 1 tablet by mouth Daily. 90 tablet 1    Vitamin E 180 MG (400 UNIT) capsule capsule Take 1 capsule by mouth Daily.      Zinc 50 MG capsule Take 25 mg by mouth Daily.         Review of Systems   Constitutional:  Negative for chills and fever.   HENT:  Positive for rhinorrhea and sore throat.    Respiratory:  Positive for cough.        Objective   There were no vitals filed for  this visit.   Wt Readings from Last 3 Encounters:   01/03/25 65.8 kg (145 lb)   12/06/24 66.7 kg (147 lb)   10/15/24 69.4 kg (153 lb)    There is no height or weight on file to calculate BMI.      Physical Exam  Constitutional:       Appearance: Normal appearance.   Pulmonary:      Effort: Pulmonary effort is normal.   Neurological:      Mental Status: She is alert.   Psychiatric:         Mood and Affect: Mood normal.         Behavior: Behavior normal.         Thought Content: Thought content normal.         Judgment: Judgment normal.           Problems Addressed this Visit    None  Visit Diagnoses       Viral upper respiratory tract infection    -  Primary          Diagnoses         Codes Comments    Viral upper respiratory tract infection    -  Primary ICD-10-CM: J06.9  ICD-9-CM: 465.9           Assessment & Plan   Video visit.  In today January 17, 2025 with a 4-day illness.  Someone was sick with a cough and came into the workplace and spread it around everybody else.  She developed a runny nose.  Cough.  Sore throat.  The sore throat is pretty bad right now.  Feels like she is swallowing glass.  She is a bit hoarse.  No fever or chills.  No shortness of breath.  She is taking Robitussin DM and Zyrtec right now.  She needs to add some Tylenol.  Will add a Z-Mina.    The above information was reviewed again today 01/17/25.  It continues to be accurate as reflected above and is unchanged.  History, physical and review of systems all reviewed and are unchanged.  Medications were reviewed today and continue the current dosing.               Dragon disclaimer:   Part of this note may be an electronic transcription/translation of spoken language to printed text using the Dragon Dictation System.

## 2025-02-12 ENCOUNTER — PATIENT MESSAGE (OUTPATIENT)
Dept: CARDIOLOGY | Facility: CLINIC | Age: 75
End: 2025-02-12
Payer: MEDICARE

## 2025-02-14 ENCOUNTER — PRE-ADMISSION TESTING (OUTPATIENT)
Dept: PREADMISSION TESTING | Facility: HOSPITAL | Age: 75
End: 2025-02-14
Payer: MEDICARE

## 2025-02-14 VITALS
HEIGHT: 63 IN | HEART RATE: 83 BPM | RESPIRATION RATE: 20 BRPM | SYSTOLIC BLOOD PRESSURE: 176 MMHG | WEIGHT: 146.6 LBS | BODY MASS INDEX: 25.98 KG/M2 | DIASTOLIC BLOOD PRESSURE: 73 MMHG | OXYGEN SATURATION: 98 % | TEMPERATURE: 98 F

## 2025-02-14 LAB
ANION GAP SERPL CALCULATED.3IONS-SCNC: 10.2 MMOL/L (ref 5–15)
BUN SERPL-MCNC: 15 MG/DL (ref 8–23)
BUN/CREAT SERPL: 17.2 (ref 7–25)
CALCIUM SPEC-SCNC: 9.4 MG/DL (ref 8.6–10.5)
CHLORIDE SERPL-SCNC: 105 MMOL/L (ref 98–107)
CO2 SERPL-SCNC: 23.8 MMOL/L (ref 22–29)
CREAT SERPL-MCNC: 0.87 MG/DL (ref 0.57–1)
DEPRECATED RDW RBC AUTO: 43.3 FL (ref 37–54)
EGFRCR SERPLBLD CKD-EPI 2021: 69.1 ML/MIN/1.73
ERYTHROCYTE [DISTWIDTH] IN BLOOD BY AUTOMATED COUNT: 13.4 % (ref 12.3–15.4)
GLUCOSE SERPL-MCNC: 126 MG/DL (ref 65–99)
HCT VFR BLD AUTO: 42.6 % (ref 34–46.6)
HGB BLD-MCNC: 14.2 G/DL (ref 12–15.9)
MCH RBC QN AUTO: 29.8 PG (ref 26.6–33)
MCHC RBC AUTO-ENTMCNC: 33.3 G/DL (ref 31.5–35.7)
MCV RBC AUTO: 89.3 FL (ref 79–97)
PLATELET # BLD AUTO: 310 10*3/MM3 (ref 140–450)
PMV BLD AUTO: 10.2 FL (ref 6–12)
POTASSIUM SERPL-SCNC: 4.3 MMOL/L (ref 3.5–5.2)
RBC # BLD AUTO: 4.77 10*6/MM3 (ref 3.77–5.28)
SODIUM SERPL-SCNC: 139 MMOL/L (ref 136–145)
WBC NRBC COR # BLD AUTO: 7.17 10*3/MM3 (ref 3.4–10.8)

## 2025-02-14 PROCEDURE — 85027 COMPLETE CBC AUTOMATED: CPT

## 2025-02-14 PROCEDURE — 80048 BASIC METABOLIC PNL TOTAL CA: CPT

## 2025-02-14 PROCEDURE — 36415 COLL VENOUS BLD VENIPUNCTURE: CPT

## 2025-03-05 ENCOUNTER — HOSPITAL ENCOUNTER (OUTPATIENT)
Facility: HOSPITAL | Age: 75
Setting detail: HOSPITAL OUTPATIENT SURGERY
Discharge: HOME | End: 2025-03-05
Attending: OBSTETRICS & GYNECOLOGY | Admitting: OBSTETRICS & GYNECOLOGY
Payer: MEDICARE

## 2025-03-05 ENCOUNTER — ANESTHESIA (OUTPATIENT)
Dept: PERIOP | Facility: HOSPITAL | Age: 75
End: 2025-03-05
Payer: MEDICARE

## 2025-03-05 ENCOUNTER — ANESTHESIA EVENT (OUTPATIENT)
Dept: PERIOP | Facility: HOSPITAL | Age: 75
End: 2025-03-05
Payer: MEDICARE

## 2025-03-05 VITALS
DIASTOLIC BLOOD PRESSURE: 59 MMHG | BODY MASS INDEX: 27.18 KG/M2 | WEIGHT: 147.7 LBS | OXYGEN SATURATION: 97 % | SYSTOLIC BLOOD PRESSURE: 142 MMHG | HEART RATE: 75 BPM | TEMPERATURE: 98.7 F | RESPIRATION RATE: 16 BRPM | HEIGHT: 62 IN

## 2025-03-05 DIAGNOSIS — N81.4 UTEROVAGINAL PROLAPSE: Primary | ICD-10-CM

## 2025-03-05 PROCEDURE — 25010000002 SUGAMMADEX 200 MG/2ML SOLUTION

## 2025-03-05 PROCEDURE — 25010000002 HYDROMORPHONE PER 4 MG

## 2025-03-05 PROCEDURE — 25810000003 LACTATED RINGERS PER 1000 ML: Performed by: ANESTHESIOLOGY

## 2025-03-05 PROCEDURE — 25010000002 EPINEPHRINE PER 0.1 MG: Performed by: OBSTETRICS & GYNECOLOGY

## 2025-03-05 PROCEDURE — 25010000002 MIDAZOLAM PER 1 MG: Performed by: ANESTHESIOLOGY

## 2025-03-05 PROCEDURE — 88305 TISSUE EXAM BY PATHOLOGIST: CPT | Performed by: OBSTETRICS & GYNECOLOGY

## 2025-03-05 PROCEDURE — 25010000002 ONDANSETRON PER 1 MG

## 2025-03-05 PROCEDURE — 25010000002 DEXAMETHASONE SODIUM PHOSPHATE 20 MG/5ML SOLUTION

## 2025-03-05 PROCEDURE — 25010000002 FENTANYL CITRATE (PF) 50 MCG/ML SOLUTION

## 2025-03-05 PROCEDURE — 25010000002 MAGNESIUM SULFATE PER 500 MG OF MAGNESIUM

## 2025-03-05 PROCEDURE — 25010000002 LIDOCAINE 2% SOLUTION

## 2025-03-05 PROCEDURE — 25010000002 CEFAZOLIN PER 500 MG: Performed by: OBSTETRICS & GYNECOLOGY

## 2025-03-05 PROCEDURE — 25810000003 LACTATED RINGERS PER 1000 ML

## 2025-03-05 PROCEDURE — 25010000002 PROPOFOL 10 MG/ML EMULSION

## 2025-03-05 RX ORDER — SODIUM CHLORIDE 0.9 % (FLUSH) 0.9 %
3 SYRINGE (ML) INJECTION EVERY 12 HOURS SCHEDULED
Status: DISCONTINUED | OUTPATIENT
Start: 2025-03-05 | End: 2025-03-05 | Stop reason: HOSPADM

## 2025-03-05 RX ORDER — HYDROCODONE BITARTRATE AND ACETAMINOPHEN 7.5; 325 MG/1; MG/1
1 TABLET ORAL EVERY 4 HOURS PRN
Status: DISCONTINUED | OUTPATIENT
Start: 2025-03-05 | End: 2025-03-05 | Stop reason: HOSPADM

## 2025-03-05 RX ORDER — ROCURONIUM BROMIDE 10 MG/ML
INJECTION, SOLUTION INTRAVENOUS AS NEEDED
Status: DISCONTINUED | OUTPATIENT
Start: 2025-03-05 | End: 2025-03-05 | Stop reason: SURG

## 2025-03-05 RX ORDER — HYDROMORPHONE HYDROCHLORIDE 1 MG/ML
0.25 INJECTION, SOLUTION INTRAMUSCULAR; INTRAVENOUS; SUBCUTANEOUS
Status: DISCONTINUED | OUTPATIENT
Start: 2025-03-05 | End: 2025-03-05 | Stop reason: HOSPADM

## 2025-03-05 RX ORDER — ATROPINE SULFATE 0.4 MG/ML
0.4 INJECTION, SOLUTION INTRAMUSCULAR; INTRAVENOUS; SUBCUTANEOUS ONCE AS NEEDED
Status: DISCONTINUED | OUTPATIENT
Start: 2025-03-05 | End: 2025-03-05 | Stop reason: HOSPADM

## 2025-03-05 RX ORDER — HYDRALAZINE HYDROCHLORIDE 20 MG/ML
5 INJECTION INTRAMUSCULAR; INTRAVENOUS
Status: DISCONTINUED | OUTPATIENT
Start: 2025-03-05 | End: 2025-03-05 | Stop reason: HOSPADM

## 2025-03-05 RX ORDER — FLUMAZENIL 0.1 MG/ML
0.2 INJECTION INTRAVENOUS AS NEEDED
Status: DISCONTINUED | OUTPATIENT
Start: 2025-03-05 | End: 2025-03-05 | Stop reason: HOSPADM

## 2025-03-05 RX ORDER — DEXMEDETOMIDINE HYDROCHLORIDE 100 UG/ML
INJECTION, SOLUTION INTRAVENOUS AS NEEDED
Status: DISCONTINUED | OUTPATIENT
Start: 2025-03-05 | End: 2025-03-05 | Stop reason: SURG

## 2025-03-05 RX ORDER — PROMETHAZINE HYDROCHLORIDE 25 MG/1
25 TABLET ORAL ONCE AS NEEDED
Status: DISCONTINUED | OUTPATIENT
Start: 2025-03-05 | End: 2025-03-05 | Stop reason: HOSPADM

## 2025-03-05 RX ORDER — FENTANYL CITRATE 50 UG/ML
25 INJECTION, SOLUTION INTRAMUSCULAR; INTRAVENOUS
Status: DISCONTINUED | OUTPATIENT
Start: 2025-03-05 | End: 2025-03-05 | Stop reason: HOSPADM

## 2025-03-05 RX ORDER — MAGNESIUM SULFATE HEPTAHYDRATE 500 MG/ML
INJECTION, SOLUTION INTRAMUSCULAR; INTRAVENOUS AS NEEDED
Status: DISCONTINUED | OUTPATIENT
Start: 2025-03-05 | End: 2025-03-05 | Stop reason: SURG

## 2025-03-05 RX ORDER — DIPHENHYDRAMINE HYDROCHLORIDE 50 MG/ML
12.5 INJECTION INTRAMUSCULAR; INTRAVENOUS
Status: DISCONTINUED | OUTPATIENT
Start: 2025-03-05 | End: 2025-03-05 | Stop reason: HOSPADM

## 2025-03-05 RX ORDER — FENTANYL CITRATE 50 UG/ML
50 INJECTION, SOLUTION INTRAMUSCULAR; INTRAVENOUS ONCE AS NEEDED
Status: DISCONTINUED | OUTPATIENT
Start: 2025-03-05 | End: 2025-03-05 | Stop reason: HOSPADM

## 2025-03-05 RX ORDER — LIDOCAINE HYDROCHLORIDE 10 MG/ML
0.5 INJECTION, SOLUTION INFILTRATION; PERINEURAL ONCE AS NEEDED
Status: DISCONTINUED | OUTPATIENT
Start: 2025-03-05 | End: 2025-03-05 | Stop reason: HOSPADM

## 2025-03-05 RX ORDER — PROMETHAZINE HYDROCHLORIDE 25 MG/1
25 SUPPOSITORY RECTAL ONCE AS NEEDED
Status: DISCONTINUED | OUTPATIENT
Start: 2025-03-05 | End: 2025-03-05 | Stop reason: HOSPADM

## 2025-03-05 RX ORDER — FENTANYL CITRATE 50 UG/ML
INJECTION, SOLUTION INTRAMUSCULAR; INTRAVENOUS AS NEEDED
Status: DISCONTINUED | OUTPATIENT
Start: 2025-03-05 | End: 2025-03-05 | Stop reason: SURG

## 2025-03-05 RX ORDER — DEXAMETHASONE SODIUM PHOSPHATE 4 MG/ML
INJECTION, SOLUTION INTRA-ARTICULAR; INTRALESIONAL; INTRAMUSCULAR; INTRAVENOUS; SOFT TISSUE AS NEEDED
Status: DISCONTINUED | OUTPATIENT
Start: 2025-03-05 | End: 2025-03-05 | Stop reason: SURG

## 2025-03-05 RX ORDER — FAMOTIDINE 10 MG/ML
20 INJECTION, SOLUTION INTRAVENOUS ONCE
Status: COMPLETED | OUTPATIENT
Start: 2025-03-05 | End: 2025-03-05

## 2025-03-05 RX ORDER — SODIUM CHLORIDE 0.9 % (FLUSH) 0.9 %
3-10 SYRINGE (ML) INJECTION AS NEEDED
Status: DISCONTINUED | OUTPATIENT
Start: 2025-03-05 | End: 2025-03-05 | Stop reason: HOSPADM

## 2025-03-05 RX ORDER — MIDAZOLAM HYDROCHLORIDE 1 MG/ML
0.5 INJECTION, SOLUTION INTRAMUSCULAR; INTRAVENOUS
Status: COMPLETED | OUTPATIENT
Start: 2025-03-05 | End: 2025-03-05

## 2025-03-05 RX ORDER — IPRATROPIUM BROMIDE AND ALBUTEROL SULFATE 2.5; .5 MG/3ML; MG/3ML
3 SOLUTION RESPIRATORY (INHALATION) ONCE AS NEEDED
Status: DISCONTINUED | OUTPATIENT
Start: 2025-03-05 | End: 2025-03-05 | Stop reason: HOSPADM

## 2025-03-05 RX ORDER — EPHEDRINE SULFATE 50 MG/ML
INJECTION, SOLUTION INTRAVENOUS AS NEEDED
Status: DISCONTINUED | OUTPATIENT
Start: 2025-03-05 | End: 2025-03-05 | Stop reason: SURG

## 2025-03-05 RX ORDER — DEXTROSE MONOHYDRATE 25 G/50ML
INJECTION, SOLUTION INTRAVENOUS AS NEEDED
Status: DISCONTINUED | OUTPATIENT
Start: 2025-03-05 | End: 2025-03-05 | Stop reason: HOSPADM

## 2025-03-05 RX ORDER — PROPOFOL 10 MG/ML
VIAL (ML) INTRAVENOUS AS NEEDED
Status: DISCONTINUED | OUTPATIENT
Start: 2025-03-05 | End: 2025-03-05 | Stop reason: SURG

## 2025-03-05 RX ORDER — LIDOCAINE HYDROCHLORIDE 20 MG/ML
INJECTION, SOLUTION INFILTRATION; PERINEURAL AS NEEDED
Status: DISCONTINUED | OUTPATIENT
Start: 2025-03-05 | End: 2025-03-05 | Stop reason: SURG

## 2025-03-05 RX ORDER — SODIUM CHLORIDE, SODIUM LACTATE, POTASSIUM CHLORIDE, CALCIUM CHLORIDE 600; 310; 30; 20 MG/100ML; MG/100ML; MG/100ML; MG/100ML
INJECTION, SOLUTION INTRAVENOUS CONTINUOUS PRN
Status: DISCONTINUED | OUTPATIENT
Start: 2025-03-05 | End: 2025-03-05 | Stop reason: SURG

## 2025-03-05 RX ORDER — LABETALOL HYDROCHLORIDE 5 MG/ML
5 INJECTION, SOLUTION INTRAVENOUS
Status: DISCONTINUED | OUTPATIENT
Start: 2025-03-05 | End: 2025-03-05 | Stop reason: HOSPADM

## 2025-03-05 RX ORDER — NALOXONE HCL 0.4 MG/ML
0.2 VIAL (ML) INJECTION AS NEEDED
Status: DISCONTINUED | OUTPATIENT
Start: 2025-03-05 | End: 2025-03-05 | Stop reason: HOSPADM

## 2025-03-05 RX ORDER — HYDROCODONE BITARTRATE AND ACETAMINOPHEN 5; 325 MG/1; MG/1
1 TABLET ORAL ONCE AS NEEDED
Status: COMPLETED | OUTPATIENT
Start: 2025-03-05 | End: 2025-03-05

## 2025-03-05 RX ORDER — SODIUM CHLORIDE, SODIUM LACTATE, POTASSIUM CHLORIDE, CALCIUM CHLORIDE 600; 310; 30; 20 MG/100ML; MG/100ML; MG/100ML; MG/100ML
9 INJECTION, SOLUTION INTRAVENOUS CONTINUOUS
Status: DISCONTINUED | OUTPATIENT
Start: 2025-03-05 | End: 2025-03-05 | Stop reason: HOSPADM

## 2025-03-05 RX ORDER — EPHEDRINE SULFATE 50 MG/ML
5 INJECTION, SOLUTION INTRAVENOUS ONCE AS NEEDED
Status: DISCONTINUED | OUTPATIENT
Start: 2025-03-05 | End: 2025-03-05 | Stop reason: HOSPADM

## 2025-03-05 RX ORDER — ONDANSETRON 2 MG/ML
4 INJECTION INTRAMUSCULAR; INTRAVENOUS ONCE AS NEEDED
Status: DISCONTINUED | OUTPATIENT
Start: 2025-03-05 | End: 2025-03-05 | Stop reason: HOSPADM

## 2025-03-05 RX ORDER — OXYCODONE HYDROCHLORIDE 5 MG/1
5 TABLET ORAL EVERY 6 HOURS PRN
Qty: 12 TABLET | Refills: 0 | Status: SHIPPED | OUTPATIENT
Start: 2025-03-05 | End: 2026-03-05

## 2025-03-05 RX ORDER — ONDANSETRON 2 MG/ML
INJECTION INTRAMUSCULAR; INTRAVENOUS AS NEEDED
Status: DISCONTINUED | OUTPATIENT
Start: 2025-03-05 | End: 2025-03-05 | Stop reason: SURG

## 2025-03-05 RX ADMIN — MIDAZOLAM 0.5 MG: 1 INJECTION INTRAMUSCULAR; INTRAVENOUS at 06:39

## 2025-03-05 RX ADMIN — FENTANYL CITRATE 25 MCG: 50 INJECTION, SOLUTION INTRAMUSCULAR; INTRAVENOUS at 11:02

## 2025-03-05 RX ADMIN — HYDROCODONE BITARTRATE AND ACETAMINOPHEN 1 TABLET: 5; 325 TABLET ORAL at 13:56

## 2025-03-05 RX ADMIN — DEXMEDETOMIDINE HYDROCHLORIDE 6 MCG: 100 INJECTION, SOLUTION INTRAVENOUS at 08:22

## 2025-03-05 RX ADMIN — FENTANYL CITRATE 50 MCG: 50 INJECTION, SOLUTION INTRAMUSCULAR; INTRAVENOUS at 09:03

## 2025-03-05 RX ADMIN — DEXMEDETOMIDINE HYDROCHLORIDE 8 MCG: 100 INJECTION, SOLUTION INTRAVENOUS at 08:14

## 2025-03-05 RX ADMIN — MIDAZOLAM 0.5 MG: 1 INJECTION INTRAMUSCULAR; INTRAVENOUS at 07:11

## 2025-03-05 RX ADMIN — DEXMEDETOMIDINE HYDROCHLORIDE 4 MCG: 100 INJECTION, SOLUTION INTRAVENOUS at 10:12

## 2025-03-05 RX ADMIN — ROCURONIUM BROMIDE 50 MG: 10 INJECTION INTRAVENOUS at 07:36

## 2025-03-05 RX ADMIN — DEXAMETHASONE SODIUM PHOSPHATE 8 MG: 4 INJECTION, SOLUTION INTRAMUSCULAR; INTRAVENOUS at 07:45

## 2025-03-05 RX ADMIN — HYDROMORPHONE HYDROCHLORIDE 0.25 MG: 1 INJECTION, SOLUTION INTRAMUSCULAR; INTRAVENOUS; SUBCUTANEOUS at 11:09

## 2025-03-05 RX ADMIN — SODIUM CHLORIDE, SODIUM LACTATE, POTASSIUM CHLORIDE, AND CALCIUM CHLORIDE 9 ML/HR: 600; 310; 30; 20 INJECTION, SOLUTION INTRAVENOUS at 06:38

## 2025-03-05 RX ADMIN — FENTANYL CITRATE 50 MCG: 50 INJECTION, SOLUTION INTRAMUSCULAR; INTRAVENOUS at 09:35

## 2025-03-05 RX ADMIN — SUGAMMADEX 200 MG: 100 INJECTION, SOLUTION INTRAVENOUS at 10:47

## 2025-03-05 RX ADMIN — ROCURONIUM BROMIDE 10 MG: 10 INJECTION INTRAVENOUS at 08:14

## 2025-03-05 RX ADMIN — FAMOTIDINE 20 MG: 10 INJECTION INTRAVENOUS at 06:39

## 2025-03-05 RX ADMIN — SODIUM CHLORIDE 2 G: 900 INJECTION INTRAVENOUS at 07:28

## 2025-03-05 RX ADMIN — ROCURONIUM BROMIDE 5 MG: 10 INJECTION INTRAVENOUS at 10:04

## 2025-03-05 RX ADMIN — EPHEDRINE SULFATE 5 MG: 50 INJECTION INTRAVENOUS at 08:09

## 2025-03-05 RX ADMIN — MAGNESIUM SULFATE HEPTAHYDRATE 2 G: 500 INJECTION, SOLUTION INTRAMUSCULAR; INTRAVENOUS at 07:48

## 2025-03-05 RX ADMIN — FENTANYL CITRATE 50 MCG: 50 INJECTION, SOLUTION INTRAMUSCULAR; INTRAVENOUS at 07:34

## 2025-03-05 RX ADMIN — ONDANSETRON 4 MG: 2 INJECTION, SOLUTION INTRAMUSCULAR; INTRAVENOUS at 07:45

## 2025-03-05 RX ADMIN — DEXMEDETOMIDINE HYDROCHLORIDE 6 MCG: 100 INJECTION, SOLUTION INTRAVENOUS at 10:27

## 2025-03-05 RX ADMIN — EPHEDRINE SULFATE 5 MG: 50 INJECTION INTRAVENOUS at 08:44

## 2025-03-05 RX ADMIN — PROPOFOL 50 MG: 10 INJECTION, EMULSION INTRAVENOUS at 09:28

## 2025-03-05 RX ADMIN — DEXMEDETOMIDINE HYDROCHLORIDE 6 MCG: 100 INJECTION, SOLUTION INTRAVENOUS at 08:18

## 2025-03-05 RX ADMIN — SODIUM CHLORIDE, POTASSIUM CHLORIDE, SODIUM LACTATE AND CALCIUM CHLORIDE: 600; 310; 30; 20 INJECTION, SOLUTION INTRAVENOUS at 07:28

## 2025-03-05 RX ADMIN — HYDROMORPHONE HYDROCHLORIDE 0.25 MG: 1 INJECTION, SOLUTION INTRAMUSCULAR; INTRAVENOUS; SUBCUTANEOUS at 11:04

## 2025-03-05 RX ADMIN — PROPOFOL 150 MG: 10 INJECTION, EMULSION INTRAVENOUS at 07:34

## 2025-03-05 RX ADMIN — PROPOFOL 50 MG: 10 INJECTION, EMULSION INTRAVENOUS at 08:18

## 2025-03-05 RX ADMIN — FENTANYL CITRATE 25 MCG: 50 INJECTION, SOLUTION INTRAMUSCULAR; INTRAVENOUS at 11:07

## 2025-03-05 RX ADMIN — LIDOCAINE HYDROCHLORIDE 100 MG: 20 INJECTION, SOLUTION INFILTRATION; PERINEURAL at 07:34

## 2025-03-05 NOTE — ANESTHESIA POSTPROCEDURE EVALUATION
Patient: Sandi Acevedo    Procedure Summary       Date: 03/05/25 Room / Location: [facility] OR  / [facility] MAIN OR    Anesthesia Start: 0728 Anesthesia Stop: 1100    Procedures:       TOTAL VAGINAL HYSTERECTOMY, BILATERAL SALPINGO-OOPHORECTOMY, COLPOCLEISIS, CYSTOSCOPY (Bilateral: Uterus)      COLPOCLEISIS (Vagina) Diagnosis:     Surgeons: Desean Sanders MD Provider: Gabby Bauer MD    Anesthesia Type: general ASA Status: 3            Anesthesia Type: general    Vitals  Vitals Value Taken Time   /51 03/05/25 1115   Temp 37.1 °C (98.7 °F) 03/05/25 1100   Pulse 67 03/05/25 1128   Resp 16 03/05/25 1115   SpO2 97 % 03/05/25 1128   Vitals shown include unfiled device data.        Post Anesthesia Care and Evaluation    Anesthetic complications: No anesthetic complications

## 2025-03-05 NOTE — ANESTHESIA PREPROCEDURE EVALUATION
Anesthesia Evaluation     no history of anesthetic complications:                Airway   TM distance: >3 FB  Neck ROM: full  Dental - normal exam     Pulmonary    (-) shortness of breath, recent URI, not a smoker  Cardiovascular     (+) hypertension, hyperlipidemia  (-) dysrhythmias, angina    ROS comment: Q waves 3 and avf, no change,  nl echo and stress    Neuro/Psych  (+) numbness (sciatica), psychiatric history (unusal level of anxiety)  (-) dizziness/light headedness, syncope  GI/Hepatic/Renal/Endo    (-) liver disease, no renal disease, diabetes    Musculoskeletal     Abdominal    Substance History      OB/GYN          Other   arthritis,       Other Comment: glaucoma  ROS/Med Hx Other: Request for short acting anesthesia and quick wake up                Anesthesia Plan    ASA 3     general     intravenous induction     Anesthetic plan, risks, benefits, and alternatives have been provided, discussed and informed consent has been obtained with: patient.      CODE STATUS:

## 2025-03-05 NOTE — OP NOTE
Pre-op Dx:    1. pelvic organ prolapse     Post-op Dx:  same     Procedure:   1) Total Vaginal hysterectomy, bilateral salpingo-oophorectomy, total colpectomy, colpocleisis, anterior colporrhaphy, posterior colporrhaphy, levator myorrhaphy, perineorrhaphy, cystoscopy     Surgeon: Dr. Desean Sanders  Asst: Dr. Wenceslao Corbett  Anes: GETA  Antibiotics: Ancef 2g IV   IVF: 1200cc  EBL: 200cc  Findings:  c point +10 with numerous ulcerations, uterus, small, mobile and anteverted. Now s/p above procedure   On cystoscopy bladder intact and ureters patent bilaterally.    Specimen: Uterus, Cervix, bilateral fallopian tubes and ovaries  Complications: none  Status: Stable to PACU     PROCEDURE:     Consents were reviewed in the preoperative area and the preoperative antibiotics were given after the risks associated with the procedure were discussed with the patient (which include bleeding, infection, failure of the procedure and need for repeat surgery in the future, as well as injury to surrounding tissue had been discussed and consents confirmed. The patient was taken to the operating room, where sequential compression boots were placed and pumping prior to the induction of anesthesia. The patient was placed under general endotracheal anesthesia and placed in dorsal lithotomy position in [facility]. A time out was performed and the patient was prepped and draped in a sterile fashion. A Kyle hugger was placed to maintain core body temperature. A Simpson catheter was inserted into the bladder.       The vaginal Bookwalter was then set up in the usual fashion to improve exposure. The lateral vaginal retractors were placed on either side of the vagina close to the fourchette to provide retraction. A Jesu tenaculum was placed on the anterior lip of the cervix and another on the posterior lip of the cervix. The vaginal epithelium at the cervicovaginal junction was infiltrated with a diluted solution of epinephrine (0.625  mg in 500 mL of normal saline). A circumferential incision was made at the cervicovaginal junction with the scalpel. The bladder was then dissected away from the cervix using the Metzenbaum scissors. An anterior blade was then placed to lift the bladder away. The bladder pillars were then taken down using the Bovie electrocautery. The vaginal epithelium was pushed further cephalad using blunt dissection.       Attention was then turned to the posterior vagina, where posterior colpotomy was then completed by grasping the peritoneum with pickups and incising the peritoneum with the Harper scissors. The scissors was opened and the posterior blade was placed under the scissors into the posterior cul-de-sac and withdrawing the scissor as the blade was been placed. The uterosacral ligaments were grasped, cauterized and divided with the Enseal. The cardinal ligaments were then grasped, cauterized and divided with the Enseal. Attention was then turned to the anterior vagina, where the bladder was further dissected away from the cervix until the vesicouterine fold of the peritoneum was identified. The fold was then picked up using the Debakey and the anterior cul-de-sac was entered. The anterior retractor was then placed. The uterine arteries were then identified, grasped, cauterized and divided with the Enseal. This was done until the utero-ovarian ligaments were isolated. The utero-ovarian ligaments were clamped with Javid clamps, cut, and suture ligated using #0 Vicryl suture. The uterus and cervix were sent to Pathology.       At this point, we began to survey the ovaries. The right ovary was visualized and was felt to be able to be removed safely. The right tube and ovary were then grasped with a ring forceps and dissected away from the pelvic sidewall so that the infundibulopelvic ligament was isolated. Using a right angle clamp, the mesosalpinx was identified and the clamp passed behind the ovary to expose the  mesosalpinx. The mesosalpinx was incised using the Bovie. The right infundibulopelvic ligament was then grasped, cauterized and divided with the Enseal. The was repeated on the left. The specimens were handed off to and sent to pathology. Excellent hemostasis was noted at the pedicles. The colpotomy was then closed with 0 vicryl suture in a running fashion with care taken to ensure approximation of the epithelium to the peritoneum with closure.    Attention was turned to the total colpectomy, colpocleisis, anterior colporrhaphy, posterior colporrhaphy, levator myorrhaphy, and perineorrhaphy. The vagina was then inspected and the sites of closure were identified.  The anterior and posterior aspects of the vagina were then injected with a dilute (1:800,000) solution of epinephrine.  This was a partial-thickness injection.  The anterior vaginal wall was then incised with the scalpel.  The entirety of the anterior vaginal epithelium was then removed, again in a partial-thickness dissection.  In a similar fashion the posterior epithelium was incised and dissected off the posterior vagina in a partial-thickness manner.  A series of 0 Vicryl sutures were then used to obliterate the hernia sac. Sutures were started anteriorly at the vaginal introitus and carried to the apex of the hernia sac before being redirected to the posterior vaginal introitus.  As the hernia sac was reduced, the sutures were then tied medially to laterally thus obliterating the hernia sac and reducing the prolapse.     Attention was then turned to the posterior colporrhaphy, levator myorrhaphy, and perineorrhaphy. Following the injection in the perineal body and posterior vaginal wall with the same dilute epinephrine solution. An inverted triangular incision was made along the perineal body and carried up to the posterior vaginal wall. The vaginal mucosa was dissected off the underlying endopelvic connected tissue in the midline using the Metzenbaum  scissors. The excess vaginal mucosa was excised. The mucosa and underlying tissue was plicated in the midline using an 0 Vicryl in a series of interrupted stitches. We then dissected out laterally to reach the levator muscles at the level of the hymen. These were plicated in the midline using 0 Vicryl in an interrupted fashion. We then reapproximated the perineal body using a #1 Vicryl in an interrupted fashion. The perineal skin was then reapproximated using a 3-0 Vicryl in a running subcuticular fashion.      Cystoscopy was then performed using a 70° cystoscope.  150 mL of D50 solution was inserted and the bladder was inspected and a clock-like and systematic fashion. Bilateral ureteral orifices were identified and brisk ureteral jets were noted bilaterally.     A Simpson catheter was then replaced and subsequently removed.  The patient tolerated the procedure well.  All instrument sponge and needle counts were correct ×2.  The patient was awakened from anesthesia and taken the recovery room in stable condition.    Wenceslao Corbett MD  [facility] PGY7

## 2025-03-05 NOTE — DISCHARGE INSTRUCTIONS
"After the surgery you may:  -Shower  -Walk around the house or no normal activity (light exercise).  Listen to your body and, when tired, stop or rest.  You will have less energy and feel more \"dizzyness\" in the weeks after surgery than is normal for you.  -Drive in 1-2 weeks when you are off pain medication that has narcotics (like Percocet or Vicodin)  -Go up and down stairs  -Lift normal household objects or light people (gallon of milk, laundry basket, tools, infant or toddler)    Please do NOT:  -Place anything in the vagina for 6 weeks after surgery  -Lift >50 pounds (furniture, heavy dogs, or other adult people)    Please contact physician if any of the following  -Nausea/vomitng  -Lack of gas or BM for >24 hours; keep in mind may take 5-7 days after surgery to have bowel movement.  May take 17 grams miralax nightly (safe to take nightly) or dulcolax 10 mg by mouth if that does not help with hard stool or constipation.  -Severe burning or urgency of urination or blood in urine  -Temperature >100 degrees Fahrenheit.  -Severe vaginal bleeding (>1 pad/hour)  -Pain that is not relieved by your regular pain medications  -Fainting or \"passing out\"  -Severe chest pain or shortness of breath with minimal activity    We are always available in the clinic during daytime hours [phone] or during nights and weekends on the answering service [phone].  There is always a doctor on call available to help you who will know the type of surgery you had and how to address your problem.     "

## 2025-03-05 NOTE — ANESTHESIA PROCEDURE NOTES
Airway  Urgency: elective    Date/Time: 3/5/2025 7:39 AM  Airway not difficult    General Information and Staff    Patient location during procedure: OR  Anesthesiologist: Gabby Bauer MD  CRNA/CAA: Yarelis Martinez CRNA    Indications and Patient Condition  Indications for airway management: airway protection    Preoxygenated: yes  MILS maintained throughout  Mask difficulty assessment: 2 - vent by mask + OA or adjuvant +/- NMBA    Final Airway Details  Final airway type: endotracheal airway      Successful airway: ETT  Cuffed: yes   Successful intubation technique: direct laryngoscopy  Facilitating devices/methods: intubating stylet  Endotracheal tube insertion site: oral  Blade: Cortes  Blade size: 2  ETT size (mm): 7.0  Cormack-Lehane Classification: grade IIa - partial view of glottis  Placement verified by: chest auscultation and capnometry   Inital cuff pressure (cm H2O): 25  Measured from: lips  ETT/EBT  to lips (cm): 21  Number of attempts at approach: 1  Assessment: lips, teeth, and gum same as pre-op and atraumatic intubation

## 2025-03-06 LAB
CYTO UR: NORMAL
LAB AP CASE REPORT: NORMAL
LAB AP CLINICAL INFORMATION: NORMAL
PATH REPORT.FINAL DX SPEC: NORMAL
PATH REPORT.GROSS SPEC: NORMAL

## 2025-04-29 RX ORDER — VALSARTAN 320 MG/1
320 TABLET ORAL DAILY
Qty: 90 TABLET | Refills: 2 | Status: SHIPPED | OUTPATIENT
Start: 2025-04-29

## 2025-05-27 DIAGNOSIS — Z78.0 MENOPAUSE: Primary | ICD-10-CM

## 2025-07-11 ENCOUNTER — LAB (OUTPATIENT)
Dept: LAB | Facility: HOSPITAL | Age: 75
End: 2025-07-11
Payer: MEDICARE

## 2025-07-11 ENCOUNTER — HOSPITAL ENCOUNTER (OUTPATIENT)
Dept: BONE DENSITY | Facility: HOSPITAL | Age: 75
Discharge: HOME | End: 2025-07-11
Payer: MEDICARE

## 2025-07-11 ENCOUNTER — OFFICE VISIT (OUTPATIENT)
Dept: INTERNAL MEDICINE | Facility: CLINIC | Age: 75
End: 2025-07-11
Payer: MEDICARE

## 2025-07-11 VITALS
HEIGHT: 62 IN | RESPIRATION RATE: 16 BRPM | BODY MASS INDEX: 26.5 KG/M2 | SYSTOLIC BLOOD PRESSURE: 132 MMHG | OXYGEN SATURATION: 97 % | TEMPERATURE: 98 F | WEIGHT: 144 LBS | DIASTOLIC BLOOD PRESSURE: 78 MMHG | HEART RATE: 66 BPM

## 2025-07-11 DIAGNOSIS — Z79.899 MEDICATION MANAGEMENT: ICD-10-CM

## 2025-07-11 DIAGNOSIS — E78.5 HYPERLIPIDEMIA, UNSPECIFIED HYPERLIPIDEMIA TYPE: Chronic | ICD-10-CM

## 2025-07-11 DIAGNOSIS — R73.02 IMPAIRED GLUCOSE TOLERANCE: ICD-10-CM

## 2025-07-11 DIAGNOSIS — I10 HYPERTENSION, ESSENTIAL: Primary | ICD-10-CM

## 2025-07-11 LAB
ALBUMIN SERPL-MCNC: 4.4 G/DL (ref 3.5–5.2)
ALBUMIN/GLOB SERPL: 1.5 G/DL
ALP SERPL-CCNC: 108 U/L (ref 39–117)
ALT SERPL W P-5'-P-CCNC: 15 U/L (ref 1–33)
ANION GAP SERPL CALCULATED.3IONS-SCNC: 10 MMOL/L (ref 5–15)
AST SERPL-CCNC: 29 U/L (ref 1–32)
BASOPHILS # BLD AUTO: 0.06 10*3/MM3 (ref 0–0.2)
BASOPHILS NFR BLD AUTO: 0.9 % (ref 0–1.5)
BILIRUB SERPL-MCNC: 0.7 MG/DL (ref 0–1.2)
BUN SERPL-MCNC: 15 MG/DL (ref 8–23)
BUN/CREAT SERPL: 17.4 (ref 7–25)
CALCIUM SPEC-SCNC: 9.7 MG/DL (ref 8.6–10.5)
CHLORIDE SERPL-SCNC: 103 MMOL/L (ref 98–107)
CHOLEST SERPL-MCNC: 205 MG/DL (ref 0–200)
CO2 SERPL-SCNC: 25 MMOL/L (ref 22–29)
CREAT SERPL-MCNC: 0.86 MG/DL (ref 0.57–1)
DEPRECATED RDW RBC AUTO: 45.5 FL (ref 37–54)
EGFRCR SERPLBLD CKD-EPI 2021: 70.1 ML/MIN/1.73
EOSINOPHIL # BLD AUTO: 0.12 10*3/MM3 (ref 0–0.4)
EOSINOPHIL NFR BLD AUTO: 1.8 % (ref 0.3–6.2)
ERYTHROCYTE [DISTWIDTH] IN BLOOD BY AUTOMATED COUNT: 13.9 % (ref 12.3–15.4)
GLOBULIN UR ELPH-MCNC: 3 GM/DL
GLUCOSE SERPL-MCNC: 102 MG/DL (ref 65–99)
HBA1C MFR BLD: 6 % (ref 4.8–5.6)
HCT VFR BLD AUTO: 45 % (ref 34–46.6)
HDLC SERPL QL: 3.8
HDLC SERPL-MCNC: 54 MG/DL (ref 40–60)
HGB BLD-MCNC: 14.5 G/DL (ref 12–15.9)
IMM GRANULOCYTES # BLD AUTO: 0.01 10*3/MM3 (ref 0–0.05)
IMM GRANULOCYTES NFR BLD AUTO: 0.1 % (ref 0–0.5)
LDLC SERPL CALC-MCNC: 133 MG/DL (ref 0–100)
LYMPHOCYTES # BLD AUTO: 1.73 10*3/MM3 (ref 0.7–3.1)
LYMPHOCYTES NFR BLD AUTO: 25.3 % (ref 19.6–45.3)
MCH RBC QN AUTO: 29.1 PG (ref 26.6–33)
MCHC RBC AUTO-ENTMCNC: 32.2 G/DL (ref 31.5–35.7)
MCV RBC AUTO: 90.2 FL (ref 79–97)
MONOCYTES # BLD AUTO: 0.54 10*3/MM3 (ref 0.1–0.9)
MONOCYTES NFR BLD AUTO: 7.9 % (ref 5–12)
NEUTROPHILS NFR BLD AUTO: 4.39 10*3/MM3 (ref 1.7–7)
NEUTROPHILS NFR BLD AUTO: 64 % (ref 42.7–76)
NRBC BLD AUTO-RTO: 0 /100 WBC (ref 0–0.2)
PLATELET # BLD AUTO: 305 10*3/MM3 (ref 140–450)
PMV BLD AUTO: 10.9 FL (ref 6–12)
POTASSIUM SERPL-SCNC: 3.9 MMOL/L (ref 3.5–5.2)
PROT SERPL-MCNC: 7.4 G/DL (ref 6–8.5)
RBC # BLD AUTO: 4.99 10*6/MM3 (ref 3.77–5.28)
SODIUM SERPL-SCNC: 138 MMOL/L (ref 136–145)
TRIGL SERPL-MCNC: 98 MG/DL (ref 0–150)
TSH SERPL DL<=0.05 MIU/L-ACNC: 2.95 UIU/ML (ref 0.27–4.2)
VLDLC SERPL-MCNC: 18 MG/DL (ref 5–40)
WBC NRBC COR # BLD AUTO: 6.85 10*3/MM3 (ref 3.4–10.8)

## 2025-07-11 PROCEDURE — 80053 COMPREHEN METABOLIC PANEL: CPT | Performed by: INTERNAL MEDICINE

## 2025-07-11 PROCEDURE — 85025 COMPLETE CBC W/AUTO DIFF WBC: CPT | Performed by: INTERNAL MEDICINE

## 2025-07-11 PROCEDURE — 80061 LIPID PANEL: CPT | Performed by: INTERNAL MEDICINE

## 2025-07-11 PROCEDURE — 84443 ASSAY THYROID STIM HORMONE: CPT | Performed by: INTERNAL MEDICINE

## 2025-07-11 PROCEDURE — 36415 COLL VENOUS BLD VENIPUNCTURE: CPT | Performed by: INTERNAL MEDICINE

## 2025-07-11 PROCEDURE — 83036 HEMOGLOBIN GLYCOSYLATED A1C: CPT | Performed by: INTERNAL MEDICINE

## 2025-07-11 PROCEDURE — 77080 DXA BONE DENSITY AXIAL: CPT

## 2025-07-11 NOTE — PROGRESS NOTES
Subjective   Sandi Acevedo is a 74 y.o. female.     Chief Complaint   Patient presents with    Hypertension         Hypertension  Chronicity:  Chronic  Associated symptoms: no chest pain, no headaches, no palpitations and no shortness of breath    Hyperlipidemia  This is a chronic problem. The current episode started more than 1 year ago. Pertinent negatives include no chest pain or shortness of breath.   Coronary Artery Disease  Presents for follow-up visit. Pertinent negatives include no chest pain, leg swelling, palpitations or shortness of breath.   Hyperglycemia  This is a chronic problem. The current episode started more than 1 year ago. Pertinent negatives include no chest pain, coughing or headaches.        The following portions of the patient's history were reviewed and updated as appropriate: allergies, current medications, past social history and problem list.    Outpatient Medications Marked as Taking for the 7/11/25 encounter (Office Visit) with Sheldon Dumont MD   Medication Sig Dispense Refill    amLODIPine (NORVASC) 10 MG tablet Take 1 tablet by mouth Daily. (Patient taking differently: Take 1 tablet by mouth Every Night.) 90 tablet 3    ASPIRIN 81 PO Take 1 tablet by mouth Daily. HOLD FOR SURGERY ONE WEEK PER DR. KATIA NAVAS ER PO Take 1 tablet by mouth 3 (Three) Times a Week. HOLD FOR SURGERY X 1 WEEK      calcium carbonate (OS-ROCIO) 600 MG tablet Take 1 tablet by mouth 2 (Two) Times a Week. HOLD FOR SURGERY X 1 WEEK      cetirizine (zyrTEC) 10 MG tablet Take 1 tablet by mouth Daily As Needed for Allergies.      cholecalciferol (VITAMIN D3) 1000 UNITS tablet Take 1 tablet by mouth Daily. HOLD FOR SURGERY X 1 WEEK      coenzyme Q10 100 MG capsule Take 1 capsule by mouth Daily. HOLD FOR SURGERY X 1 WEEK      latanoprost (XALATAN) 0.005 % ophthalmic solution Administer 1 drop to both eyes Every Night. GLAUCOMA      Magnesium Oxide 400 (240 Mg) MG tablet Take 1 tablet by mouth  Daily. HOLD FOR SURGERY X 1 WEEK      Probiotic Product (PROBIOTIC DAILY PO) Take 1 tablet by mouth Daily.      Red Yeast Rice 600 MG capsule Take 2 capsules by mouth Daily. HOLD FOR SURGERY X 1 WEEK      valsartan (Diovan) 320 MG tablet Take 1 tablet by mouth Daily. 90 tablet 2    Vitamin E 180 MG (400 UNIT) capsule capsule Take 1 capsule by mouth Daily. HOLD FOR SURGERY X 1 WEEK      Zinc 50 MG capsule Take 50 mg by mouth Daily. HOLD FOR SURGERY X 1 WEEK         Review of Systems   Respiratory:  Negative for cough and shortness of breath.    Cardiovascular:  Negative for chest pain, palpitations and leg swelling.   Gastrointestinal:  Negative for constipation and diarrhea.   Neurological:  Negative for headaches.       Objective   Vitals:    07/11/25 0802   BP: 132/78   Pulse: 66   Resp: 16   Temp: 98 °F (36.7 °C)   SpO2: 97%      Wt Readings from Last 3 Encounters:   07/11/25 65.3 kg (144 lb)   03/05/25 67 kg (147 lb 11.2 oz)   02/14/25 66.5 kg (146 lb 9.6 oz)    Body mass index is 26.34 kg/m².      Physical Exam  Constitutional:       Appearance: Normal appearance. She is well-developed.   Neck:      Thyroid: No thyromegaly.   Cardiovascular:      Rate and Rhythm: Normal rate and regular rhythm.      Heart sounds: Normal heart sounds. No murmur heard.     No gallop.   Pulmonary:      Effort: Pulmonary effort is normal. No respiratory distress.      Breath sounds: Normal breath sounds. No wheezing or rales.   Neurological:      Mental Status: She is alert.           Problems Addressed this Visit          Cardiac and Vasculature    Hyperlipidemia (Chronic)    Hypertension, essential - Primary       Endocrine and Metabolic    Impaired glucose tolerance     Diagnoses         Codes Comments      Hypertension, essential    -  Primary ICD-10-CM: I10  ICD-9-CM: 401.9       Hyperlipidemia, unspecified hyperlipidemia type     ICD-10-CM: E78.5  ICD-9-CM: 272.4       Impaired glucose tolerance     ICD-10-CM:  R73.02  ICD-9-CM: 790.22           Assessment & Plan   In for 6-month recheck of hypertension, hyperlipidemia, anxiety and impaired glucose tolerance today July 2025.  Current dose of Diovan is 320 mg daily in addition to her amlodipine 10 mg daily.  Those are reviewed today.  Blood pressure is excellent today and it running excellent at home.  Tends to run high in her cardiology office.  She has had ACE induced coughs in the past but has done fine with the Diovan.  Blood pressure is much improved now.  No changes made.  Annual wellness exam will be January 2025.  Annual lab work is due today July 2025.  She will have a glucose, A1c and lipids today January 2025 and we will also check a BMP for her cardiologist..  She declines flu shot.  Declines RSV vaccine and COVID-vaccine as well.  She is fasting today.    The above information was reviewed again today 07/11/25.  It continues to be accurate as reflected above and is unchanged.  History, physical and review of systems all reviewed and are unchanged.  Medications were reviewed today and continue the current dosing.           Dragon disclaimer:   Part of this note may be an electronic transcription/translation of spoken language to printed text using the Dragon Dictation System.

## 2025-07-14 NOTE — PROGRESS NOTES
RM:__________                    PCP:Sheldon Dumont W, MD                         Last EKG:    :_10/1950                                                                    AGE:74 y.o.      REASON FOR VISIT:____________________________________________________________________________        WT:____________    HT:____________   BP:____________  HR:____________       SMOKER: CURRENT/PPD:___________________ FORMER:______________ NEVER:______________          RECENT HOSPITALIZATIONS OR   ER VISITS:________________________________________________________        Lipid Panel          1/3/2025    10:00 2025    08:58   Lipid Panel   Total Cholesterol 161  205    Triglycerides 82  98    HDL Cholesterol 48  54    VLDL Cholesterol 16  18    LDL Cholesterol  97  133

## 2025-07-16 ENCOUNTER — TELEPHONE (OUTPATIENT)
Dept: INTERNAL MEDICINE | Facility: CLINIC | Age: 75
End: 2025-07-16
Payer: MEDICARE

## 2025-07-16 ENCOUNTER — OFFICE VISIT (OUTPATIENT)
Dept: CARDIOLOGY | Age: 75
End: 2025-07-16
Payer: MEDICARE

## 2025-07-16 VITALS
SYSTOLIC BLOOD PRESSURE: 142 MMHG | BODY MASS INDEX: 27.16 KG/M2 | DIASTOLIC BLOOD PRESSURE: 70 MMHG | WEIGHT: 147.6 LBS | HEART RATE: 74 BPM | HEIGHT: 62 IN | OXYGEN SATURATION: 96 %

## 2025-07-16 DIAGNOSIS — I10 PRIMARY HYPERTENSION: Primary | ICD-10-CM

## 2025-07-16 DIAGNOSIS — E78.00 PURE HYPERCHOLESTEROLEMIA: ICD-10-CM

## 2025-07-16 NOTE — TELEPHONE ENCOUNTER
I believe the results are still pending.  Call back in 1 week if she has not heard from us.  Typically we always call patients with results when we get them.

## 2025-07-16 NOTE — PROGRESS NOTES
[facility]      Patient Name: Sandi Acevedo  :1950  Age: 74 y.o.  Encounter Provider:  Cristopher Kirk Jr, MD      Chief Complaint:   Chief Complaint   Patient presents with    Primary hypertension         HPI  Sandi Acevedo is a 74 y.o. female with a longstanding history of hypertension on medications for the last 25 years presents for follow-up evaluation.      Last clinic visit note: Previously followed by Dr. Coughlin and saw her about 3 weeks ago.  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  She walked up all 6 flights to clinic today.  No chest pain or shortness of breath is out of proportion.  She denies orthopnea, PND or edema.  No palpitations, dizziness or syncope.  No first-degree relatives with coronary artery disease or sudden cardiac death.  She is a lifelong non-smoker who denies alcohol or illicit drug use.  Blood pressure is elevated today in clinic but she states it is always elevated at her clinic visits and is much better controlled at home.  She was diagnosed a new combination pill based on high blood pressures in clinic per patient report.  She states she did not start the medication and has remained only on amlodipine 7.5 mg daily.    She remains active without symptoms.  No chest pain with activity.  No orthopnea, PND or edema.  No palpitations, dizziness or syncope.  She did have a lipid profile in January that looked pretty well-controlled but her latest in July shows that her total cholesterol is just above 200 with LDL greater than 130.  She states that she was not taking her red yeast rice at that time.  Blood pressure is slightly elevated today in clinic but seems better on home blood pressure log.  No cardiac complaints at time of interview.    The following portions of the patient's history were reviewed and updated as appropriate: allergies, current medications, past family history, past medical history, past social  "history, past surgical history and problem list.      Review of Systems   Constitutional: Negative for chills and fever.   HENT:  Negative for hoarse voice and sore throat.    Eyes:  Negative for double vision and photophobia.   Cardiovascular:  Negative for chest pain, leg swelling, near-syncope, orthopnea, palpitations, paroxysmal nocturnal dyspnea and syncope.   Respiratory:  Negative for cough and wheezing.    Skin:  Negative for poor wound healing and rash.   Musculoskeletal:  Negative for arthritis and joint swelling.   Gastrointestinal:  Negative for bloating, abdominal pain, hematemesis and hematochezia.   Neurological:  Negative for dizziness and focal weakness.   Psychiatric/Behavioral:  Negative for depression and suicidal ideas.        OBJECTIVE:   Vital Signs  Vitals:    07/16/25 0904   BP: 142/70   Pulse: 74   SpO2: 96%     Estimated body mass index is 27 kg/m² as calculated from the following:    Height as of this encounter: 157.5 cm (62\").    Weight as of this encounter: 67 kg (147 lb 9.6 oz).    Vitals reviewed.   Constitutional:       Appearance: Healthy appearance. Not in distress.   Neck:      Vascular: No JVR. JVD normal.   Pulmonary:      Effort: Pulmonary effort is normal.      Breath sounds: Normal breath sounds. No wheezing. No rhonchi. No rales.   Chest:      Chest wall: Not tender to palpatation.   Cardiovascular:      PMI at left midclavicular line. Normal rate. Regular rhythm. Normal S1. Normal S2.       Murmurs: There is no murmur.      No gallop.  No click. No rub.   Pulses:     Intact distal pulses.   Edema:     Peripheral edema absent.   Abdominal:      General: Bowel sounds are normal.      Palpations: Abdomen is soft.      Tenderness: There is no abdominal tenderness.   Musculoskeletal: Normal range of motion.         General: No tenderness. Skin:     General: Skin is warm and dry.   Neurological:      General: No focal deficit present.      Mental Status: Alert and oriented to " person, place and time.         Procedures    Lipid Panel          1/3/2025    10:00 7/11/2025    08:58   Lipid Panel   Total Cholesterol 161  205    Triglycerides 82  98    HDL Cholesterol 48  54    VLDL Cholesterol 16  18    LDL Cholesterol  97  133         BUN   Date Value Ref Range Status   07/11/2025 15.0 8.0 - 23.0 mg/dL Final     Creatinine   Date Value Ref Range Status   07/11/2025 0.86 0.57 - 1.00 mg/dL Final     Potassium   Date Value Ref Range Status   07/11/2025 3.9 3.5 - 5.2 mmol/L Final     ALT (SGPT)   Date Value Ref Range Status   07/11/2025 15 1 - 33 U/L Final     AST (SGOT)   Date Value Ref Range Status   07/11/2025 29 1 - 32 U/L Final           ASSESSMENT:     74-year-old female with hypertension presents for initial evaluation      PLAN OF CARE:     Essential hypertension -element of whitecoat hypertension.  Continue to monitor home blood pressure log.  Sodium restricted diet.  Encounter for cardiovascular screening -we discussed the option of starting a statin due to elevated ASCVD risk score but she would prefer to hold off at this time and stay on current medications and monitor at next lipid surveillance.  Mixed hyperlipidemia -as above    Return to clinic 12             Discharge Medications            Accurate as of July 16, 2025  9:06 AM. If you have any questions, ask your nurse or doctor.                Changes to Medications        Instructions Start Date   amLODIPine 10 MG tablet  Commonly known as: NORVASC  What changed: when to take this   10 mg, Oral, Daily             Continue These Medications        Instructions Start Date   ASPIRIN 81 PO   1 tablet, Daily      B COMPLEX-C ER PO   1 tablet, 3 Times Weekly      calcium carbonate 600 MG tablet  Commonly known as: OS-ROCIO   600 mg, 2 Times Weekly      cetirizine 10 MG tablet  Commonly known as: zyrTEC   10 mg, Daily PRN      cholecalciferol 25 MCG (1000 UT) tablet  Commonly known as: VITAMIN D3   1,000 Units, Daily      coenzyme Q10  100 MG capsule   100 mg, Daily      latanoprost 0.005 % ophthalmic solution  Commonly known as: XALATAN   Administer 1 drop to both eyes Every Night. GLAUCOMA      Magnesium Oxide -Mg Supplement 400 (240 Mg) MG tablet   400 mg, Daily      PROBIOTIC DAILY PO   1 tablet, Daily      Red Yeast Rice 600 MG capsule   1,200 mg, Daily      valsartan 320 MG tablet  Commonly known as: Diovan   320 mg, Oral, Daily      Vitamin E 180 MG (400 UNIT) capsule capsule   400 Units, Daily      Zinc 50 MG capsule   50 mg, Daily               Thank you for allowing me to participate in the care of your patient,      Sincerely,   Cristopher Kirk MD  [facility]  07/16/25  09:06 EDT

## 2025-08-15 RX ORDER — AMLODIPINE BESYLATE 10 MG/1
10 TABLET ORAL DAILY
Qty: 90 TABLET | Refills: 3 | Status: SHIPPED | OUTPATIENT
Start: 2025-08-15

## (undated) DEVICE — COVER,MAYO STAND,STERILE: Brand: MEDLINE

## (undated) DEVICE — SOL NS 500ML

## (undated) DEVICE — INTENDED FOR TISSUE SEPARATION, AND OTHER PROCEDURES THAT REQUIRE A SHARP SURGICAL BLADE TO PUNCTURE OR CUT.: Brand: BARD-PARKER ® CARBON RIB-BACK BLADES

## (undated) DEVICE — LEGGINGS, PAIR, CLEAR, STERILE: Brand: MEDLINE

## (undated) DEVICE — ANTIBACTERIAL UNDYED BRAIDED (POLYGLACTIN 910), SYNTHETIC ABSORBABLE SUTURE: Brand: COATED VICRYL

## (undated) DEVICE — DRAPE,UNDERBUTTOCKS,PCH,STERILE: Brand: MEDLINE

## (undated) DEVICE — SKIN PREP TRAY W/CHG: Brand: MEDLINE INDUSTRIES, INC.

## (undated) DEVICE — SPONGE,LAP,18"X18",DLX,XR,ST,5/PK,40/PK: Brand: MEDLINE

## (undated) DEVICE — SYR CONTRL LUERLOK 10CC

## (undated) DEVICE — DECANTER BAG 9": Brand: MEDLINE INDUSTRIES, INC.

## (undated) DEVICE — DRAPE,REIN 53X77,STERILE: Brand: MEDLINE

## (undated) DEVICE — PK HYST VAG 40

## (undated) DEVICE — NEEDLE, QUINCKE, 18GX3.5": Brand: MEDLINE

## (undated) DEVICE — SUT VIC 1 CT1 36IN J947H

## (undated) DEVICE — SYRINGE, LUER LOCK, 60ML: Brand: MEDLINE

## (undated) DEVICE — ENSEAL 20 CM SHAFT, LARGE JAW: Brand: ENSEAL X1

## (undated) DEVICE — SET,IRRIGATION,CYSTO/TUR,90": Brand: MEDLINE

## (undated) DEVICE — SUT VIC 0 TIES 18IN J912G

## (undated) DEVICE — GLV SURG PREMIERPRO ORTHO LTX PF SZ8 BRN

## (undated) DEVICE — IRRIGATOR BULB ASEPTO 60CC STRL